# Patient Record
Sex: FEMALE | Race: BLACK OR AFRICAN AMERICAN | NOT HISPANIC OR LATINO | Employment: FULL TIME | URBAN - METROPOLITAN AREA
[De-identification: names, ages, dates, MRNs, and addresses within clinical notes are randomized per-mention and may not be internally consistent; named-entity substitution may affect disease eponyms.]

---

## 2017-02-06 RX ORDER — ZOLPIDEM TARTRATE 10 MG/1
TABLET ORAL
Qty: 90 TABLET | Refills: 0 | Status: SHIPPED | OUTPATIENT
Start: 2017-02-06 | End: 2017-07-10 | Stop reason: SDUPTHER

## 2017-03-03 ENCOUNTER — TELEPHONE (OUTPATIENT)
Dept: INTERNAL MEDICINE | Facility: CLINIC | Age: 57
End: 2017-03-03

## 2017-03-03 NOTE — TELEPHONE ENCOUNTER
----- Message from Aleta Parra sent at 3/3/2017  8:22 AM EST -----  THE PATIENT WOULD LIKE TO GET A TB SCAN TEST WHEN SHE COMES TO HER APPOINTMENT NEXT WEEK. IF THERE IS ANY QUESTIONS THE PT'S CALL BACK NUMBER [phone]

## 2017-03-13 ENCOUNTER — OFFICE VISIT (OUTPATIENT)
Dept: INTERNAL MEDICINE | Facility: CLINIC | Age: 57
End: 2017-03-13

## 2017-03-13 VITALS
HEART RATE: 66 BPM | WEIGHT: 198 LBS | BODY MASS INDEX: 35.08 KG/M2 | DIASTOLIC BLOOD PRESSURE: 80 MMHG | OXYGEN SATURATION: 98 % | HEIGHT: 63 IN | SYSTOLIC BLOOD PRESSURE: 130 MMHG

## 2017-03-13 DIAGNOSIS — R29.898: Primary | ICD-10-CM

## 2017-03-13 PROCEDURE — 99213 OFFICE O/P EST LOW 20 MIN: CPT | Performed by: NURSE PRACTITIONER

## 2017-03-13 NOTE — PROGRESS NOTES
Subjective  Numbness (both ankles, ongoijg for a couple of months, legs feel cold to touch )      Diane aMrk is a 56 y.o. female.   No Known Allergies  History of Present Illness      Ankles appear darker than legs, ankles and feet cold but ankles feel slightly numb , has been about 6 mos , no changes , wearing high heels today but states this is not always the case  .    Review of Systems   Skin: Positive for color change.   Neurological: Positive for numbness.   All other systems reviewed and are negative.      Objective   Physical Exam   Constitutional: She is oriented to person, place, and time. She appears well-developed.   HENT:   Head: Normocephalic.   Eyes: Conjunctivae are normal.   Cardiovascular: Normal rate and regular rhythm.    Pulmonary/Chest: Effort normal and breath sounds normal.   Neurological: She is alert and oriented to person, place, and time. She has normal strength.   Skin: Skin is warm and dry.   I do not see a color change   Psychiatric: She has a normal mood and affect. Her behavior is normal. Judgment and thought content normal.       Assessment/Plan     Problem List Items Addressed This Visit     None      Visit Diagnoses     Ankle symptom    -  Primary    Relevant Orders    Duplex Venous Upper Extremity - Bilateral CAR        Will call pt with results and poc

## 2017-03-17 ENCOUNTER — TELEPHONE (OUTPATIENT)
Dept: INTERNAL MEDICINE | Facility: CLINIC | Age: 57
End: 2017-03-17

## 2017-03-17 NOTE — TELEPHONE ENCOUNTER
----- Message from Alize Holland sent at 3/17/2017  8:58 AM EDT -----  Contact: PATIENT   RE: UPCOMING DOPPLER    PATIENT STATES THAT THIS PROCEDURE IS GOING TO COST $1200 OUT OF POCKET. SHE STATES THIS IS MORE THAN SHE CAN AFFORD AND WOULD LIKE TO KNOW IF THERE IS SOMETHING ELSE THAT CAN BE DONE. SHE THINKS SHE HAD THIS DONE LAST YEAR AT [facility].    CALL BACK #[phone]

## 2017-03-17 NOTE — TELEPHONE ENCOUNTER
Spoke with pt and informed her that Taylor was out of the office and asked if this was something she could wait to discuss when Taylor was back or if this needed to be addressed before the weekend. Pt stated her appt is scheduled for Monday so she would like to hear back as soon as possible. I informed her I would speak with Dr. Escalante and contact her back with recommendations. Pt verbalized understanding and had no further questions.

## 2017-03-17 NOTE — TELEPHONE ENCOUNTER
I spoke with Dr. Escalante who stated that the reason her payment amount is so high is due to her insurance deductible. Dr. Escalante recommended she call her insurance and see if they recommended her having the procedure done at another facility and it be cheaper for her. I called and informed pt of this. Pt verbalized understanding and had no further questions.

## 2017-03-20 ENCOUNTER — APPOINTMENT (OUTPATIENT)
Dept: CARDIOLOGY | Facility: HOSPITAL | Age: 57
End: 2017-03-20

## 2017-03-30 RX ORDER — LOSARTAN POTASSIUM AND HYDROCHLOROTHIAZIDE 25; 100 MG/1; MG/1
TABLET ORAL
Qty: 30 TABLET | Refills: 5 | Status: SHIPPED | OUTPATIENT
Start: 2017-03-30 | End: 2018-01-10 | Stop reason: CLARIF

## 2017-04-18 ENCOUNTER — OFFICE VISIT (OUTPATIENT)
Dept: INTERNAL MEDICINE | Facility: CLINIC | Age: 57
End: 2017-04-18

## 2017-04-18 VITALS
HEART RATE: 64 BPM | WEIGHT: 198 LBS | SYSTOLIC BLOOD PRESSURE: 128 MMHG | DIASTOLIC BLOOD PRESSURE: 80 MMHG | BODY MASS INDEX: 35.08 KG/M2 | OXYGEN SATURATION: 99 % | HEIGHT: 63 IN

## 2017-04-18 DIAGNOSIS — I10 ESSENTIAL HYPERTENSION: Primary | ICD-10-CM

## 2017-04-18 PROCEDURE — 99213 OFFICE O/P EST LOW 20 MIN: CPT | Performed by: NURSE PRACTITIONER

## 2017-04-18 NOTE — PROGRESS NOTES
"Subjective  Follow-up (hypertension, needs TB skin test for work )      Diane Mark is a 56 y.o. female.   No Known Allergies  Hypertension   This is a chronic problem. The problem is controlled. Pertinent negatives include no chest pain, palpitations, peripheral edema, PND or shortness of breath. There are no associated agents to hypertension. Risk factors for coronary artery disease include post-menopausal state. Past treatments include angiotensin blockers. The current treatment provides significant improvement. There are no compliance problems.  There is no history of PVD, renovascular disease or retinopathy. There is no history of sleep apnea.         Needs tb skin test for work   The following portions of the patient's history were reviewed and updated as appropriate: allergies, current medications, past family history, past medical history, past social history, past surgical history and problem list.    Review of Systems   Constitutional: Negative for fatigue.   Respiratory: Negative for apnea, chest tightness and shortness of breath.    Cardiovascular: Negative for chest pain, palpitations, leg swelling and PND.   Skin: Negative for color change.   Psychiatric/Behavioral: The patient is not nervous/anxious.    All other systems reviewed and are negative.      Objective   Physical Exam   Constitutional: She is oriented to person, place, and time. She appears well-developed and well-nourished.   HENT:   Head: Normocephalic and atraumatic.   Eyes: Conjunctivae are normal.   Pulmonary/Chest: Effort normal.   Neurological: She is alert and oriented to person, place, and time.   Skin: Skin is warm and dry.   Psychiatric: She has a normal mood and affect. Her behavior is normal. Judgment and thought content normal.   Nursing note and vitals reviewed.    /80  Pulse 64  Ht 63\" (160 cm)  Wt 198 lb (89.8 kg)  SpO2 99%  BMI 35.07 kg/m2    Assessment/Plan     Problem List Items Addressed This Visit        " Cardiovascular and Mediastinum    Hypertension - Primary              Blood pressure is controlled with medication.

## 2017-04-21 ENCOUNTER — LAB (OUTPATIENT)
Dept: INTERNAL MEDICINE | Facility: CLINIC | Age: 57
End: 2017-04-21

## 2017-04-21 DIAGNOSIS — Z00.00 HEALTH CARE MAINTENANCE: Primary | ICD-10-CM

## 2017-04-21 LAB
INDURATION: 0 MM (ref 0–10)
TB SKIN TEST: NEGATIVE

## 2017-04-21 PROCEDURE — 86580 TB INTRADERMAL TEST: CPT | Performed by: NURSE PRACTITIONER

## 2017-04-27 ENCOUNTER — OFFICE VISIT (OUTPATIENT)
Dept: INTERNAL MEDICINE | Facility: CLINIC | Age: 57
End: 2017-04-27

## 2017-04-27 VITALS
BODY MASS INDEX: 35.33 KG/M2 | HEIGHT: 63 IN | OXYGEN SATURATION: 98 % | HEART RATE: 70 BPM | SYSTOLIC BLOOD PRESSURE: 134 MMHG | WEIGHT: 199.4 LBS | DIASTOLIC BLOOD PRESSURE: 82 MMHG

## 2017-04-27 DIAGNOSIS — N89.8 VAGINAL DISCHARGE: Primary | ICD-10-CM

## 2017-04-27 PROCEDURE — 87591 N.GONORRHOEAE DNA AMP PROB: CPT | Performed by: NURSE PRACTITIONER

## 2017-04-27 PROCEDURE — 99213 OFFICE O/P EST LOW 20 MIN: CPT | Performed by: NURSE PRACTITIONER

## 2017-04-27 PROCEDURE — 87491 CHLMYD TRACH DNA AMP PROBE: CPT | Performed by: NURSE PRACTITIONER

## 2017-04-27 PROCEDURE — 87798 DETECT AGENT NOS DNA AMP: CPT | Performed by: NURSE PRACTITIONER

## 2017-04-27 PROCEDURE — 87481 CANDIDA DNA AMP PROBE: CPT | Performed by: NURSE PRACTITIONER

## 2017-04-27 PROCEDURE — 87661 TRICHOMONAS VAGINALIS AMPLIF: CPT | Performed by: NURSE PRACTITIONER

## 2017-04-27 NOTE — PROGRESS NOTES
"Subjective  Vaginitis      Diane Mark is a 56 y.o. female.   No Known Allergies  History of Present Illness      Yesterday noted a discharge from vagina, had a bad smell to it , was frothy , a little itchy , has not used any otc meds   The following portions of the patient's history were reviewed and updated as appropriate: allergies, current medications, past family history, past medical history, past social history, past surgical history and problem list.    Review of Systems   Genitourinary: Positive for vaginal discharge.        Vaginal itching   All other systems reviewed and are negative.      Objective   Physical Exam   Constitutional: She is oriented to person, place, and time. She appears well-developed and well-nourished.   HENT:   Head: Normocephalic and atraumatic.   Pulmonary/Chest: Effort normal.   Genitourinary: Vaginal discharge found.   Genitourinary Comments: Thick white discharge   Neurological: She is alert and oriented to person, place, and time.   Skin: Skin is warm and dry.   Psychiatric: She has a normal mood and affect. Her behavior is normal. Judgment and thought content normal.   Nursing note and vitals reviewed.    /82  Pulse 70  Ht 63\" (160 cm)  Wt 199 lb 6.4 oz (90.4 kg)  SpO2 98%  BMI 35.32 kg/m2    Assessment/Plan     Problem List Items Addressed This Visit     None      Visit Diagnoses     Vaginal discharge    -  Primary    Relevant Orders    Luis FLOYD+          I will call her with results     "

## 2017-05-02 LAB
A VAGINAE DNA VAG QL NAA+PROBE: ABNORMAL SCORE
BVAB2 DNA VAG QL NAA+PROBE: ABNORMAL SCORE
C ALBICANS DNA VAG QL NAA+PROBE: NEGATIVE
C GLABRATA DNA VAG QL NAA+PROBE: POSITIVE
C TRACH RRNA SPEC DONR QL NAA+PROBE: NEGATIVE
MEGASPHAERA 1: ABNORMAL SCORE
N GONORRHOEA DNA SPEC QL NAA+PROBE: NEGATIVE
T VAGINALIS RRNA GENITAL QL PROBE: NEGATIVE

## 2017-05-02 RX ORDER — PHENTERMINE HYDROCHLORIDE 37.5 MG/1
CAPSULE ORAL
Qty: 30 CAPSULE | Refills: 0 | OUTPATIENT
Start: 2017-05-02

## 2017-05-03 RX ORDER — FLUCONAZOLE 150 MG/1
150 TABLET ORAL ONCE
Qty: 1 TABLET | Refills: 0 | Status: SHIPPED | OUTPATIENT
Start: 2017-05-03 | End: 2017-05-03

## 2017-05-05 ENCOUNTER — OFFICE VISIT (OUTPATIENT)
Dept: INTERNAL MEDICINE | Facility: CLINIC | Age: 57
End: 2017-05-05

## 2017-05-05 VITALS
DIASTOLIC BLOOD PRESSURE: 72 MMHG | WEIGHT: 199 LBS | HEART RATE: 67 BPM | OXYGEN SATURATION: 99 % | SYSTOLIC BLOOD PRESSURE: 124 MMHG | HEIGHT: 63 IN | BODY MASS INDEX: 35.26 KG/M2

## 2017-05-05 DIAGNOSIS — Z79.899 HIGH RISK MEDICATION USE: ICD-10-CM

## 2017-05-05 DIAGNOSIS — E66.01 MORBID OBESITY DUE TO EXCESS CALORIES (HCC): ICD-10-CM

## 2017-05-05 DIAGNOSIS — E66.09 NON MORBID OBESITY DUE TO EXCESS CALORIES: ICD-10-CM

## 2017-05-05 DIAGNOSIS — E66.3 OVERWEIGHT (BMI 25.0-29.9): Primary | ICD-10-CM

## 2017-05-05 PROCEDURE — 99213 OFFICE O/P EST LOW 20 MIN: CPT | Performed by: HOSPITALIST

## 2017-05-05 RX ORDER — PHENTERMINE HYDROCHLORIDE 37.5 MG/1
37.5 CAPSULE ORAL EVERY MORNING
Qty: 30 CAPSULE | Refills: 3 | Status: SHIPPED | OUTPATIENT
Start: 2017-05-05 | End: 2017-05-09 | Stop reason: SDUPTHER

## 2017-05-09 ENCOUNTER — TELEPHONE (OUTPATIENT)
Dept: INTERNAL MEDICINE | Facility: CLINIC | Age: 57
End: 2017-05-09

## 2017-05-09 DIAGNOSIS — E66.09 NON MORBID OBESITY DUE TO EXCESS CALORIES: ICD-10-CM

## 2017-05-09 RX ORDER — PHENTERMINE HYDROCHLORIDE 37.5 MG/1
37.5 CAPSULE ORAL EVERY MORNING
Qty: 30 CAPSULE | Refills: 3 | Status: SHIPPED | OUTPATIENT
Start: 2017-05-09 | End: 2017-07-28 | Stop reason: DRUGHIGH

## 2017-05-11 ENCOUNTER — OFFICE VISIT (OUTPATIENT)
Dept: INTERNAL MEDICINE | Facility: CLINIC | Age: 57
End: 2017-05-11

## 2017-05-11 VITALS
SYSTOLIC BLOOD PRESSURE: 134 MMHG | HEART RATE: 69 BPM | WEIGHT: 197 LBS | TEMPERATURE: 98.5 F | BODY MASS INDEX: 34.91 KG/M2 | OXYGEN SATURATION: 98 % | HEIGHT: 63 IN | DIASTOLIC BLOOD PRESSURE: 84 MMHG

## 2017-05-11 DIAGNOSIS — J20.9 ACUTE BRONCHITIS, UNSPECIFIED ORGANISM: Primary | ICD-10-CM

## 2017-05-11 PROCEDURE — 94640 AIRWAY INHALATION TREATMENT: CPT | Performed by: NURSE PRACTITIONER

## 2017-05-11 PROCEDURE — 99213 OFFICE O/P EST LOW 20 MIN: CPT | Performed by: NURSE PRACTITIONER

## 2017-05-11 PROCEDURE — 96372 THER/PROPH/DIAG INJ SC/IM: CPT | Performed by: NURSE PRACTITIONER

## 2017-05-11 RX ORDER — IPRATROPIUM BROMIDE AND ALBUTEROL SULFATE 2.5; .5 MG/3ML; MG/3ML
3 SOLUTION RESPIRATORY (INHALATION) ONCE
Status: COMPLETED | OUTPATIENT
Start: 2017-05-11 | End: 2017-05-11

## 2017-05-11 RX ORDER — BENZONATATE 100 MG/1
100 CAPSULE ORAL 3 TIMES DAILY PRN
Qty: 30 CAPSULE | Refills: 0 | Status: SHIPPED | OUTPATIENT
Start: 2017-05-11 | End: 2017-07-28

## 2017-05-11 RX ORDER — CEFDINIR 300 MG/1
300 CAPSULE ORAL 2 TIMES DAILY
Qty: 20 CAPSULE | Refills: 0 | Status: SHIPPED | OUTPATIENT
Start: 2017-05-11 | End: 2017-07-28

## 2017-05-11 RX ORDER — ALBUTEROL SULFATE 90 UG/1
2 AEROSOL, METERED RESPIRATORY (INHALATION) EVERY 4 HOURS PRN
Qty: 1 INHALER | Refills: 1 | Status: SHIPPED | OUTPATIENT
Start: 2017-05-11 | End: 2019-10-25 | Stop reason: SDUPTHER

## 2017-05-11 RX ORDER — METHYLPREDNISOLONE ACETATE 40 MG/ML
40 INJECTION, SUSPENSION INTRA-ARTICULAR; INTRALESIONAL; INTRAMUSCULAR; SOFT TISSUE ONCE
Status: COMPLETED | OUTPATIENT
Start: 2017-05-11 | End: 2017-05-11

## 2017-05-11 RX ADMIN — IPRATROPIUM BROMIDE AND ALBUTEROL SULFATE 3 ML: 2.5; .5 SOLUTION RESPIRATORY (INHALATION) at 14:43

## 2017-05-11 RX ADMIN — METHYLPREDNISOLONE ACETATE 40 MG: 40 INJECTION, SUSPENSION INTRA-ARTICULAR; INTRALESIONAL; INTRAMUSCULAR; SOFT TISSUE at 14:41

## 2017-05-12 DIAGNOSIS — R05.9 COUGH: Primary | ICD-10-CM

## 2017-07-10 RX ORDER — ZOLPIDEM TARTRATE 10 MG/1
TABLET ORAL
Qty: 90 TABLET | Refills: 0 | OUTPATIENT
Start: 2017-07-10

## 2017-07-11 RX ORDER — ZOLPIDEM TARTRATE 10 MG/1
TABLET ORAL
Qty: 30 TABLET | Refills: 0 | Status: SHIPPED | OUTPATIENT
Start: 2017-07-11 | End: 2017-08-10 | Stop reason: SDUPTHER

## 2017-07-11 NOTE — TELEPHONE ENCOUNTER
Called and informed pt of Rx being ready for . Informed pt that we can no longer call controlled medication into the pharmacy, that they must be picked up and signed for in the office. Pt verbalized understanding and had no further questions.

## 2017-07-19 DIAGNOSIS — E66.09 NON MORBID OBESITY DUE TO EXCESS CALORIES: ICD-10-CM

## 2017-07-19 RX ORDER — PHENTERMINE HYDROCHLORIDE 37.5 MG/1
37.5 CAPSULE ORAL EVERY MORNING
Qty: 30 CAPSULE | Refills: 3 | OUTPATIENT
Start: 2017-07-19

## 2017-07-28 ENCOUNTER — OFFICE VISIT (OUTPATIENT)
Dept: INTERNAL MEDICINE | Facility: CLINIC | Age: 57
End: 2017-07-28

## 2017-07-28 VITALS
BODY MASS INDEX: 35.07 KG/M2 | SYSTOLIC BLOOD PRESSURE: 122 MMHG | HEART RATE: 85 BPM | DIASTOLIC BLOOD PRESSURE: 70 MMHG | OXYGEN SATURATION: 100 % | WEIGHT: 198 LBS

## 2017-07-28 DIAGNOSIS — R20.0 NUMBNESS IN BOTH HANDS: ICD-10-CM

## 2017-07-28 DIAGNOSIS — G62.9 POLYNEUROPATHY: ICD-10-CM

## 2017-07-28 DIAGNOSIS — M79.605 BILATERAL LEG PAIN: Primary | ICD-10-CM

## 2017-07-28 DIAGNOSIS — R20.0 NUMBNESS IN BOTH LEGS: ICD-10-CM

## 2017-07-28 DIAGNOSIS — M79.604 BILATERAL LEG PAIN: Primary | ICD-10-CM

## 2017-07-28 DIAGNOSIS — E66.09 OBESITY DUE TO EXCESS CALORIES, UNSPECIFIED OBESITY SEVERITY: ICD-10-CM

## 2017-07-28 PROCEDURE — 99213 OFFICE O/P EST LOW 20 MIN: CPT | Performed by: NURSE PRACTITIONER

## 2017-07-28 RX ORDER — PHENTERMINE HYDROCHLORIDE 37.5 MG/1
37.5 CAPSULE ORAL EVERY MORNING
Qty: 30 CAPSULE | Refills: 0 | Status: SHIPPED | OUTPATIENT
Start: 2017-07-28 | End: 2018-01-10

## 2017-07-28 RX ORDER — GABAPENTIN 300 MG/1
300 CAPSULE ORAL 3 TIMES DAILY
Qty: 30 CAPSULE | Refills: 3 | Status: SHIPPED | OUTPATIENT
Start: 2017-07-28 | End: 2018-01-10

## 2017-07-28 NOTE — PROGRESS NOTES
Subjective  Numbness (and tingling in hands and legs )      Diane Mark is a 56 y.o. female.   No Known Allergies  History of Present Illness      Feet and legs, cold ache, numbness/tingle. Does well at night nut when sitting at desk has to constantly get up and walk around, intermittent x 6 mos , hands in last week just started having intermittent numbness  The following portions of the patient's history were reviewed and updated as appropriate: allergies, current medications, past family history, past medical history, past social history, past surgical history and problem list.    Review of Systems   Musculoskeletal: Positive for myalgias.   Neurological: Positive for numbness.   All other systems reviewed and are negative.      Objective   Physical Exam   Constitutional: She is oriented to person, place, and time. She appears well-developed and well-nourished.   HENT:   Head: Normocephalic and atraumatic.   Eyes: Conjunctivae are normal.   Cardiovascular: Normal rate.    Pulmonary/Chest: Effort normal.   Musculoskeletal: Normal range of motion. She exhibits no tenderness.   Neurological: She is alert and oriented to person, place, and time. She has normal reflexes.   Skin: Skin is warm and dry.   Psychiatric: She has a normal mood and affect. Her behavior is normal. Judgment and thought content normal.   Nursing note and vitals reviewed.      Assessment/Plan     Problem List Items Addressed This Visit     None      Visit Diagnoses     Bilateral leg pain    -  Primary    Relevant Orders    Nerve Conduction Test 3-4    Numbness in both hands        Relevant Orders    Nerve Conduction Test 3-4    Numbness in both legs        Relevant Orders    Duplex Venous Upper Extremity - Bilateral CAR    Nerve Conduction Test 3-4    Obesity due to excess calories, unspecified obesity severity        Relevant Medications    phentermine 37.5 MG capsule    Polyneuropathy        Relevant Medications    gabapentin (NEURONTIN)  300 MG capsule        Has appt in 8/17 fpr cpe, keep this appt

## 2017-08-01 ENCOUNTER — TELEPHONE (OUTPATIENT)
Dept: INTERNAL MEDICINE | Facility: CLINIC | Age: 57
End: 2017-08-01

## 2017-08-03 DIAGNOSIS — M79.605 PAIN IN BOTH LOWER EXTREMITIES: Primary | ICD-10-CM

## 2017-08-03 DIAGNOSIS — R20.0 BILATERAL LEG NUMBNESS: ICD-10-CM

## 2017-08-03 DIAGNOSIS — R20.0 BILATERAL HAND NUMBNESS: Primary | ICD-10-CM

## 2017-08-03 DIAGNOSIS — M79.604 PAIN IN BOTH LOWER EXTREMITIES: Primary | ICD-10-CM

## 2017-08-10 ENCOUNTER — HOSPITAL ENCOUNTER (OUTPATIENT)
Dept: CARDIOLOGY | Facility: HOSPITAL | Age: 57
Discharge: HOME | End: 2017-08-10
Admitting: NURSE PRACTITIONER

## 2017-08-10 ENCOUNTER — APPOINTMENT (OUTPATIENT)
Dept: NEUROLOGY | Facility: HOSPITAL | Age: 57
End: 2017-08-10

## 2017-08-10 DIAGNOSIS — R20.0 NUMBNESS IN BOTH LEGS: ICD-10-CM

## 2017-08-10 LAB
BH CV ECHO MEAS - BSA(HAYCOCK): 2 M^2
BH CV ECHO MEAS - BSA: 1.9 M^2
BH CV ECHO MEAS - BZI_BMI: 33.5 KILOGRAMS/M^2
BH CV ECHO MEAS - BZI_METRIC_HEIGHT: 160 CM
BH CV ECHO MEAS - BZI_METRIC_WEIGHT: 85.7 KG
BH CV LOWER VASCULAR LEFT COMMON FEMORAL AUGMENT: NORMAL
BH CV LOWER VASCULAR LEFT COMMON FEMORAL COMPRESS: NORMAL
BH CV LOWER VASCULAR LEFT COMMON FEMORAL PHASIC: NORMAL
BH CV LOWER VASCULAR LEFT COMMON FEMORAL SPONT: NORMAL
BH CV LOWER VASCULAR LEFT DISTAL FEMORAL COMPRESS: NORMAL
BH CV LOWER VASCULAR LEFT GASTRONEMIUS COMPRESS: NORMAL
BH CV LOWER VASCULAR LEFT GREATER SAPH AK COMPRESS: NORMAL
BH CV LOWER VASCULAR LEFT GREATER SAPH BK COMPRESS: NORMAL
BH CV LOWER VASCULAR LEFT LESSER SAPH COMPRESS: NORMAL
BH CV LOWER VASCULAR LEFT MID FEMORAL AUGMENT: NORMAL
BH CV LOWER VASCULAR LEFT MID FEMORAL COMPRESS: NORMAL
BH CV LOWER VASCULAR LEFT MID FEMORAL PHASIC: NORMAL
BH CV LOWER VASCULAR LEFT MID FEMORAL SPONT: NORMAL
BH CV LOWER VASCULAR LEFT PERONEAL COMPRESS: NORMAL
BH CV LOWER VASCULAR LEFT POPLITEAL AUGMENT: NORMAL
BH CV LOWER VASCULAR LEFT POPLITEAL COMPRESS: NORMAL
BH CV LOWER VASCULAR LEFT POPLITEAL PHASIC: NORMAL
BH CV LOWER VASCULAR LEFT POPLITEAL SPONT: NORMAL
BH CV LOWER VASCULAR LEFT POSTERIOR TIBIAL COMPRESS: NORMAL
BH CV LOWER VASCULAR LEFT PROFUNDA FEMORAL AUGMENT: NORMAL
BH CV LOWER VASCULAR LEFT PROFUNDA FEMORAL COMPRESS: NORMAL
BH CV LOWER VASCULAR LEFT PROFUNDA FEMORAL PHASIC: NORMAL
BH CV LOWER VASCULAR LEFT PROFUNDA FEMORAL SPONT: NORMAL
BH CV LOWER VASCULAR LEFT PROXIMAL FEMORAL COMPRESS: NORMAL
BH CV LOWER VASCULAR LEFT SAPHENOFEMORAL JUNCTION AUGMENT: NORMAL
BH CV LOWER VASCULAR LEFT SAPHENOFEMORAL JUNCTION COMPRESS: NORMAL
BH CV LOWER VASCULAR LEFT SAPHENOFEMORAL JUNCTION PHASIC: NORMAL
BH CV LOWER VASCULAR LEFT SAPHENOFEMORAL JUNCTION SPONT: NORMAL
BH CV LOWER VASCULAR RIGHT COMMON FEMORAL AUGMENT: NORMAL
BH CV LOWER VASCULAR RIGHT COMMON FEMORAL COMPRESS: NORMAL
BH CV LOWER VASCULAR RIGHT COMMON FEMORAL PHASIC: NORMAL
BH CV LOWER VASCULAR RIGHT COMMON FEMORAL SPONT: NORMAL
BH CV LOWER VASCULAR RIGHT DISTAL FEMORAL COMPRESS: NORMAL
BH CV LOWER VASCULAR RIGHT GASTRONEMIUS COMPRESS: NORMAL
BH CV LOWER VASCULAR RIGHT GREATER SAPH AK COMPRESS: NORMAL
BH CV LOWER VASCULAR RIGHT GREATER SAPH BK COMPRESS: NORMAL
BH CV LOWER VASCULAR RIGHT LESSER SAPH COMPRESS: NORMAL
BH CV LOWER VASCULAR RIGHT MID FEMORAL AUGMENT: NORMAL
BH CV LOWER VASCULAR RIGHT MID FEMORAL COMPRESS: NORMAL
BH CV LOWER VASCULAR RIGHT MID FEMORAL PHASIC: NORMAL
BH CV LOWER VASCULAR RIGHT MID FEMORAL SPONT: NORMAL
BH CV LOWER VASCULAR RIGHT PERONEAL COMPRESS: NORMAL
BH CV LOWER VASCULAR RIGHT POPLITEAL AUGMENT: NORMAL
BH CV LOWER VASCULAR RIGHT POPLITEAL COMPRESS: NORMAL
BH CV LOWER VASCULAR RIGHT POPLITEAL PHASIC: NORMAL
BH CV LOWER VASCULAR RIGHT POPLITEAL SPONT: NORMAL
BH CV LOWER VASCULAR RIGHT POSTERIOR TIBIAL COMPRESS: NORMAL
BH CV LOWER VASCULAR RIGHT PROFUNDA FEMORAL COMPRESS: NORMAL
BH CV LOWER VASCULAR RIGHT PROXIMAL FEMORAL COMPRESS: NORMAL
BH CV LOWER VASCULAR RIGHT SAPHENOFEMORAL JUNCTION AUGMENT: NORMAL
BH CV LOWER VASCULAR RIGHT SAPHENOFEMORAL JUNCTION COMPRESS: NORMAL
BH CV LOWER VASCULAR RIGHT SAPHENOFEMORAL JUNCTION PHASIC: NORMAL
BH CV LOWER VASCULAR RIGHT SAPHENOFEMORAL JUNCTION SPONT: NORMAL

## 2017-08-10 PROCEDURE — 93970 EXTREMITY STUDY: CPT | Performed by: INTERNAL MEDICINE

## 2017-08-10 PROCEDURE — 93970 EXTREMITY STUDY: CPT

## 2017-08-11 RX ORDER — ZOLPIDEM TARTRATE 10 MG/1
TABLET ORAL
Qty: 30 TABLET | Refills: 0 | Status: SHIPPED | OUTPATIENT
Start: 2017-08-11 | End: 2017-08-12 | Stop reason: SDUPTHER

## 2017-08-14 RX ORDER — ZOLPIDEM TARTRATE 10 MG/1
TABLET ORAL
Qty: 30 TABLET | Refills: 0 | Status: SHIPPED | OUTPATIENT
Start: 2017-08-14 | End: 2017-11-05 | Stop reason: SDUPTHER

## 2017-08-21 ENCOUNTER — OFFICE VISIT (OUTPATIENT)
Dept: INTERNAL MEDICINE | Facility: CLINIC | Age: 57
End: 2017-08-21

## 2017-08-21 VITALS
SYSTOLIC BLOOD PRESSURE: 128 MMHG | HEART RATE: 63 BPM | BODY MASS INDEX: 34.94 KG/M2 | DIASTOLIC BLOOD PRESSURE: 84 MMHG | WEIGHT: 197.2 LBS | OXYGEN SATURATION: 98 % | TEMPERATURE: 98.1 F | HEIGHT: 63 IN

## 2017-08-21 DIAGNOSIS — Z00.00 ROUTINE ADULT HEALTH MAINTENANCE: Primary | ICD-10-CM

## 2017-08-21 LAB
ALBUMIN SERPL-MCNC: 4.3 G/DL (ref 3.2–4.8)
ALBUMIN/GLOB SERPL: 1.5 G/DL (ref 1.5–2.5)
ALP SERPL-CCNC: 98 U/L (ref 25–100)
ALT SERPL W P-5'-P-CCNC: 32 U/L (ref 7–40)
ANION GAP SERPL CALCULATED.3IONS-SCNC: 8 MMOL/L (ref 3–11)
ARTICHOKE IGE QN: 106 MG/DL (ref 0–130)
AST SERPL-CCNC: 27 U/L (ref 0–33)
BILIRUB SERPL-MCNC: 0.4 MG/DL (ref 0.3–1.2)
BUN BLD-MCNC: 12 MG/DL (ref 9–23)
BUN/CREAT SERPL: 15 (ref 7–25)
CALCIUM SPEC-SCNC: 10 MG/DL (ref 8.7–10.4)
CHLORIDE SERPL-SCNC: 100 MMOL/L (ref 99–109)
CHOLEST SERPL-MCNC: 186 MG/DL (ref 0–200)
CO2 SERPL-SCNC: 32 MMOL/L (ref 20–31)
CREAT BLD-MCNC: 0.8 MG/DL (ref 0.6–1.3)
DEPRECATED RDW RBC AUTO: 40.9 FL (ref 37–54)
ERYTHROCYTE [DISTWIDTH] IN BLOOD BY AUTOMATED COUNT: 12 % (ref 11.3–14.5)
GFR SERPL CREATININE-BSD FRML MDRD: 89 ML/MIN/1.73
GLOBULIN UR ELPH-MCNC: 2.8 GM/DL
GLUCOSE BLD-MCNC: 86 MG/DL (ref 70–100)
HCT VFR BLD AUTO: 40.7 % (ref 34.5–44)
HDLC SERPL-MCNC: 49 MG/DL (ref 40–60)
HGB BLD-MCNC: 13.2 G/DL (ref 11.5–15.5)
MCH RBC QN AUTO: 30.1 PG (ref 27–31)
MCHC RBC AUTO-ENTMCNC: 32.4 G/DL (ref 32–36)
MCV RBC AUTO: 92.7 FL (ref 80–99)
PLATELET # BLD AUTO: 254 10*3/MM3 (ref 150–450)
PMV BLD AUTO: 11.1 FL (ref 6–12)
POTASSIUM BLD-SCNC: 3.8 MMOL/L (ref 3.5–5.5)
PROT SERPL-MCNC: 7.1 G/DL (ref 5.7–8.2)
RBC # BLD AUTO: 4.39 10*6/MM3 (ref 3.89–5.14)
SODIUM BLD-SCNC: 140 MMOL/L (ref 132–146)
TRIGL SERPL-MCNC: 143 MG/DL (ref 0–150)
TSH SERPL DL<=0.05 MIU/L-ACNC: 1.28 MIU/ML (ref 0.35–5.35)
WBC NRBC COR # BLD: 2.91 10*3/MM3 (ref 3.5–10.8)

## 2017-08-21 PROCEDURE — 80061 LIPID PANEL: CPT | Performed by: NURSE PRACTITIONER

## 2017-08-21 PROCEDURE — 80053 COMPREHEN METABOLIC PANEL: CPT | Performed by: NURSE PRACTITIONER

## 2017-08-21 PROCEDURE — 84443 ASSAY THYROID STIM HORMONE: CPT | Performed by: NURSE PRACTITIONER

## 2017-08-21 PROCEDURE — 85027 COMPLETE CBC AUTOMATED: CPT | Performed by: NURSE PRACTITIONER

## 2017-08-21 PROCEDURE — 99396 PREV VISIT EST AGE 40-64: CPT | Performed by: NURSE PRACTITIONER

## 2017-08-21 NOTE — PROGRESS NOTES
Subjective  Annual Exam (Annual physical; patient has no complaints)      Diane Mark is a 56 y.o. female.   No Known Allergies  History of Present Illness      Here for cpe and cbe today, no complaints  The following portions of the patient's history were reviewed and updated as appropriate: allergies, current medications, past family history, past medical history, past social history, past surgical history and problem list.    Review of Systems   Constitutional: Negative for appetite change, fever and unexpected weight change.   HENT: Negative for ear pain, facial swelling and sore throat.    Eyes: Negative for pain and visual disturbance.   Respiratory: Negative for chest tightness, shortness of breath and wheezing.    Cardiovascular: Negative for chest pain and palpitations.   Gastrointestinal: Negative for abdominal pain and blood in stool.   Endocrine: Negative.    Genitourinary: Negative for difficulty urinating and hematuria.   Musculoskeletal: Negative for joint swelling.   Neurological: Negative for tremors, seizures and syncope.   Hematological: Negative for adenopathy.   Psychiatric/Behavioral: Negative.    All other systems reviewed and are negative.      Objective   Physical Exam   Constitutional: She is oriented to person, place, and time. She appears well-developed and well-nourished. No distress.   HENT:   Head: Normocephalic and atraumatic. Hair is normal.   Right Ear: Hearing, tympanic membrane, external ear and ear canal normal. No drainage. No decreased hearing is noted.   Left Ear: Hearing, tympanic membrane, external ear and ear canal normal. No decreased hearing is noted.   Nose: No nasal deformity.   Mouth/Throat: Oropharynx is clear and moist.   Eyes: Conjunctivae, EOM and lids are normal. Pupils are equal, round, and reactive to light. Lids are everted and swept, no foreign bodies found. Right eye exhibits no discharge. Left eye exhibits no discharge.   Fundoscopic exam:       The  "right eye shows red reflex.        The left eye shows red reflex.   Neck: Normal range of motion. Neck supple. No JVD present. No tracheal deviation present. No thyromegaly present.   Cardiovascular: Normal rate, regular rhythm, normal heart sounds, intact distal pulses and normal pulses.  Exam reveals no gallop and no friction rub.    No murmur heard.  Pulmonary/Chest: Effort normal and breath sounds normal. No respiratory distress. She has no wheezes. She has no rales. Chest wall is not dull to percussion. She exhibits no mass, no tenderness and no bony tenderness. Right breast exhibits no inverted nipple, no mass and no nipple discharge. Left breast exhibits no inverted nipple, no mass and no nipple discharge.   Abdominal: Soft. Bowel sounds are normal. She exhibits no distension and no mass. There is no tenderness. There is no rebound and no guarding. No hernia.   Musculoskeletal: Normal range of motion. She exhibits no edema, tenderness or deformity.   Lymphadenopathy:     She has no cervical adenopathy.        Right: No inguinal adenopathy present.        Left: No inguinal adenopathy present.   Neurological: She is alert and oriented to person, place, and time. She has normal reflexes. She displays normal reflexes. No cranial nerve deficit. She exhibits normal muscle tone. Coordination normal.   Skin: Skin is warm and dry. No rash noted. She is not diaphoretic. No erythema.   Psychiatric: She has a normal mood and affect. Her behavior is normal. Judgment and thought content normal.   Nursing note and vitals reviewed.    /84  Pulse 63  Temp 98.1 °F (36.7 °C)  Ht 63\" (160 cm)  Wt 197 lb 3.2 oz (89.4 kg)  SpO2 98%  BMI 34.93 kg/m2  =  Assessment/Plan     Problem List Items Addressed This Visit     None      Visit Diagnoses     Routine adult health maintenance    -  Primary    Relevant Orders    CBC (No Diff)    Comprehensive Metabolic Panel    TSH    Lipid Panel    "     Seatbelts:-yes  Sunscreenyes  Smoke detectors;yes  Self skin exam;yes  Home secure;yes  Healthy diet;no  Exercise;yes  Smoker;no  Will review labs and send to pt with poc

## 2017-08-23 ENCOUNTER — TELEPHONE (OUTPATIENT)
Dept: INTERNAL MEDICINE | Facility: CLINIC | Age: 57
End: 2017-08-23

## 2017-08-23 NOTE — TELEPHONE ENCOUNTER
Called pt in regards to her medical examination forms that were filled out. Pt did not answer, left voicemail to call back.     Just needing to know if pt needs these faxed somewhere or if she is picking them up.

## 2017-08-28 ENCOUNTER — HOSPITAL ENCOUNTER (OUTPATIENT)
Dept: NEUROLOGY | Facility: HOSPITAL | Age: 57
Discharge: HOME | End: 2017-08-28
Admitting: NURSE PRACTITIONER

## 2017-08-28 ENCOUNTER — TELEPHONE (OUTPATIENT)
Dept: INTERNAL MEDICINE | Facility: CLINIC | Age: 57
End: 2017-08-28

## 2017-08-28 DIAGNOSIS — R20.0 BILATERAL HAND NUMBNESS: ICD-10-CM

## 2017-08-28 DIAGNOSIS — M79.604 BILATERAL LEG PAIN: ICD-10-CM

## 2017-08-28 DIAGNOSIS — M79.605 BILATERAL LEG PAIN: ICD-10-CM

## 2017-08-28 DIAGNOSIS — R20.0 NUMBNESS IN BOTH HANDS: ICD-10-CM

## 2017-08-28 DIAGNOSIS — R20.0 BILATERAL LEG NUMBNESS: ICD-10-CM

## 2017-08-28 DIAGNOSIS — R20.0 NUMBNESS IN BOTH LEGS: ICD-10-CM

## 2017-08-28 PROCEDURE — 95908 NRV CNDJ TST 3-4 STUDIES: CPT

## 2017-08-28 PROCEDURE — 95912 NRV CNDJ TEST 11-12 STUDIES: CPT

## 2017-08-28 NOTE — TELEPHONE ENCOUNTER
MS GOLDSTEIN CALLED TODAY TO FOLLOW UP ON THE STATUS OF A FORM SHE REQUESTED TO HAVE FILLED OUT FOR HER EMPLOYER AFTER HER RECENT PE.

## 2017-08-29 NOTE — TELEPHONE ENCOUNTER
It is scanned in her chart under the media tab. Patient history - Employee Med Exam [facility]  Action Ambler. dated 8/21/17. I was faxed on 8/24/17.     Patient notified.

## 2017-08-31 ENCOUNTER — TELEPHONE (OUTPATIENT)
Dept: INTERNAL MEDICINE | Facility: CLINIC | Age: 57
End: 2017-08-31

## 2017-09-05 DIAGNOSIS — M79.604 BILATERAL LEG PAIN: Primary | ICD-10-CM

## 2017-09-05 DIAGNOSIS — M79.605 BILATERAL LEG PAIN: Primary | ICD-10-CM

## 2017-09-13 ENCOUNTER — OFFICE VISIT (OUTPATIENT)
Dept: ORTHOPEDIC SURGERY | Facility: CLINIC | Age: 57
End: 2017-09-13

## 2017-09-13 VITALS
SYSTOLIC BLOOD PRESSURE: 160 MMHG | HEIGHT: 65 IN | BODY MASS INDEX: 32.15 KG/M2 | WEIGHT: 193 LBS | DIASTOLIC BLOOD PRESSURE: 80 MMHG | HEART RATE: 67 BPM

## 2017-09-13 DIAGNOSIS — M70.62 TROCHANTERIC BURSITIS OF BOTH HIPS: Primary | ICD-10-CM

## 2017-09-13 DIAGNOSIS — R52 PAIN: ICD-10-CM

## 2017-09-13 DIAGNOSIS — R09.89 POOR CIRCULATION OF EXTREMITY: ICD-10-CM

## 2017-09-13 DIAGNOSIS — M70.61 TROCHANTERIC BURSITIS OF BOTH HIPS: Primary | ICD-10-CM

## 2017-09-13 PROCEDURE — 99204 OFFICE O/P NEW MOD 45 MIN: CPT | Performed by: ORTHOPAEDIC SURGERY

## 2017-09-13 PROCEDURE — 20610 DRAIN/INJ JOINT/BURSA W/O US: CPT | Performed by: ORTHOPAEDIC SURGERY

## 2017-09-13 RX ORDER — LIDOCAINE HYDROCHLORIDE 10 MG/ML
4 INJECTION, SOLUTION INFILTRATION; PERINEURAL
Status: COMPLETED | OUTPATIENT
Start: 2017-09-13 | End: 2017-09-13

## 2017-09-13 RX ORDER — BUPIVACAINE HYDROCHLORIDE 2.5 MG/ML
4 INJECTION, SOLUTION INFILTRATION; PERINEURAL
Status: COMPLETED | OUTPATIENT
Start: 2017-09-13 | End: 2017-09-13

## 2017-09-13 RX ORDER — BETAMETHASONE SODIUM PHOSPHATE AND BETAMETHASONE ACETATE 3; 3 MG/ML; MG/ML
6 INJECTION, SUSPENSION INTRA-ARTICULAR; INTRALESIONAL; INTRAMUSCULAR; SOFT TISSUE
Status: COMPLETED | OUTPATIENT
Start: 2017-09-13 | End: 2017-09-13

## 2017-09-13 RX ADMIN — BETAMETHASONE SODIUM PHOSPHATE AND BETAMETHASONE ACETATE 6 MG: 3; 3 INJECTION, SUSPENSION INTRA-ARTICULAR; INTRALESIONAL; INTRAMUSCULAR; SOFT TISSUE at 09:46

## 2017-09-13 RX ADMIN — LIDOCAINE HYDROCHLORIDE 4 ML: 10 INJECTION, SOLUTION INFILTRATION; PERINEURAL at 09:46

## 2017-09-13 RX ADMIN — BUPIVACAINE HYDROCHLORIDE 4 ML: 2.5 INJECTION, SOLUTION INFILTRATION; PERINEURAL at 09:46

## 2017-09-13 NOTE — PROGRESS NOTES
Procedure   Large Joint Arthrocentesis  Date/Time: 9/13/2017 9:46 AM  Consent given by: patient  Site marked: site marked  Timeout: Immediately prior to procedure a time out was called to verify the correct patient, procedure, equipment, support staff and site/side marked as required   Supporting Documentation  Indications: pain   Procedure Details  Location: hip - L greater trochanteric bursa  Needle size: 22 G  Approach: lateral  Medications administered: 4 mL bupivacaine; 4 mL lidocaine 1 %; 6 mg betamethasone acetate-betamethasone sodium phosphate 6 (3-3) MG/ML  Patient tolerance: patient tolerated the procedure well with no immediate complications

## 2017-09-13 NOTE — PROGRESS NOTES
[facility] Note    Subjective     Chief Complaint   Patient presents with   • Hip Pain     Bilateral hip pain for 8 years, Dull, Sharp and achey pain        HPI      Diane Sood is a 56 y.o. female.  She has a history of bilateral hip pain left worse than right.  Worse with walking and sitting all day.  It is better standing at work and resting.  She complains of coolness and discoloration in her left leg she had a venous study and was told it was negative.  She tried tramadol and ibuprofen.  Her pain is 8-9 out of 10 at times.  His aching burning and stabbing and shooting.  She has tried anti-inflammatories and physical therapy she has not yet had an injection.        Past Medical History:   Diagnosis Date   • Arthritis    • Insomnia       Past Surgical History:   Procedure Laterality Date   • HYSTERECTOMY     • SHOULDER SURGERY      Right shoulder   • TUBAL ABDOMINAL LIGATION        Family History   Problem Relation Age of Onset   • Hyperlipidemia Mother    • Osteoarthritis Mother    • Hypertension Mother    • Arthritis Father    • Heart failure Father    • Diabetes Father    • Hypertension Father    • Kidney disease Father    • Stroke Father    • Obesity Other      Social History     Social History   • Marital status:      Spouse name: N/A   • Number of children: N/A   • Years of education: N/A     Occupational History   • Not on file.     Social History Main Topics   • Smoking status: Former Smoker   • Smokeless tobacco: Never Used   • Alcohol use No   • Drug use: No   • Sexual activity: Defer     Other Topics Concern   • Not on file     Social History Narrative      Current Outpatient Prescriptions on File Prior to Visit   Medication Sig Dispense Refill   • folic acid (FOLVITE) 1 MG tablet   4   • losartan-hydrochlorothiazide (HYZAAR) 100-25 MG per tablet TAKE 1 TABLET BY MOUTH DAILY 30 tablet 5   • methotrexate 2.5 MG tablet TAKE 6 TABLETS WEEKLY. (TAKE 3 TABLETS IN  "THE MORNING & 3 TABLETS 12 HRS LATER) REPEAT WEEKLY  4   • zolpidem (AMBIEN) 10 MG tablet TAKE 1 TABLET AT BEDTIME 30 tablet 0   • albuterol (PROAIR HFA) 108 (90 BASE) MCG/ACT inhaler Inhale 2 puffs Every 4 (Four) Hours As Needed for Wheezing. 1 inhaler 1   • gabapentin (NEURONTIN) 300 MG capsule Take 1 capsule by mouth 3 (Three) Times a Day. 30 capsule 3   • linaclotide (LINZESS) 145 MCG capsule capsule Take 1 capsule by mouth Every Morning Before Breakfast. 30 capsule 5   • phentermine 37.5 MG capsule Take 1 capsule by mouth Every Morning. 30 capsule 0   • polyethylene glycol (MIRALAX) packet Take 17 g by mouth Daily. 30 each 3     No current facility-administered medications on file prior to visit.       No Known Allergies     The following portions of the patient's history were reviewed and updated as appropriate: allergies, current medications, past family history, past medical history, past social history, past surgical history and problem list.    Review of Systems   Constitutional: Positive for activity change and fatigue.   HENT: Negative.    Eyes: Positive for pain.   Respiratory: Negative.    Cardiovascular: Positive for palpitations.   Gastrointestinal: Negative.    Endocrine: Negative.    Genitourinary: Negative.    Musculoskeletal: Positive for arthralgias, back pain and neck pain.   Skin: Negative.         sarcadosia   Allergic/Immunologic: Negative.    Neurological: Negative.    Hematological: Bruises/bleeds easily.   Psychiatric/Behavioral: Positive for sleep disturbance.        Objective      Physical Exam  /80  Pulse 67  Ht 65.35\" (166 cm)  Wt 193 lb (87.5 kg)  BMI 31.77 kg/m2    Body mass index is 31.77 kg/(m^2).    General  Mental Status - alert  General Appearance - cooperative, well groomed, not in acute distress  Orientation - Oriented X3  Build & Nutrition - well developed and well nourished  Posture - normal posture  Gait - Normal     Integumentary  Global Assessment  Examination " of related systems reveals - no lymphadenopathy  General Characteristics  Overall examination of the patient's skin reveals - no rashes, no evidence of scars, no suspicious lesions and no bruises.  Color - normal coloration of skin.    Ortho Exam  Peripheral Vascular  Lower Extremity   Edema - None bilaterally    Musculoskeletal  Lower Extremity   Left Hip    Normal range of motion    No crepitus, instability, subluxation or laxity    No known fractures or dislocations   Right Hip    Normal range of motion    No crepitus, instability, subluxation or laxity    No known fractures or dislocations     Inspection and palpation    Tenderness -      Right - positive over greater trochanter      Left - positive over greater trochanter     Swelling - none bilaterally    Tissue tension/texture is pliable and soft bilaterally     Normal warmth bilaterally   Strength and Tone    Left hip flexors - 5/5     Right hip flexors - 5/5   Deformities/Postures/Misalignments/Discrepancies    No leg length discrepancy   Functional Testing    Stinchfield test positive bilaterally    90-90 straight leg raise negative bilaterally          Imaging/Studies  Imaging Results (last 24 hours)     Procedure Component Value Units Date/Time    XR Pelvis 1 or 2 View [[id number]] Resulted:  09/13/17 0941     Updated:  09/13/17 0941    Narrative:       Pelvis X-Ray  Indication: Pain  AP and Frog    Findings:  No fracture  No bony lesion  Normal soft tissues  Normal joint spaces    No prior studies were available for comparison.              Assessment/Plan      Diane was seen today for hip pain.    Diagnoses and all orders for this visit:    Trochanteric bursitis of both hips    Pain  -     XR Pelvis 1 or 2 View    Poor circulation of extremity    Other orders  -     Left hip trochanteric bursa injection    I will do an injection of the left hip trochanteric bursa today.  Next week we will inject the right hip trochanteric bursa.  I will schedule  ankle-brachial index vascular studies to ascertain a possible vascular reason for the left leg coolness and discoloration.  She has a normal vascular exam today.  She had a normal venous Doppler.  I may need to refer her to vascular surgery.    Medical Decision Making  Management Options : over-the-counter medicine and prescription/IM medicine  Data/Risk: radiology tests and independent visualization of imaging, lab tests, or EMG/NCV    Jean-Paul Doan MD  09/13/17  9:54 AM    GENERAL APPEARANCE: awake, alert & oriented x 3, in no acute distress and well developed, well nourished  PSYCH: normal affect  LUNGS:  breathing nonlabored  EYES: sclera anicteric  CARDIOVASCULAR: palpable dorsalis pedis, palpable posterior tibial bilaterally. Capillary refill less than 2 seconds  EXTREMITIES: no clubbing, cyanosis  GAIT:  Normal

## 2017-09-15 DIAGNOSIS — M79.605 LEG PAIN, DIFFUSE, LEFT: Primary | ICD-10-CM

## 2017-09-18 ENCOUNTER — HOSPITAL ENCOUNTER (OUTPATIENT)
Dept: CARDIOLOGY | Facility: HOSPITAL | Age: 57
Discharge: HOME | End: 2017-09-18
Attending: ORTHOPAEDIC SURGERY | Admitting: ORTHOPAEDIC SURGERY

## 2017-09-18 DIAGNOSIS — M79.605 LEG PAIN, DIFFUSE, LEFT: ICD-10-CM

## 2017-09-18 LAB
BH CV LOWER ARTERIAL LEFT ABI RATIO: 1.22
BH CV LOWER ARTERIAL LEFT DORSALIS PEDIS SYS MAX: 162 MMHG
BH CV LOWER ARTERIAL LEFT POST EX ABI RATIO: 1.24
BH CV LOWER ARTERIAL LEFT POST TIBIAL SYS MAX: 175 MMHG
BH CV LOWER ARTERIAL RIGHT ABI RATIO: 1.21
BH CV LOWER ARTERIAL RIGHT DORSALIS PEDIS SYS MAX: 152 MMHG
BH CV LOWER ARTERIAL RIGHT POST EX ABI RATIO: 1.18
BH CV LOWER ARTERIAL RIGHT POST TIBIAL SYS MAX: 173 MMHG
UPPER ARTERIAL LEFT ARM BRACHIAL SYS MAX: 143 MMHG
UPPER ARTERIAL RIGHT ARM BRACHIAL SYS MAX: 135 MMHG

## 2017-09-18 PROCEDURE — 93923 UPR/LXTR ART STDY 3+ LVLS: CPT | Performed by: INTERNAL MEDICINE

## 2017-09-18 PROCEDURE — 93923 UPR/LXTR ART STDY 3+ LVLS: CPT

## 2017-09-20 ENCOUNTER — TELEPHONE (OUTPATIENT)
Dept: INTERNAL MEDICINE | Facility: CLINIC | Age: 57
End: 2017-09-20

## 2017-09-21 ENCOUNTER — TELEPHONE (OUTPATIENT)
Dept: ENDOCRINOLOGY | Facility: CLINIC | Age: 57
End: 2017-09-21

## 2017-09-21 NOTE — TELEPHONE ENCOUNTER
----- Message from Clover BOSTON MA sent at 9/19/2017 11:35 AM EDT -----  Called to inform pt no answer left vm to return my call.

## 2017-09-22 RX ORDER — OXYBUTYNIN CHLORIDE 5 MG/1
TABLET ORAL
Qty: 60 TABLET | Refills: 0 | OUTPATIENT
Start: 2017-09-22

## 2017-10-20 ENCOUNTER — OFFICE VISIT (OUTPATIENT)
Dept: INTERNAL MEDICINE | Facility: CLINIC | Age: 57
End: 2017-10-20

## 2017-10-20 VITALS
BODY MASS INDEX: 32.65 KG/M2 | WEIGHT: 196 LBS | SYSTOLIC BLOOD PRESSURE: 162 MMHG | HEART RATE: 62 BPM | OXYGEN SATURATION: 99 % | HEIGHT: 65 IN | DIASTOLIC BLOOD PRESSURE: 82 MMHG

## 2017-10-20 DIAGNOSIS — I10 ESSENTIAL HYPERTENSION: ICD-10-CM

## 2017-10-20 DIAGNOSIS — D72.819 LEUKOPENIA, UNSPECIFIED TYPE: ICD-10-CM

## 2017-10-20 DIAGNOSIS — R10.32 LEFT LOWER QUADRANT PAIN: Primary | ICD-10-CM

## 2017-10-20 LAB
BASOPHILS # BLD AUTO: 0.01 10*3/MM3 (ref 0–0.2)
BASOPHILS NFR BLD AUTO: 0.3 % (ref 0–1)
BILIRUB BLD-MCNC: NEGATIVE MG/DL
CLARITY, POC: CLEAR
COLOR UR: YELLOW
DEPRECATED RDW RBC AUTO: 47.7 FL (ref 37–54)
EOSINOPHIL # BLD AUTO: 0.13 10*3/MM3 (ref 0–0.3)
EOSINOPHIL NFR BLD AUTO: 3.6 % (ref 0–3)
ERYTHROCYTE [DISTWIDTH] IN BLOOD BY AUTOMATED COUNT: 13.9 % (ref 11.3–14.5)
GLUCOSE UR STRIP-MCNC: NEGATIVE MG/DL
HCT VFR BLD AUTO: 40.3 % (ref 34.5–44)
HGB BLD-MCNC: 13.1 G/DL (ref 11.5–15.5)
IMM GRANULOCYTES # BLD: 0.01 10*3/MM3 (ref 0–0.03)
IMM GRANULOCYTES NFR BLD: 0.3 % (ref 0–0.6)
KETONES UR QL: NEGATIVE
LEUKOCYTE EST, POC: NEGATIVE
LYMPHOCYTES # BLD AUTO: 1.37 10*3/MM3 (ref 0.6–4.8)
LYMPHOCYTES NFR BLD AUTO: 38.2 % (ref 24–44)
MCH RBC QN AUTO: 30.5 PG (ref 27–31)
MCHC RBC AUTO-ENTMCNC: 32.5 G/DL (ref 32–36)
MCV RBC AUTO: 93.7 FL (ref 80–99)
MONOCYTES # BLD AUTO: 0.69 10*3/MM3 (ref 0–1)
MONOCYTES NFR BLD AUTO: 19.2 % (ref 0–12)
NEUTROPHILS # BLD AUTO: 1.38 10*3/MM3 (ref 1.5–8.3)
NEUTROPHILS NFR BLD AUTO: 38.4 % (ref 41–71)
NITRITE UR-MCNC: NEGATIVE MG/ML
PH UR: 8 [PH] (ref 5–8)
PLATELET # BLD AUTO: 268 10*3/MM3 (ref 150–450)
PMV BLD AUTO: 10.6 FL (ref 6–12)
PROT UR STRIP-MCNC: NEGATIVE MG/DL
RBC # BLD AUTO: 4.3 10*6/MM3 (ref 3.89–5.14)
RBC # UR STRIP: NEGATIVE /UL
SP GR UR: 1.01 (ref 1–1.03)
UROBILINOGEN UR QL: NORMAL
WBC NRBC COR # BLD: 3.59 10*3/MM3 (ref 3.5–10.8)

## 2017-10-20 PROCEDURE — 87086 URINE CULTURE/COLONY COUNT: CPT | Performed by: NURSE PRACTITIONER

## 2017-10-20 PROCEDURE — 99214 OFFICE O/P EST MOD 30 MIN: CPT | Performed by: NURSE PRACTITIONER

## 2017-10-20 PROCEDURE — 85025 COMPLETE CBC W/AUTO DIFF WBC: CPT | Performed by: NURSE PRACTITIONER

## 2017-10-20 PROCEDURE — 81003 URINALYSIS AUTO W/O SCOPE: CPT | Performed by: NURSE PRACTITIONER

## 2017-10-20 RX ORDER — AMLODIPINE BESYLATE 10 MG/1
10 TABLET ORAL DAILY
Qty: 30 TABLET | Refills: 11 | Status: SHIPPED | OUTPATIENT
Start: 2017-10-20 | End: 2018-06-25 | Stop reason: SDUPTHER

## 2017-10-20 NOTE — PROGRESS NOTES
"Subjective  Urinary Retention (x 1 week)      Diane Sood is a 57 y.o. female.   No Known Allergies  History of Present Illness      Pressure in left side , does not feel she is emptying the bladder , is bears down she does feel she is emptying bladder, started 1 week ago, not worsening, taking AZO but not working     B/p has been elevated last few visits, started walking program 1 month ago , eating healthier ( or trying), no cp, no soa   The following portions of the patient's history were reviewed and updated as appropriate: allergies, past family history, past social history and problem list.    Review of Systems   Gastrointestinal: Positive for abdominal pain.   Genitourinary: Positive for difficulty urinating.   All other systems reviewed and are negative.      Objective   Physical Exam   Constitutional: She appears well-developed and well-nourished.   HENT:   Head: Normocephalic and atraumatic.   Eyes: Conjunctivae are normal.   Cardiovascular: Normal rate and regular rhythm.    Pulmonary/Chest: Effort normal and breath sounds normal.   Abdominal: Soft. Bowel sounds are normal. There is tenderness in the left lower quadrant.   Skin: Skin is warm and dry.   Psychiatric: She has a normal mood and affect. Her behavior is normal. Judgment and thought content normal.   Nursing note and vitals reviewed.    /82  Pulse 62  Ht 65.35\" (166 cm)  Wt 196 lb (88.9 kg)  SpO2 99%  BMI 32.27 kg/m2    Assessment/Plan     Encounter Diagnoses   Name Primary?   • Left lower quadrant pain Yes   • Essential hypertension    • Leukopenia, unspecified type      Will send urine for cx and call her on Monday, if neg will send for CT, we have added norvasc to get her b/p wnl          Results for orders placed or performed in visit on 10/20/17   POCT urinalysis dipstick, automated   Result Value Ref Range    Color Yellow Yellow, Straw, Dark Yellow, Jessie    Clarity, UA Clear Clear    Glucose, UA Negative Negative, " 1000 mg/dL (3+) mg/dL    Bilirubin Negative Negative    Ketones, UA Negative Negative    Specific Gravity  1.010 1.005 - 1.030    Blood, UA Negative Negative    pH, Urine 8.0 5.0 - 8.0    Protein, POC Negative Negative mg/dL    Urobilinogen, UA Normal Normal    Leukocytes Negative Negative    Nitrite, UA Negative Negative

## 2017-10-30 ENCOUNTER — TELEPHONE (OUTPATIENT)
Dept: INTERNAL MEDICINE | Facility: CLINIC | Age: 57
End: 2017-10-30

## 2017-10-31 DIAGNOSIS — N32.81 OAB (OVERACTIVE BLADDER): Primary | ICD-10-CM

## 2017-11-06 RX ORDER — ZOLPIDEM TARTRATE 10 MG/1
TABLET ORAL
Qty: 30 TABLET | Refills: 0 | Status: SHIPPED | OUTPATIENT
Start: 2017-11-06 | End: 2017-12-29 | Stop reason: SDUPTHER

## 2017-12-29 DIAGNOSIS — G47.00 INSOMNIA, UNSPECIFIED TYPE: Primary | ICD-10-CM

## 2018-01-02 RX ORDER — ZOLPIDEM TARTRATE 10 MG/1
TABLET ORAL
Qty: 30 TABLET | Refills: 0 | Status: CANCELLED | OUTPATIENT
Start: 2018-01-02

## 2018-01-02 RX ORDER — ZOLPIDEM TARTRATE 10 MG/1
TABLET ORAL
Qty: 30 TABLET | Refills: 0 | Status: SHIPPED | OUTPATIENT
Start: 2018-01-02 | End: 2018-01-03

## 2018-01-02 NOTE — TELEPHONE ENCOUNTER
LOV 10/20/17  MYLA UPDATED TODAY  CONSENT NEEDS TO BE SIGNED.   LAST FILLED 11/6/17.  PLEASE ADVISE.

## 2018-01-02 NOTE — TELEPHONE ENCOUNTER
In all Np nurse going back to July 2017, no documentation that being seen for insomnia. Needs office visit for documentaion

## 2018-01-04 RX ORDER — ZOLPIDEM TARTRATE 10 MG/1
10 TABLET ORAL NIGHTLY PRN
Qty: 30 TABLET | Refills: 0 | Status: SHIPPED | OUTPATIENT
Start: 2018-01-04 | End: 2018-02-05 | Stop reason: SDUPTHER

## 2018-01-10 ENCOUNTER — OFFICE VISIT (OUTPATIENT)
Dept: INTERNAL MEDICINE | Facility: CLINIC | Age: 58
End: 2018-01-10

## 2018-01-10 VITALS
RESPIRATION RATE: 12 BRPM | HEART RATE: 58 BPM | HEIGHT: 65 IN | WEIGHT: 187.4 LBS | OXYGEN SATURATION: 97 % | DIASTOLIC BLOOD PRESSURE: 80 MMHG | BODY MASS INDEX: 31.22 KG/M2 | SYSTOLIC BLOOD PRESSURE: 112 MMHG

## 2018-01-10 DIAGNOSIS — G47.00 INSOMNIA, UNSPECIFIED TYPE: Primary | ICD-10-CM

## 2018-01-10 PROCEDURE — 99212 OFFICE O/P EST SF 10 MIN: CPT | Performed by: NURSE PRACTITIONER

## 2018-01-10 RX ORDER — OXYBUTYNIN CHLORIDE 5 MG/1
TABLET, EXTENDED RELEASE ORAL
Refills: 4 | COMMUNITY
Start: 2017-12-25 | End: 2018-06-25

## 2018-01-10 NOTE — PROGRESS NOTES
"Subjective  Follow-up (Maintenance Check Up and Med Refill. )      Diane Denson is a 57 y.o. female.   No Known Allergies  History of Present Illness      Pt states she is doing well, states she was told needed to come in so she could be seen for ambien maintenance , her UDS, Justin and controlled substance contract is utd, she will call in 2/18 when refill is needed, no problems on this medication  The following portions of the patient's history were reviewed and updated as appropriate: allergies, past family history, past surgical history and problem list.    Review of Systems   Psychiatric/Behavioral: Positive for sleep disturbance.   All other systems reviewed and are negative.      Objective   Physical Exam   Constitutional: She is oriented to person, place, and time. She appears well-nourished.   Neurological: She is alert and oriented to person, place, and time.   Psychiatric: She has a normal mood and affect. Her behavior is normal. Judgment and thought content normal.   Nursing note and vitals reviewed.    /80  Pulse 58  Resp 12  Ht 166 cm (65.35\")  Wt 85 kg (187 lb 6.4 oz)  SpO2 97%  BMI 30.85 kg/m2    Assessment/Plan     Problem List Items Addressed This Visit        Other    Insomnia - Primary        Pt has UDS, controlled substance contract and justin utd , rtc as directed       "

## 2018-02-05 RX ORDER — ZOLPIDEM TARTRATE 10 MG/1
TABLET ORAL
Qty: 30 TABLET | Refills: 0 | Status: SHIPPED | OUTPATIENT
Start: 2018-02-05 | End: 2018-03-07 | Stop reason: SDUPTHER

## 2018-02-06 ENCOUNTER — TELEPHONE (OUTPATIENT)
Dept: INTERNAL MEDICINE | Facility: CLINIC | Age: 58
End: 2018-02-06

## 2018-02-06 NOTE — TELEPHONE ENCOUNTER
Spoke with patient informed patient Rx was ready to be picked up and would be up front Patient verbalized understanding

## 2018-03-08 ENCOUNTER — OFFICE VISIT (OUTPATIENT)
Dept: INTERNAL MEDICINE | Facility: CLINIC | Age: 58
End: 2018-03-08

## 2018-03-08 VITALS
RESPIRATION RATE: 18 BRPM | WEIGHT: 179.5 LBS | BODY MASS INDEX: 29.55 KG/M2 | DIASTOLIC BLOOD PRESSURE: 70 MMHG | SYSTOLIC BLOOD PRESSURE: 138 MMHG | HEART RATE: 68 BPM

## 2018-03-08 DIAGNOSIS — G89.29 CHRONIC PAIN OF BOTH SHOULDERS: ICD-10-CM

## 2018-03-08 DIAGNOSIS — M54.2 NECK PAIN: Primary | ICD-10-CM

## 2018-03-08 DIAGNOSIS — M25.512 CHRONIC PAIN OF BOTH SHOULDERS: ICD-10-CM

## 2018-03-08 DIAGNOSIS — M25.511 CHRONIC PAIN OF BOTH SHOULDERS: ICD-10-CM

## 2018-03-08 DIAGNOSIS — G44.59 OTHER COMPLICATED HEADACHE SYNDROME: ICD-10-CM

## 2018-03-08 PROCEDURE — 99214 OFFICE O/P EST MOD 30 MIN: CPT | Performed by: NURSE PRACTITIONER

## 2018-03-08 RX ORDER — CYCLOBENZAPRINE HCL 10 MG
10 TABLET ORAL 3 TIMES DAILY PRN
Qty: 30 TABLET | Refills: 0 | Status: SHIPPED | OUTPATIENT
Start: 2018-03-08 | End: 2018-06-25 | Stop reason: SDUPTHER

## 2018-03-08 RX ORDER — PREDNISONE 20 MG/1
TABLET ORAL
Qty: 19 TABLET | Refills: 0 | Status: SHIPPED | OUTPATIENT
Start: 2018-03-08 | End: 2018-06-25

## 2018-03-08 RX ORDER — ZOLPIDEM TARTRATE 10 MG/1
TABLET ORAL
Qty: 30 TABLET | Refills: 0 | Status: SHIPPED | OUTPATIENT
Start: 2018-03-08 | End: 2018-04-12 | Stop reason: SDUPTHER

## 2018-03-08 NOTE — PROGRESS NOTES
Subjective  Back Pain and Neck Pain      Diane Denson is a 57 y.o. female.   No Known Allergies  History of Present Illness      Neck and shoulders hurting , headaches , mid back pain, has had for year or longer , but has worsened, taking ibuprofen but they no longer working. Breasts are DD and she thinks hurting her   The following portions of the patient's history were reviewed and updated as appropriate: allergies, past family history, past surgical history and problem list.    Review of Systems   Musculoskeletal: Positive for arthralgias, back pain and neck pain.   Neurological: Positive for headaches.   All other systems reviewed and are negative.      Objective   Physical Exam   Constitutional: She is oriented to person, place, and time. She appears well-developed and well-nourished.   HENT:   Head: Normocephalic and atraumatic.   Cardiovascular: Normal rate.    Pulmonary/Chest: Effort normal.   Musculoskeletal:        Right shoulder: She exhibits tenderness, pain and spasm.        Left shoulder: She exhibits tenderness, pain and spasm.        Cervical back: She exhibits tenderness, edema and pain.        Thoracic back: She exhibits tenderness and pain.   Neurological: She is alert and oriented to person, place, and time.   Skin: Skin is warm and dry.   Psychiatric: She has a normal mood and affect. Her behavior is normal. Judgment and thought content normal.   Nursing note and vitals reviewed.    /70 (BP Location: Right arm, Patient Position: Sitting)  Pulse 68  Resp 18  Wt 81.4 kg (179 lb 8 oz)  BMI 29.55 kg/m2    Assessment/Plan     Problem List Items Addressed This Visit        Nervous and Auditory    Neck pain - Primary    Relevant Medications    predniSONE (DELTASONE) 20 MG tablet    cyclobenzaprine (FLEXERIL) 10 MG tablet      Other Visit Diagnoses     Chronic pain of both shoulders        Relevant Medications    predniSONE (DELTASONE) 20 MG tablet    cyclobenzaprine (FLEXERIL) 10  MG tablet    Other complicated headache syndrome        Relevant Medications    predniSONE (DELTASONE) 20 MG tablet    cyclobenzaprine (FLEXERIL) 10 MG tablet             order given for PT and meds as above, rtc as needed

## 2018-04-12 RX ORDER — ZOLPIDEM TARTRATE 10 MG/1
TABLET ORAL
Qty: 30 TABLET | Refills: 0 | Status: SHIPPED | OUTPATIENT
Start: 2018-04-12 | End: 2018-05-15 | Stop reason: SDUPTHER

## 2018-05-15 ENCOUNTER — TELEPHONE (OUTPATIENT)
Dept: INTERNAL MEDICINE | Facility: CLINIC | Age: 58
End: 2018-05-15

## 2018-05-15 RX ORDER — ZOLPIDEM TARTRATE 10 MG/1
10 TABLET ORAL NIGHTLY PRN
Qty: 30 TABLET | Refills: 0 | Status: SHIPPED | OUTPATIENT
Start: 2018-05-15 | End: 2018-06-25

## 2018-05-15 RX ORDER — ZOLPIDEM TARTRATE 10 MG/1
TABLET ORAL
Qty: 30 TABLET | Refills: 0 | Status: CANCELLED | OUTPATIENT
Start: 2018-05-15

## 2018-05-16 RX ORDER — ZOLPIDEM TARTRATE 10 MG/1
TABLET ORAL
Qty: 30 TABLET | Refills: 0 | OUTPATIENT
Start: 2018-05-16

## 2018-06-22 RX ORDER — ZOLPIDEM TARTRATE 10 MG/1
TABLET ORAL
Qty: 30 TABLET | Refills: 0 | OUTPATIENT
Start: 2018-06-22

## 2018-06-25 ENCOUNTER — OFFICE VISIT (OUTPATIENT)
Dept: INTERNAL MEDICINE | Facility: CLINIC | Age: 58
End: 2018-06-25

## 2018-06-25 VITALS
HEART RATE: 63 BPM | OXYGEN SATURATION: 98 % | WEIGHT: 175 LBS | BODY MASS INDEX: 28.81 KG/M2 | DIASTOLIC BLOOD PRESSURE: 76 MMHG | SYSTOLIC BLOOD PRESSURE: 128 MMHG

## 2018-06-25 DIAGNOSIS — M54.42 CHRONIC BILATERAL LOW BACK PAIN WITH BILATERAL SCIATICA: ICD-10-CM

## 2018-06-25 DIAGNOSIS — M54.41 CHRONIC BILATERAL LOW BACK PAIN WITH BILATERAL SCIATICA: ICD-10-CM

## 2018-06-25 DIAGNOSIS — M54.2 NECK PAIN: ICD-10-CM

## 2018-06-25 DIAGNOSIS — G89.29 CHRONIC BILATERAL LOW BACK PAIN WITH BILATERAL SCIATICA: ICD-10-CM

## 2018-06-25 DIAGNOSIS — G47.00 INSOMNIA, UNSPECIFIED TYPE: ICD-10-CM

## 2018-06-25 DIAGNOSIS — M25.511 CHRONIC PAIN OF BOTH SHOULDERS: ICD-10-CM

## 2018-06-25 DIAGNOSIS — G89.29 CHRONIC PAIN OF BOTH SHOULDERS: ICD-10-CM

## 2018-06-25 DIAGNOSIS — G62.9 NEUROPATHY: Primary | ICD-10-CM

## 2018-06-25 DIAGNOSIS — M25.512 CHRONIC PAIN OF BOTH SHOULDERS: ICD-10-CM

## 2018-06-25 DIAGNOSIS — I10 ESSENTIAL HYPERTENSION: ICD-10-CM

## 2018-06-25 PROCEDURE — 99214 OFFICE O/P EST MOD 30 MIN: CPT | Performed by: PHYSICIAN ASSISTANT

## 2018-06-25 RX ORDER — CYCLOBENZAPRINE HCL 10 MG
10 TABLET ORAL 3 TIMES DAILY PRN
Qty: 30 TABLET | Refills: 0 | Status: SHIPPED | OUTPATIENT
Start: 2018-06-25 | End: 2019-10-25 | Stop reason: SDUPTHER

## 2018-06-25 RX ORDER — LOSARTAN POTASSIUM AND HYDROCHLOROTHIAZIDE 25; 100 MG/1; MG/1
1 TABLET ORAL DAILY
COMMUNITY
End: 2018-06-25

## 2018-06-25 RX ORDER — AMITRIPTYLINE HYDROCHLORIDE 25 MG/1
TABLET, FILM COATED ORAL
Qty: 30 TABLET | Refills: 3 | Status: SHIPPED | OUTPATIENT
Start: 2018-06-25 | End: 2018-07-12 | Stop reason: SDUPTHER

## 2018-06-25 RX ORDER — AMLODIPINE BESYLATE 10 MG/1
10 TABLET ORAL DAILY
Qty: 30 TABLET | Refills: 11 | Status: SHIPPED | OUTPATIENT
Start: 2018-06-25 | End: 2019-07-11 | Stop reason: SDUPTHER

## 2018-06-25 NOTE — PROGRESS NOTES
Chief Complaint   Patient presents with   • Med Refill       Subjective   Diane Denson is a 57 y.o. female.       History of Present Illness     Pt has been on ambien for insomnia for years, takes it prn and often takes 1/2 a tablet. Has been on ambien for a few years. Would like to try something that is not controlled.    She notes that she has chronic hip pain and numbness in her bilateral feet- has had extensive testing with [facility], venous duplex and exercise IV[facility]N. She also notes some lower back pain and neck pain. Has not tried PT for her back pain. Tried gabapentin for numbness, made her too sleepy.        Current Outpatient Prescriptions:   •  albuterol (PROAIR HFA) 108 (90 BASE) MCG/ACT inhaler, Inhale 2 puffs Every 4 (Four) Hours As Needed for Wheezing., Disp: 1 inhaler, Rfl: 1  •  amLODIPine (NORVASC) 10 MG tablet, Take 1 tablet by mouth Daily., Disp: 30 tablet, Rfl: 11  •  cyclobenzaprine (FLEXERIL) 10 MG tablet, Take 1 tablet by mouth 3 (Three) Times a Day As Needed for Muscle Spasms., Disp: 30 tablet, Rfl: 0  •  folic acid (FOLVITE) 1 MG tablet, , Disp: , Rfl: 4  •  methotrexate 2.5 MG tablet, TAKE 6 TABLETS WEEKLY. (TAKE 3 TABLETS IN THE MORNING & 3 TABLETS 12 HRS LATER) REPEAT WEEKLY, Disp: , Rfl: 4  •  amitriptyline (ELAVIL) 25 MG tablet, Take 1-2 tablets at night as needed., Disp: 30 tablet, Rfl: 3  •  linaclotide (LINZESS) 145 MCG capsule capsule, Take 1 capsule by mouth Every Morning Before Breakfast., Disp: 30 capsule, Rfl: 5  •  polyethylene glycol (MIRALAX) packet, Take 17 g by mouth Daily., Disp: 30 each, Rfl: 3     PMFSH  The following portions of the patient's history were reviewed and updated as appropriate: allergies, current medications, past family history, past medical history, past social history, past surgical history and problem list.    Review of Systems   Constitutional: Negative for appetite change, fever and unexpected weight change.   HENT: Negative.    Eyes: Negative  for pain and visual disturbance.   Respiratory: Negative for chest tightness, shortness of breath and wheezing.    Cardiovascular: Negative for chest pain and palpitations.   Gastrointestinal: Negative for abdominal pain, blood in stool, diarrhea, nausea and vomiting.   Endocrine: Negative.    Genitourinary: Negative for difficulty urinating, flank pain, frequency and urgency.   Musculoskeletal: Positive for back pain. Negative for joint swelling.   Skin: Negative for color change and rash.   Neurological: Positive for numbness. Negative for dizziness, tremors, weakness and headaches.   Hematological: Negative for adenopathy.   Psychiatric/Behavioral: Negative for confusion and decreased concentration.   All other systems reviewed and are negative.      Objective   /76   Pulse 63   Wt 79.4 kg (175 lb)   SpO2 98%   BMI 28.81 kg/m²     Physical Exam   Constitutional: She appears well-developed and well-nourished.   HENT:   Head: Normocephalic.   Right Ear: Hearing, tympanic membrane, external ear and ear canal normal.   Left Ear: Hearing, tympanic membrane, external ear and ear canal normal.   Nose: Nose normal.   Mouth/Throat: Oropharynx is clear and moist.   Eyes: Conjunctivae are normal. Pupils are equal, round, and reactive to light.   Neck: Normal range of motion.   Cardiovascular: Normal rate, regular rhythm and normal heart sounds.    Pulses:       Radial pulses are 2+ on the right side, and 2+ on the left side.        Dorsalis pedis pulses are 2+ on the right side, and 2+ on the left side.   Pulmonary/Chest: Effort normal and breath sounds normal. She has no decreased breath sounds. She has no wheezes. She has no rhonchi. She has no rales.   Musculoskeletal: Normal range of motion.   Neurological: She is alert.   Skin: Skin is warm and dry.   Psychiatric: She has a normal mood and affect. Her behavior is normal.   Nursing note and vitals reviewed.        ASSESSMENT/PLAN    Problem List Items  Addressed This Visit        Cardiovascular and Mediastinum    Hypertension     Hypertension is unchanged.  Continue current treatment regimen.  Dietary sodium restriction.  Weight loss.  Regular aerobic exercise.  Continue current medications.  Blood pressure will be reassessed at the next regular appointment.         Relevant Medications    amLODIPine (NORVASC) 10 MG tablet       Nervous and Auditory    Neck pain    Relevant Medications    cyclobenzaprine (FLEXERIL) 10 MG tablet    Other Relevant Orders    Ambulatory Referral to Physical Therapy Evaluate and treat    Neuropathy - Primary     Trial of amitriptyline qhs to help with insomnia and nerve pain.         Relevant Medications    amitriptyline (ELAVIL) 25 MG tablet       Other    Insomnia     Discussed non controlled options for insomnia including hydroxyzine, amitriptyline and trazodone. Decided to try amitriptyline to help with sleep and tingling in feet.           Other Visit Diagnoses     Chronic pain of both shoulders        Relevant Medications    cyclobenzaprine (FLEXERIL) 10 MG tablet    Other Relevant Orders    Ambulatory Referral to Physical Therapy Evaluate and treat    Chronic bilateral low back pain with bilateral sciatica        Relevant Orders    Ambulatory Referral to Physical Therapy Evaluate and treat               Return in about 3 months (around 9/25/2018) for Annual physical.

## 2018-06-26 NOTE — ASSESSMENT & PLAN NOTE
Discussed non controlled options for insomnia including hydroxyzine, amitriptyline and trazodone. Decided to try amitriptyline to help with sleep and tingling in feet.

## 2018-06-26 NOTE — ASSESSMENT & PLAN NOTE
Hypertension is unchanged.  Continue current treatment regimen.  Dietary sodium restriction.  Weight loss.  Regular aerobic exercise.  Continue current medications.  Blood pressure will be reassessed at the next regular appointment.

## 2018-07-10 ENCOUNTER — TELEPHONE (OUTPATIENT)
Dept: INTERNAL MEDICINE | Facility: CLINIC | Age: 58
End: 2018-07-10

## 2018-07-10 DIAGNOSIS — G62.9 NEUROPATHY: ICD-10-CM

## 2018-07-10 NOTE — TELEPHONE ENCOUNTER
PT CALLED TO LEAVE A MESSAGE FOR [facility] LUA OR LUPIS [facility]/ THE PT STATES SHE WAS PRESCRIBED MEDICATION TO HELP THE PT SLEEP AT NIGH TIME BUT WAS UNSURE OF THE NAME. THE PT STATES THE MEDICINE IS TOO STRONG AND THE PT CAN'T TAKE IT DUE TO THE SEVERITY IN THE DOSAGE. PT ALSO WANTED TO SEE ABOUT GETTING BACK ON THE AMBIEN MEDICATION SHE WAS ON PRIOR. BEST CALL BACK # [phone]

## 2018-07-12 RX ORDER — AMITRIPTYLINE HYDROCHLORIDE 10 MG/1
TABLET, FILM COATED ORAL
Qty: 30 TABLET | Refills: 3 | Status: SHIPPED | OUTPATIENT
Start: 2018-07-12 | End: 2018-10-16 | Stop reason: SDUPTHER

## 2018-07-12 NOTE — TELEPHONE ENCOUNTER
Called and asked pt, per pt the tablet is too small and she cannot cut it in half without it crumbling.

## 2018-09-17 ENCOUNTER — TELEPHONE (OUTPATIENT)
Dept: INTERNAL MEDICINE | Facility: CLINIC | Age: 58
End: 2018-09-17

## 2018-09-17 DIAGNOSIS — Z12.39 BREAST CANCER SCREENING: Primary | ICD-10-CM

## 2018-09-17 NOTE — TELEPHONE ENCOUNTER
PT WOULD LIKE TO KNOW IF IT WERE POSSIBLE TO HAVE [facility] REFERRAL HER FOR A MAMMOGRAM SO THAT SHE CAN HAVE IT DONE THE SAME DAY AS HER PHYSICAL? THE PT WAS TOMMY KING'S PT BEFORE SHE LEFT. PT WOULD LIKE TO GET A CALL BACK IN REGARDS TO HER MAMMOGRAM REFERRAL [phone]

## 2018-10-02 ENCOUNTER — TRANSCRIBE ORDERS (OUTPATIENT)
Dept: ADMINISTRATIVE | Facility: HOSPITAL | Age: 58
End: 2018-10-02

## 2018-10-02 DIAGNOSIS — Z12.31 VISIT FOR SCREENING MAMMOGRAM: Primary | ICD-10-CM

## 2018-10-16 ENCOUNTER — OFFICE VISIT (OUTPATIENT)
Dept: INTERNAL MEDICINE | Facility: CLINIC | Age: 58
End: 2018-10-16

## 2018-10-16 VITALS
HEART RATE: 86 BPM | OXYGEN SATURATION: 94 % | HEIGHT: 63 IN | WEIGHT: 182 LBS | DIASTOLIC BLOOD PRESSURE: 78 MMHG | BODY MASS INDEX: 32.25 KG/M2 | SYSTOLIC BLOOD PRESSURE: 118 MMHG

## 2018-10-16 DIAGNOSIS — G47.00 INSOMNIA, UNSPECIFIED TYPE: ICD-10-CM

## 2018-10-16 DIAGNOSIS — G62.9 NEUROPATHY: ICD-10-CM

## 2018-10-16 DIAGNOSIS — Z78.0 POST-MENOPAUSAL: ICD-10-CM

## 2018-10-16 DIAGNOSIS — Z00.00 HEALTH CARE MAINTENANCE: Primary | ICD-10-CM

## 2018-10-16 DIAGNOSIS — Z12.11 COLON CANCER SCREENING: ICD-10-CM

## 2018-10-16 LAB
25(OH)D3 SERPL-MCNC: 27 NG/ML
ALBUMIN SERPL-MCNC: 4.31 G/DL (ref 3.2–4.8)
ALBUMIN/GLOB SERPL: 1.8 G/DL (ref 1.5–2.5)
ALP SERPL-CCNC: 135 U/L (ref 25–100)
ALT SERPL W P-5'-P-CCNC: 23 U/L (ref 7–40)
ANION GAP SERPL CALCULATED.3IONS-SCNC: 4 MMOL/L (ref 3–11)
ARTICHOKE IGE QN: 128 MG/DL (ref 0–130)
AST SERPL-CCNC: 27 U/L (ref 0–33)
BASOPHILS # BLD AUTO: 0.01 10*3/MM3 (ref 0–0.2)
BASOPHILS NFR BLD AUTO: 0.3 % (ref 0–1)
BILIRUB SERPL-MCNC: 0.3 MG/DL (ref 0.3–1.2)
BUN BLD-MCNC: 12 MG/DL (ref 9–23)
BUN/CREAT SERPL: 13 (ref 7–25)
CALCIUM SPEC-SCNC: 9.3 MG/DL (ref 8.7–10.4)
CHLORIDE SERPL-SCNC: 103 MMOL/L (ref 99–109)
CHOLEST SERPL-MCNC: 196 MG/DL (ref 0–200)
CO2 SERPL-SCNC: 30 MMOL/L (ref 20–31)
CREAT BLD-MCNC: 0.92 MG/DL (ref 0.6–1.3)
DEPRECATED RDW RBC AUTO: 42.1 FL (ref 37–54)
EOSINOPHIL # BLD AUTO: 0.09 10*3/MM3 (ref 0–0.3)
EOSINOPHIL NFR BLD AUTO: 2.5 % (ref 0–3)
ERYTHROCYTE [DISTWIDTH] IN BLOOD BY AUTOMATED COUNT: 12.2 % (ref 11.3–14.5)
GFR SERPL CREATININE-BSD FRML MDRD: 75 ML/MIN/1.73
GLOBULIN UR ELPH-MCNC: 2.4 GM/DL
GLUCOSE BLD-MCNC: 77 MG/DL (ref 70–100)
HCT VFR BLD AUTO: 44 % (ref 34.5–44)
HDLC SERPL-MCNC: 57 MG/DL (ref 40–60)
HGB BLD-MCNC: 13.9 G/DL (ref 11.5–15.5)
IMM GRANULOCYTES # BLD: 0 10*3/MM3 (ref 0–0.03)
IMM GRANULOCYTES NFR BLD: 0 % (ref 0–0.6)
LYMPHOCYTES # BLD AUTO: 1.39 10*3/MM3 (ref 0.6–4.8)
LYMPHOCYTES NFR BLD AUTO: 38 % (ref 24–44)
MCH RBC QN AUTO: 29.6 PG (ref 27–31)
MCHC RBC AUTO-ENTMCNC: 31.6 G/DL (ref 32–36)
MCV RBC AUTO: 93.6 FL (ref 80–99)
MONOCYTES # BLD AUTO: 0.48 10*3/MM3 (ref 0–1)
MONOCYTES NFR BLD AUTO: 13.1 % (ref 0–12)
NEUTROPHILS # BLD AUTO: 1.69 10*3/MM3 (ref 1.5–8.3)
NEUTROPHILS NFR BLD AUTO: 46.1 % (ref 41–71)
PLATELET # BLD AUTO: 260 10*3/MM3 (ref 150–450)
PMV BLD AUTO: 10.4 FL (ref 6–12)
POTASSIUM BLD-SCNC: 4.2 MMOL/L (ref 3.5–5.5)
PROT SERPL-MCNC: 6.7 G/DL (ref 5.7–8.2)
RBC # BLD AUTO: 4.7 10*6/MM3 (ref 3.89–5.14)
SODIUM BLD-SCNC: 137 MMOL/L (ref 132–146)
TRIGL SERPL-MCNC: 85 MG/DL (ref 0–150)
TSH SERPL DL<=0.05 MIU/L-ACNC: 2.32 MIU/ML (ref 0.35–5.35)
WBC NRBC COR # BLD: 3.66 10*3/MM3 (ref 3.5–10.8)

## 2018-10-16 PROCEDURE — 99396 PREV VISIT EST AGE 40-64: CPT | Performed by: PHYSICIAN ASSISTANT

## 2018-10-16 PROCEDURE — 85025 COMPLETE CBC W/AUTO DIFF WBC: CPT | Performed by: PHYSICIAN ASSISTANT

## 2018-10-16 PROCEDURE — 84443 ASSAY THYROID STIM HORMONE: CPT | Performed by: PHYSICIAN ASSISTANT

## 2018-10-16 PROCEDURE — 80061 LIPID PANEL: CPT | Performed by: PHYSICIAN ASSISTANT

## 2018-10-16 PROCEDURE — 80053 COMPREHEN METABOLIC PANEL: CPT | Performed by: PHYSICIAN ASSISTANT

## 2018-10-16 PROCEDURE — 82306 VITAMIN D 25 HYDROXY: CPT | Performed by: PHYSICIAN ASSISTANT

## 2018-10-16 RX ORDER — AMITRIPTYLINE HYDROCHLORIDE 25 MG/1
25 TABLET, FILM COATED ORAL NIGHTLY
Qty: 30 TABLET | Refills: 5 | Status: SHIPPED | OUTPATIENT
Start: 2018-10-16 | End: 2019-06-04 | Stop reason: SDUPTHER

## 2018-10-16 NOTE — PROGRESS NOTES
"Chief Complaint   Patient presents with   • Annual Exam       Subjective   Diane Denson is a 58 y.o. female.       History of Present Illness     The patient is being seen for a health maintenance evaluation.  The last health maintenance was 1 year(s) ago.    Social History  Diane  does not smoke cigarettes.   She drinks no alcohol.  She does not use illicit drugs.    General History  Diane  does have regular dental visits.  She does complain of vision problems. Wears reading glasses. Last eye exam was 4 years ago.  Immunizations are not up to date. The patient needs the following immunizations: declines influenza vaccine; gets tetanus at [facility]; shingrix vaccine    Lifestyle  Diane  consumes in general, a \"healthy\" diet  .  She exercises intermittently.    Reproductive Health  Diane  is postmenopausal.  She reports no periods since total hysterectomy.  She is sexually active. Her contraceptive plan is no method.    Screening  Last pap was 7 years ago by Dr Escalante, no longer needed.  Last mammogram was  2017.  [facility]  Last DEXA was 2016.     Pt tried the amitriptyline, the 10 mg did not make her as sleepy as the 25 mg. It does help her neuropathy but does not help with her sleep.                 Current Outpatient Prescriptions:   •  albuterol (PROAIR HFA) 108 (90 BASE) MCG/ACT inhaler, Inhale 2 puffs Every 4 (Four) Hours As Needed for Wheezing., Disp: 1 inhaler, Rfl: 1  •  amitriptyline (ELAVIL) 25 MG tablet, Take 1 tablet by mouth Every Night. Take 1-2 tablets at night as needed., Disp: 30 tablet, Rfl: 5  •  amLODIPine (NORVASC) 10 MG tablet, Take 1 tablet by mouth Daily., Disp: 30 tablet, Rfl: 11  •  cyclobenzaprine (FLEXERIL) 10 MG tablet, Take 1 tablet by mouth 3 (Three) Times a Day As Needed for Muscle Spasms., Disp: 30 tablet, Rfl: 0  •  folic acid (FOLVITE) 1 MG tablet, , Disp: , Rfl: 4  •  linaclotide (LINZESS) 145 MCG capsule capsule, Take 1 " "capsule by mouth Every Morning Before Breakfast., Disp: 30 capsule, Rfl: 5  •  methotrexate 2.5 MG tablet, TAKE 6 TABLETS WEEKLY. (TAKE 3 TABLETS IN THE MORNING & 3 TABLETS 12 HRS LATER) REPEAT WEEKLY, Disp: , Rfl: 4  •  polyethylene glycol (MIRALAX) packet, Take 17 g by mouth Daily., Disp: 30 each, Rfl: 3     PMFSH  The following portions of the patient's history were reviewed and updated as appropriate: allergies, current medications, past family history, past medical history, past social history, past surgical history and problem list.    Review of Systems   Constitutional: Negative for appetite change, fever and unexpected weight change.   HENT: Negative for ear pain, facial swelling and sore throat.    Eyes: Negative for pain and visual disturbance.   Respiratory: Negative for chest tightness, shortness of breath and wheezing.    Cardiovascular: Negative for chest pain and palpitations.   Gastrointestinal: Negative for abdominal pain and blood in stool.   Endocrine: Negative.    Genitourinary: Negative for difficulty urinating and hematuria.   Musculoskeletal: Negative for joint swelling.   Neurological: Negative for tremors, seizures and syncope.   Hematological: Negative for adenopathy.   Psychiatric/Behavioral: Negative.        Objective   /78   Pulse 86   Ht 160.9 cm (63.35\")   Wt 82.6 kg (182 lb)   SpO2 94%   BMI 31.88 kg/m²     Physical Exam   Constitutional: She is oriented to person, place, and time. She appears well-developed and well-nourished. No distress.   HENT:   Head: Normocephalic and atraumatic. Hair is normal.   Right Ear: Hearing, tympanic membrane, external ear and ear canal normal. No drainage. No decreased hearing is noted.   Left Ear: Hearing, tympanic membrane, external ear and ear canal normal. No decreased hearing is noted.   Nose: No nasal deformity.   Mouth/Throat: Oropharynx is clear and moist.   Eyes: Pupils are equal, round, and reactive to light. Conjunctivae, EOM and " lids are normal. Lids are everted and swept, no foreign bodies found. Right eye exhibits no discharge. Left eye exhibits no discharge.   Fundoscopic exam:       The right eye shows red reflex.        The left eye shows red reflex.   Neck: Normal range of motion. Neck supple. No JVD present. No tracheal deviation present. No thyromegaly present.   Cardiovascular: Normal rate, regular rhythm, normal heart sounds, intact distal pulses and normal pulses.  Exam reveals no gallop and no friction rub.    No murmur heard.  Pulmonary/Chest: Effort normal and breath sounds normal. No respiratory distress. She has no wheezes. She has no rales. She exhibits no tenderness. Right breast exhibits no inverted nipple, no mass, no nipple discharge, no skin change and no tenderness. Left breast exhibits no inverted nipple, no mass, no nipple discharge, no skin change and no tenderness.   Abdominal: Soft. Bowel sounds are normal. She exhibits no distension and no mass. There is no tenderness. There is no rebound and no guarding. No hernia.   Musculoskeletal: Normal range of motion. She exhibits no edema, tenderness or deformity.   Lymphadenopathy:     She has no cervical adenopathy.        Right: No inguinal adenopathy present.        Left: No inguinal adenopathy present.   Neurological: She is alert and oriented to person, place, and time. She has normal reflexes. She displays normal reflexes. No cranial nerve deficit. She exhibits normal muscle tone. Coordination normal.   Skin: Skin is warm and dry. No rash noted. She is not diaphoretic. No erythema.   Psychiatric: She has a normal mood and affect. Her behavior is normal. Judgment and thought content normal.   Nursing note and vitals reviewed.      Results for orders placed or performed in visit on 10/16/18   Comprehensive Metabolic Panel   Result Value Ref Range    Glucose 77 70 - 100 mg/dL    BUN 12 9 - 23 mg/dL    Creatinine 0.92 0.60 - 1.30 mg/dL    Sodium 137 132 - 146 mmol/L     Potassium 4.2 3.5 - 5.5 mmol/L    Chloride 103 99 - 109 mmol/L    CO2 30.0 20.0 - 31.0 mmol/L    Calcium 9.3 8.7 - 10.4 mg/dL    Total Protein 6.7 5.7 - 8.2 g/dL    Albumin 4.31 3.20 - 4.80 g/dL    ALT (SGPT) 23 7 - 40 U/L    AST (SGOT) 27 0 - 33 U/L    Alkaline Phosphatase 135 (H) 25 - 100 U/L    Total Bilirubin 0.3 0.3 - 1.2 mg/dL    eGFR  African Amer 75 >60 mL/min/1.73    Globulin 2.4 gm/dL    A/G Ratio 1.8 1.5 - 2.5 g/dL    BUN/Creatinine Ratio 13.0 7.0 - 25.0    Anion Gap 4.0 3.0 - 11.0 mmol/L   Lipid Panel   Result Value Ref Range    Total Cholesterol 196 0 - 200 mg/dL    Triglycerides 85 0 - 150 mg/dL    HDL Cholesterol 57 40 - 60 mg/dL    LDL Cholesterol  128 0 - 130 mg/dL   TSH   Result Value Ref Range    TSH 2.316 0.350 - 5.350 mIU/mL   Vitamin D 25 Hydroxy   Result Value Ref Range    25 Hydroxy, Vitamin D 27.0 ng/ml   CBC Auto Differential   Result Value Ref Range    WBC 3.66 3.50 - 10.80 10*3/mm3    RBC 4.70 3.89 - 5.14 10*6/mm3    Hemoglobin 13.9 11.5 - 15.5 g/dL    Hematocrit 44.0 34.5 - 44.0 %    MCV 93.6 80.0 - 99.0 fL    MCH 29.6 27.0 - 31.0 pg    MCHC 31.6 (L) 32.0 - 36.0 g/dL    RDW 12.2 11.3 - 14.5 %    RDW-SD 42.1 37.0 - 54.0 fl    MPV 10.4 6.0 - 12.0 fL    Platelets 260 150 - 450 10*3/mm3    Neutrophil % 46.1 41.0 - 71.0 %    Lymphocyte % 38.0 24.0 - 44.0 %    Monocyte % 13.1 (H) 0.0 - 12.0 %    Eosinophil % 2.5 0.0 - 3.0 %    Basophil % 0.3 0.0 - 1.0 %    Immature Grans % 0.0 0.0 - 0.6 %    Neutrophils, Absolute 1.69 1.50 - 8.30 10*3/mm3    Lymphocytes, Absolute 1.39 0.60 - 4.80 10*3/mm3    Monocytes, Absolute 0.48 0.00 - 1.00 10*3/mm3    Eosinophils, Absolute 0.09 0.00 - 0.30 10*3/mm3    Basophils, Absolute 0.01 0.00 - 0.20 10*3/mm3    Immature Grans, Absolute 0.00 0.00 - 0.03 10*3/mm3        ASSESSMENT/PLAN    Problem List Items Addressed This Visit        Nervous and Auditory    Neuropathy    Relevant Medications    amitriptyline (ELAVIL) 25 MG tablet       Other    Insomnia      Increase amitriptyline to 25 mg qhs.         Health care maintenance - Primary     Immunizations: declines influenza vaccine; recommended shingrix vaccine; tetanus records requested  Eye exam: recommended  Pap Smear: no longer needed d/t hysterectomy  Mammogram: ordered  Dexa: ordered  Colonoscopy: records requested, pt feels she is overdue  Labs: fasting labs ordered             Relevant Orders    CBC & Differential (Completed)    Comprehensive Metabolic Panel (Completed)    Lipid Panel (Completed)    TSH (Completed)    Vitamin D 25 Hydroxy (Completed)    CBC Auto Differential (Completed)      Other Visit Diagnoses     Colon cancer screening        Relevant Orders    Ambulatory Referral For Screening Colonoscopy    Post-menopausal        Relevant Orders    DEXA Bone Density Axial               Return in about 1 year (around 10/16/2019) for Annual.

## 2018-10-17 PROBLEM — Z00.00 HEALTH CARE MAINTENANCE: Status: ACTIVE | Noted: 2018-10-17

## 2018-10-18 ENCOUNTER — TELEPHONE (OUTPATIENT)
Dept: INTERNAL MEDICINE | Facility: CLINIC | Age: 58
End: 2018-10-18

## 2018-10-18 NOTE — TELEPHONE ENCOUNTER
----- Message from JONE Sultana sent at 10/18/2018 11:43 AM EDT -----  Thank you! Please let her know that she has not had TDaP and will need to come in to get it or get it at pharmacy. Thanks    ----- Message -----  From: Clover Cruz MA  Sent: 10/17/2018   8:06 AM  To: JONE Sultana    Colonnoscopy report requested and will place in your folder once received it was done in 2012, per [facility], [facility] rep, pt only had a TB skin test done but no record of immunizations.    ----- Message -----  From: Cindy Rosa PA  Sent: 10/16/2018   3:28 PM  To: Clover BOSTON MA    Please request vaccination report from [facility] and colonoscopy report from [facility].

## 2018-10-18 NOTE — ASSESSMENT & PLAN NOTE
Immunizations: declines influenza vaccine; recommended shingrix vaccine; tetanus records requested  Eye exam: recommended  Pap Smear: no longer needed d/t hysterectomy  Mammogram: ordered  Dexa: ordered  Colonoscopy: records requested, pt feels she is overdue  Labs: fasting labs ordered

## 2018-11-17 DIAGNOSIS — G62.9 NEUROPATHY: ICD-10-CM

## 2018-11-17 RX ORDER — AMITRIPTYLINE HYDROCHLORIDE 10 MG/1
TABLET, FILM COATED ORAL
Qty: 30 TABLET | Refills: 3 | OUTPATIENT
Start: 2018-11-17

## 2019-01-22 ENCOUNTER — OFFICE VISIT (OUTPATIENT)
Dept: INTERNAL MEDICINE | Facility: CLINIC | Age: 59
End: 2019-01-22

## 2019-01-22 VITALS
WEIGHT: 185 LBS | SYSTOLIC BLOOD PRESSURE: 156 MMHG | DIASTOLIC BLOOD PRESSURE: 80 MMHG | OXYGEN SATURATION: 98 % | TEMPERATURE: 98.2 F | HEIGHT: 63 IN | HEART RATE: 94 BPM | BODY MASS INDEX: 32.78 KG/M2

## 2019-01-22 DIAGNOSIS — J20.9 ACUTE BRONCHITIS, UNSPECIFIED ORGANISM: Primary | ICD-10-CM

## 2019-01-22 PROCEDURE — 99213 OFFICE O/P EST LOW 20 MIN: CPT | Performed by: PHYSICIAN ASSISTANT

## 2019-01-22 RX ORDER — PREDNISONE 10 MG/1
TABLET ORAL
Qty: 30 TABLET | Refills: 0 | Status: SHIPPED | OUTPATIENT
Start: 2019-01-22 | End: 2019-10-25 | Stop reason: SDUPTHER

## 2019-01-22 NOTE — PROGRESS NOTES
Chief Complaint   Patient presents with   • Cough   • Nasal Congestion   • tightness in chest       Subjective   Diane Denson is a 58 y.o. female.       History of Present Illness     Pt has been sick for about 2 days with nasal congestion, drainage and cough. No fever. She is exposed to sick contacts at work. Gets sick almost every season. Taking some robitussin cough syrup to help loosen up her chest.      Current Outpatient Medications:   •  albuterol (PROAIR HFA) 108 (90 BASE) MCG/ACT inhaler, Inhale 2 puffs Every 4 (Four) Hours As Needed for Wheezing., Disp: 1 inhaler, Rfl: 1  •  amitriptyline (ELAVIL) 25 MG tablet, Take 1 tablet by mouth Every Night. Take 1-2 tablets at night as needed., Disp: 30 tablet, Rfl: 5  •  amLODIPine (NORVASC) 10 MG tablet, Take 1 tablet by mouth Daily., Disp: 30 tablet, Rfl: 11  •  cyclobenzaprine (FLEXERIL) 10 MG tablet, Take 1 tablet by mouth 3 (Three) Times a Day As Needed for Muscle Spasms., Disp: 30 tablet, Rfl: 0  •  polyethylene glycol (MIRALAX) packet, Take 17 g by mouth Daily., Disp: 30 each, Rfl: 3  •  predniSONE (DELTASONE) 10 MG tablet, Take 4 tablets for 3 days, then 3 tablets for 3 days, then 2 tablets for 3 days, then 1 tablet for 3 days, Disp: 30 tablet, Rfl: 0     PMFSH  The following portions of the patient's history were reviewed and updated as appropriate: allergies, current medications, past family history, past medical history, past social history, past surgical history and problem list.    Review of Systems   Constitutional: Positive for fatigue. Negative for activity change and unexpected weight change.   HENT: Positive for congestion, postnasal drip and sore throat. Negative for ear pain.    Eyes: Negative for pain and discharge.   Respiratory: Positive for cough. Negative for chest tightness, shortness of breath and wheezing.    Cardiovascular: Negative for chest pain and palpitations.   Gastrointestinal: Negative for abdominal pain, diarrhea  "and vomiting.   Endocrine: Negative.    Genitourinary: Negative.    Musculoskeletal: Negative for joint swelling.   Skin: Negative for color change, rash and wound.   Allergic/Immunologic: Negative.    Neurological: Negative for seizures and syncope.   Psychiatric/Behavioral: Negative.        Objective   /80   Pulse 94   Temp 98.2 °F (36.8 °C)   Ht 160.9 cm (63.35\")   Wt 83.9 kg (185 lb)   SpO2 98%   BMI 32.41 kg/m²     Physical Exam   Constitutional: She is oriented to person, place, and time. She appears well-developed and well-nourished.  Non-toxic appearance. No distress.   HENT:   Head: Normocephalic and atraumatic. Hair is normal.   Right Ear: External ear normal. No drainage, swelling or tenderness. Tympanic membrane is retracted.   Left Ear: External ear normal. No drainage, swelling or tenderness. Tympanic membrane is retracted.   Nose: Mucosal edema present. No epistaxis.   Mouth/Throat: Uvula is midline and mucous membranes are normal. No oral lesions. No uvula swelling. Posterior oropharyngeal erythema present. No oropharyngeal exudate.   Eyes: Conjunctivae and EOM are normal. Pupils are equal, round, and reactive to light. Right eye exhibits no discharge. Left eye exhibits no discharge. No scleral icterus.   Neck: Normal range of motion. Neck supple.   Cardiovascular: Normal rate, regular rhythm and normal heart sounds. Exam reveals no gallop.   No murmur heard.  Pulmonary/Chest: Breath sounds normal. No stridor. No respiratory distress. She has no wheezes. She has no rales. She exhibits no tenderness.   Abdominal: Soft. There is no tenderness.   Lymphadenopathy:     She has cervical adenopathy.   Neurological: She is alert and oriented to person, place, and time. She exhibits normal muscle tone.   Skin: Skin is warm and dry. No rash noted. She is not diaphoretic.   Psychiatric: She has a normal mood and affect. Her behavior is normal. Judgment and thought content normal.   Nursing note and " vitals reviewed.           ASSESSMENT/PLAN    Problem List Items Addressed This Visit        Respiratory    Acute bronchitis - Primary     Start mucinex as directed, increase rest and fluids. Start prednisone taper as directed. RTC if worsening or no improvement.         Relevant Medications    predniSONE (DELTASONE) 10 MG tablet               Return if symptoms worsen or fail to improve, for Next scheduled follow up.

## 2019-01-24 NOTE — ASSESSMENT & PLAN NOTE
Start mucinex as directed, increase rest and fluids. Start prednisone taper as directed. RTC if worsening or no improvement.

## 2019-06-04 DIAGNOSIS — G62.9 NEUROPATHY: ICD-10-CM

## 2019-06-07 RX ORDER — AMITRIPTYLINE HYDROCHLORIDE 25 MG/1
25 TABLET, FILM COATED ORAL NIGHTLY
Qty: 30 TABLET | Refills: 3 | Status: SHIPPED | OUTPATIENT
Start: 2019-06-07 | End: 2019-10-25 | Stop reason: SDUPTHER

## 2019-07-11 DIAGNOSIS — I10 ESSENTIAL HYPERTENSION: ICD-10-CM

## 2019-07-11 RX ORDER — AMLODIPINE BESYLATE 10 MG/1
TABLET ORAL
Qty: 30 TABLET | Refills: 3 | Status: SHIPPED | OUTPATIENT
Start: 2019-07-11 | End: 2020-03-19

## 2019-08-13 ENCOUNTER — OFFICE VISIT (OUTPATIENT)
Dept: INTERNAL MEDICINE | Facility: CLINIC | Age: 59
End: 2019-08-13

## 2019-08-13 VITALS
WEIGHT: 195 LBS | HEART RATE: 72 BPM | TEMPERATURE: 97.7 F | DIASTOLIC BLOOD PRESSURE: 88 MMHG | OXYGEN SATURATION: 93 % | HEIGHT: 63 IN | BODY MASS INDEX: 34.55 KG/M2 | SYSTOLIC BLOOD PRESSURE: 140 MMHG

## 2019-08-13 DIAGNOSIS — R30.0 DYSURIA: ICD-10-CM

## 2019-08-13 DIAGNOSIS — N30.00 ACUTE CYSTITIS WITHOUT HEMATURIA: Primary | ICD-10-CM

## 2019-08-13 LAB
BILIRUB BLD-MCNC: NEGATIVE MG/DL
CLARITY, POC: ABNORMAL
COLOR UR: YELLOW
GLUCOSE UR STRIP-MCNC: NEGATIVE MG/DL
KETONES UR QL: NEGATIVE
LEUKOCYTE EST, POC: ABNORMAL
NITRITE UR-MCNC: NEGATIVE MG/ML
PH UR: 6.5 [PH] (ref 5–8)
PROT UR STRIP-MCNC: NEGATIVE MG/DL
RBC # UR STRIP: NEGATIVE /UL
SP GR UR: 1.01 (ref 1–1.03)
UROBILINOGEN UR QL: NORMAL

## 2019-08-13 PROCEDURE — 87086 URINE CULTURE/COLONY COUNT: CPT | Performed by: NURSE PRACTITIONER

## 2019-08-13 PROCEDURE — 81003 URINALYSIS AUTO W/O SCOPE: CPT | Performed by: NURSE PRACTITIONER

## 2019-08-13 PROCEDURE — 99214 OFFICE O/P EST MOD 30 MIN: CPT | Performed by: NURSE PRACTITIONER

## 2019-08-13 RX ORDER — PHENAZOPYRIDINE HYDROCHLORIDE 100 MG/1
100 TABLET, FILM COATED ORAL 3 TIMES DAILY PRN
Qty: 6 TABLET | Refills: 0 | Status: SHIPPED | OUTPATIENT
Start: 2019-08-13 | End: 2019-10-25

## 2019-08-13 RX ORDER — NITROFURANTOIN 25; 75 MG/1; MG/1
100 CAPSULE ORAL EVERY 12 HOURS SCHEDULED
Qty: 10 CAPSULE | Refills: 0 | Status: SHIPPED | OUTPATIENT
Start: 2019-08-13 | End: 2019-08-18

## 2019-08-13 NOTE — PROGRESS NOTES
Chief Complaint   Patient presents with   • Urinary Tract Infection       History of Present Illness    Diane Denson is a 58 y.o. female who presents today for UTI symptoms x 3 days. Endorses dysuria, cloudy urine, urinary frequency and urgency that lasts all day. Denies hematuria, malodorous urine, change in urine color, fevers, chills, abdominal pain, N/V/D, headaches, confusion. Has been taking Azo OTC with minimal relief, no other OTC medications used. Drinking plenty of fluids and denies change in appetite.    [facility]    The following portions of the patient's history were reviewed and updated as appropriate: allergies, current medications, past family history, past medical history, past social history, past surgical history and problem list.     Social History     Tobacco Use   • Smoking status: Former Smoker   • Smokeless tobacco: Never Used   Substance Use Topics   • Alcohol use: No       Past Medical History:   Diagnosis Date   • Arthritis    • Insomnia        Past Surgical History:   Procedure Laterality Date   • HYSTERECTOMY     • SHOULDER SURGERY      Right shoulder   • TUBAL ABDOMINAL LIGATION         No Known Allergies      Current Outpatient Medications:   •  albuterol (PROAIR HFA) 108 (90 BASE) MCG/ACT inhaler, Inhale 2 puffs Every 4 (Four) Hours As Needed for Wheezing., Disp: 1 inhaler, Rfl: 1  •  amitriptyline (ELAVIL) 25 MG tablet, Take 1 tablet by mouth Every Night. Take 1-2 tablets at night as needed., Disp: 30 tablet, Rfl: 3  •  amLODIPine (NORVASC) 10 MG tablet, TAKE 1 TABLET BY MOUTH EVERY DAY, Disp: 30 tablet, Rfl: 3  •  cyclobenzaprine (FLEXERIL) 10 MG tablet, Take 1 tablet by mouth 3 (Three) Times a Day As Needed for Muscle Spasms., Disp: 30 tablet, Rfl: 0  •  polyethylene glycol (MIRALAX) packet, Take 17 g by mouth Daily., Disp: 30 each, Rfl: 3  •  predniSONE (DELTASONE) 10 MG tablet, Take 4 tablets for 3 days, then 3 tablets for 3 days, then 2 tablets for 3 days, then 1 tablet  "for 3 days, Disp: 30 tablet, Rfl: 0  •  nitrofurantoin, macrocrystal-monohydrate, (MACROBID) 100 MG capsule, Take 1 capsule by mouth Every 12 (Twelve) Hours for 5 days., Disp: 10 capsule, Rfl: 0  •  phenazopyridine (PYRIDIUM) 100 MG tablet, Take 1 tablet by mouth 3 (Three) Times a Day As Needed for bladder spasms., Disp: 6 tablet, Rfl: 0    Review of Systems  Review of Systems   Constitutional: Negative for appetite change, chills, diaphoresis, fatigue and fever.   Respiratory: Negative for cough and shortness of breath.    Cardiovascular: Negative for chest pain and palpitations.   Gastrointestinal: Negative for abdominal distention, abdominal pain, diarrhea, nausea and vomiting.   Genitourinary: Positive for dysuria, frequency and urgency. Negative for decreased urine volume, difficulty urinating, flank pain, hematuria, pelvic pain and vaginal pain.   Musculoskeletal: Negative for myalgias.   Skin: Negative for color change and rash.   Neurological: Negative for dizziness and headaches.   Psychiatric/Behavioral: Negative for confusion and sleep disturbance.       Vitals:  Vitals:    08/13/19 0902   BP: 140/88   Pulse: 72   Temp: 97.7 °F (36.5 °C)   TempSrc: Oral   SpO2: 93%   Weight: 88.5 kg (195 lb)   Height: 160.9 cm (63.35\")   PainSc: 0-No pain       Physical Exam  Physical Exam   Constitutional: She is oriented to person, place, and time. Vital signs are normal. She appears well-developed and well-nourished.   Cardiovascular: Normal rate, regular rhythm and normal heart sounds.   Pulmonary/Chest: Effort normal and breath sounds normal. No respiratory distress. She has no decreased breath sounds. She has no wheezes.   Abdominal: Soft. Normal appearance and bowel sounds are normal. There is no tenderness. There is no CVA tenderness.   Neurological: She is alert and oriented to person, place, and time.   Skin: Skin is warm and dry.   Psychiatric: She has a normal mood and affect. Her behavior is normal.   Nursing " note and vitals reviewed.      Labs  Results for orders placed or performed in visit on 08/13/19   POCT urinalysis dipstick, automated   Result Value Ref Range    Color Yellow Yellow, Straw, Dark Yellow, Jessie    Clarity, UA Cloudy (A) Clear    Specific Gravity  1.015 1.005 - 1.030    pH, Urine 6.5 5.0 - 8.0    Leukocytes 500 Sonya/ul (A) Negative    Nitrite, UA Negative Negative    Protein, POC Negative Negative mg/dL    Glucose, UA Negative Negative, 1000 mg/dL (3+) mg/dL    Ketones, UA Negative Negative    Urobilinogen, UA Normal Normal    Bilirubin Negative Negative    Blood, UA Negative Negative       Assessment/Plan  Diane was seen today for urinary tract infection.    Diagnoses and all orders for this visit:    Acute cystitis without hematuria  -     nitrofurantoin, macrocrystal-monohydrate, (MACROBID) 100 MG capsule; Take 1 capsule by mouth Every 12 (Twelve) Hours for 5 days.  -     phenazopyridine (PYRIDIUM) 100 MG tablet; Take 1 tablet by mouth 3 (Three) Times a Day As Needed for bladder spasms.  -     Urine Culture - Urine, Urine, Clean Catch    Dysuria  -     POCT urinalysis dipstick, automated    Will notify of culture results when received and treat accordingly.  Patient started on oral antibiotic therapy at this time due to presenting symptoms and UA results in office.  Advised to increase water intake, avoid excessive caffeine intake, and may take Tylenol or ibuprofen as needed.  Advised to follow-up for new, worsening, or persistent symptoms repeat UA.    Plan of care reviewed with patient at conclusion of today's visit. Patient education was provided regarding diagnosis, management, and prescribed or recommended OTC medications. Patient verbalized understanding and agreement with plan of care.     Follow-Up  Return if symptoms worsen or fail to improve.    Electronically Signed By:  LAUREN Rajan

## 2019-08-14 LAB — BACTERIA SPEC AEROBE CULT: NORMAL

## 2019-10-25 ENCOUNTER — OFFICE VISIT (OUTPATIENT)
Dept: INTERNAL MEDICINE | Facility: CLINIC | Age: 59
End: 2019-10-25

## 2019-10-25 VITALS
DIASTOLIC BLOOD PRESSURE: 90 MMHG | BODY MASS INDEX: 33.4 KG/M2 | WEIGHT: 190.6 LBS | OXYGEN SATURATION: 99 % | SYSTOLIC BLOOD PRESSURE: 160 MMHG | TEMPERATURE: 97.7 F | HEART RATE: 79 BPM

## 2019-10-25 DIAGNOSIS — G62.9 NEUROPATHY: ICD-10-CM

## 2019-10-25 DIAGNOSIS — M25.512 CHRONIC PAIN OF BOTH SHOULDERS: ICD-10-CM

## 2019-10-25 DIAGNOSIS — J20.9 ACUTE BRONCHITIS, UNSPECIFIED ORGANISM: Primary | ICD-10-CM

## 2019-10-25 DIAGNOSIS — G89.29 CHRONIC PAIN OF BOTH SHOULDERS: ICD-10-CM

## 2019-10-25 DIAGNOSIS — M25.511 CHRONIC PAIN OF BOTH SHOULDERS: ICD-10-CM

## 2019-10-25 DIAGNOSIS — R13.19 ESOPHAGEAL DYSPHAGIA: ICD-10-CM

## 2019-10-25 PROCEDURE — 96372 THER/PROPH/DIAG INJ SC/IM: CPT | Performed by: PHYSICIAN ASSISTANT

## 2019-10-25 PROCEDURE — 99214 OFFICE O/P EST MOD 30 MIN: CPT | Performed by: PHYSICIAN ASSISTANT

## 2019-10-25 RX ORDER — OMEPRAZOLE 40 MG/1
40 CAPSULE, DELAYED RELEASE ORAL DAILY
Qty: 30 CAPSULE | Refills: 2 | Status: SHIPPED | OUTPATIENT
Start: 2019-10-25 | End: 2019-11-18

## 2019-10-25 RX ORDER — DOXYCYCLINE 100 MG/1
100 CAPSULE ORAL 2 TIMES DAILY
Qty: 20 CAPSULE | Refills: 0 | Status: SHIPPED | OUTPATIENT
Start: 2019-10-25 | End: 2020-06-09

## 2019-10-25 RX ORDER — ALBUTEROL SULFATE 90 UG/1
2 AEROSOL, METERED RESPIRATORY (INHALATION) EVERY 4 HOURS PRN
Qty: 1 INHALER | Refills: 3 | Status: SHIPPED | OUTPATIENT
Start: 2019-10-25 | End: 2020-12-19 | Stop reason: SDUPTHER

## 2019-10-25 RX ORDER — PREDNISONE 10 MG/1
TABLET ORAL
Qty: 30 TABLET | Refills: 0 | Status: SHIPPED | OUTPATIENT
Start: 2019-10-25 | End: 2020-03-02

## 2019-10-25 RX ORDER — CYCLOBENZAPRINE HCL 10 MG
10 TABLET ORAL 3 TIMES DAILY PRN
Qty: 30 TABLET | Refills: 0 | Status: SHIPPED | OUTPATIENT
Start: 2019-10-25 | End: 2022-09-19

## 2019-10-25 RX ORDER — AMITRIPTYLINE HYDROCHLORIDE 25 MG/1
25 TABLET, FILM COATED ORAL NIGHTLY
Qty: 30 TABLET | Refills: 3 | Status: SHIPPED | OUTPATIENT
Start: 2019-10-25 | End: 2020-03-09 | Stop reason: SDUPTHER

## 2019-10-25 RX ORDER — METHYLPREDNISOLONE ACETATE 40 MG/ML
40 INJECTION, SUSPENSION INTRA-ARTICULAR; INTRALESIONAL; INTRAMUSCULAR; SOFT TISSUE ONCE
Status: COMPLETED | OUTPATIENT
Start: 2019-10-25 | End: 2019-10-25

## 2019-10-25 RX ADMIN — METHYLPREDNISOLONE ACETATE 40 MG: 40 INJECTION, SUSPENSION INTRA-ARTICULAR; INTRALESIONAL; INTRAMUSCULAR; SOFT TISSUE at 15:31

## 2019-10-25 NOTE — PROGRESS NOTES
Chief Complaint   Patient presents with   • Cough     Acute   • Shortness of Breath   • Wheezing       Subjective   Diane Denson is a 59 y.o. female.       History of Present Illness     Pt had URI a couple weeks ago and did improve but this week came back with chest tightness and and congestion, wheezing and shortness of breath. Coughing with talking. No fever, colored sputum.    Pt has had ongoing dysphagia with food and water, was previously recommended to have EGD but has not had it done. She does have reflux but does not take anything for it.        Current Outpatient Medications:   •  albuterol sulfate HFA (PROAIR HFA) 108 (90 Base) MCG/ACT inhaler, Inhale 2 puffs Every 4 (Four) Hours As Needed for Wheezing., Disp: 1 inhaler, Rfl: 3  •  amitriptyline (ELAVIL) 25 MG tablet, Take 1 tablet by mouth Every Night. Take 1-2 tablets at night as needed., Disp: 30 tablet, Rfl: 3  •  amLODIPine (NORVASC) 10 MG tablet, TAKE 1 TABLET BY MOUTH EVERY DAY, Disp: 30 tablet, Rfl: 3  •  cyclobenzaprine (FLEXERIL) 10 MG tablet, Take 1 tablet by mouth 3 (Three) Times a Day As Needed for Muscle Spasms., Disp: 30 tablet, Rfl: 0  •  polyethylene glycol (MIRALAX) packet, Take 17 g by mouth Daily., Disp: 30 each, Rfl: 3  •  doxycycline (MONODOX) 100 MG capsule, Take 1 capsule by mouth 2 (Two) Times a Day., Disp: 20 capsule, Rfl: 0  •  omeprazole (priLOSEC) 40 MG capsule, Take 1 capsule by mouth Daily., Disp: 30 capsule, Rfl: 2  •  predniSONE (DELTASONE) 10 MG tablet, Take 4 tablets for 3 days, then 3 tablets for 3 days, then 2 tablets for 3 days, then 1 tablet for 3 days, Disp: 30 tablet, Rfl: 0     PMFSH  The following portions of the patient's history were reviewed and updated as appropriate: allergies, current medications, past family history, past medical history, past social history, past surgical history and problem list.    Review of Systems   Constitutional: Positive for fatigue. Negative for activity change and  unexpected weight change.   HENT: Positive for congestion, postnasal drip and sore throat. Negative for ear pain.    Eyes: Negative for pain and discharge.   Respiratory: Positive for cough and wheezing. Negative for chest tightness and shortness of breath.    Cardiovascular: Negative for chest pain and palpitations.   Gastrointestinal: Negative for abdominal pain, diarrhea and vomiting.   Endocrine: Negative.    Genitourinary: Negative.    Musculoskeletal: Negative for joint swelling.   Skin: Negative for color change, rash and wound.   Allergic/Immunologic: Negative.    Neurological: Negative for seizures and syncope.   Psychiatric/Behavioral: Negative.        Objective   /90   Pulse 79   Temp 97.7 °F (36.5 °C)   Wt 86.5 kg (190 lb 9.6 oz)   SpO2 99%   BMI 33.40 kg/m²     Physical Exam   Constitutional: She is oriented to person, place, and time. She appears well-developed and well-nourished.  Non-toxic appearance. No distress.   HENT:   Head: Normocephalic and atraumatic. Hair is normal.   Right Ear: External ear normal. No drainage, swelling or tenderness. Tympanic membrane is retracted.   Left Ear: External ear normal. No drainage, swelling or tenderness. Tympanic membrane is retracted.   Nose: Mucosal edema present. No epistaxis.   Mouth/Throat: Uvula is midline and mucous membranes are normal. No oral lesions. No uvula swelling. Posterior oropharyngeal erythema present. No oropharyngeal exudate.   Eyes: Conjunctivae and EOM are normal. Pupils are equal, round, and reactive to light. Right eye exhibits no discharge. Left eye exhibits no discharge. No scleral icterus.   Neck: Normal range of motion. Neck supple.   Cardiovascular: Normal rate, regular rhythm and normal heart sounds. Exam reveals no gallop.   No murmur heard.  Pulmonary/Chest: Breath sounds normal. No stridor. No respiratory distress. She has no wheezes. She has no rales. She exhibits no tenderness.   Cough with deep breaths    Abdominal: Soft. There is no tenderness.   Lymphadenopathy:     She has cervical adenopathy.   Neurological: She is alert and oriented to person, place, and time. She exhibits normal muscle tone.   Skin: Skin is warm and dry. No rash noted. She is not diaphoretic.   Psychiatric: She has a normal mood and affect. Her behavior is normal. Judgment and thought content normal.   Nursing note and vitals reviewed.           ASSESSMENT/PLAN    Problem List Items Addressed This Visit        Respiratory    Acute bronchitis - Primary     Duoneb in office- improvement in symptoms. Depomedrol 40 mg IM in office. Start prednisone taper and doxycycline as directed. Gave pt sample of symbicort 160/4.5 to start using 2 puffs BID. Use albuterol inhaler prn. RTC if worsening or no improvement.         Relevant Medications    albuterol sulfate HFA (PROAIR HFA) 108 (90 Base) MCG/ACT inhaler    methylPREDNISolone acetate (DEPO-medrol) injection 40 mg (Completed)    predniSONE (DELTASONE) 10 MG tablet    doxycycline (MONODOX) 100 MG capsule       Nervous and Auditory    Neuropathy    Relevant Medications    amitriptyline (ELAVIL) 25 MG tablet      Other Visit Diagnoses     Chronic pain of both shoulders        Relevant Medications    cyclobenzaprine (FLEXERIL) 10 MG tablet    Esophageal dysphagia        Start omeprazole and refer for EGD.    Relevant Medications    omeprazole (priLOSEC) 40 MG capsule    Other Relevant Orders    Ambulatory referral for Screening EGD               Return for Annual.

## 2019-10-28 ENCOUNTER — TELEPHONE (OUTPATIENT)
Dept: INTERNAL MEDICINE | Facility: CLINIC | Age: 59
End: 2019-10-28

## 2019-10-28 NOTE — TELEPHONE ENCOUNTER
PATIENT IS CALLING TO ASK FOR A NOTE TO TAKE ANOTHER 3 DAYS OFF WORK. SHE WOULD LIKE A NOTE THAT HAS HER GOING BACK TO WORK ON Thursday THIS WEEK. PLEASE CALL PATIENT AND LET HER KNOW.  SHE CAN BE REACHED [phone].

## 2019-10-28 NOTE — ASSESSMENT & PLAN NOTE
Duoneb in office- improvement in symptoms. Depomedrol 40 mg IM in office. Start prednisone taper and doxycycline as directed. Gave pt sample of symbicort 160/4.5 to start using 2 puffs BID. Use albuterol inhaler prn. RTC if worsening or no improvement.

## 2019-11-13 ENCOUNTER — LAB REQUISITION (OUTPATIENT)
Dept: LAB | Facility: HOSPITAL | Age: 59
End: 2019-11-13

## 2019-11-13 ENCOUNTER — OUTSIDE FACILITY SERVICE (OUTPATIENT)
Dept: GASTROENTEROLOGY | Facility: CLINIC | Age: 59
End: 2019-11-13

## 2019-11-13 DIAGNOSIS — K21.9 GASTRO-ESOPHAGEAL REFLUX DISEASE WITHOUT ESOPHAGITIS: ICD-10-CM

## 2019-11-13 DIAGNOSIS — R13.14 DYSPHAGIA, PHARYNGOESOPHAGEAL PHASE: ICD-10-CM

## 2019-11-13 DIAGNOSIS — R12 HEARTBURN: ICD-10-CM

## 2019-11-13 PROCEDURE — 43239 EGD BIOPSY SINGLE/MULTIPLE: CPT | Performed by: INTERNAL MEDICINE

## 2019-11-13 PROCEDURE — 43249 ESOPH EGD DILATION <30 MM: CPT | Performed by: INTERNAL MEDICINE

## 2019-11-13 PROCEDURE — 88305 TISSUE EXAM BY PATHOLOGIST: CPT | Performed by: INTERNAL MEDICINE

## 2019-11-14 LAB
CYTO UR: NORMAL
LAB AP CASE REPORT: NORMAL
LAB AP CLINICAL INFORMATION: NORMAL
PATH REPORT.FINAL DX SPEC: NORMAL
PATH REPORT.GROSS SPEC: NORMAL

## 2019-11-18 ENCOUNTER — TELEPHONE (OUTPATIENT)
Dept: GASTROENTEROLOGY | Facility: CLINIC | Age: 59
End: 2019-11-18

## 2019-11-18 DIAGNOSIS — R13.19 ESOPHAGEAL DYSPHAGIA: ICD-10-CM

## 2019-11-18 DIAGNOSIS — K22.10 ULCERATIVE ESOPHAGITIS: Primary | ICD-10-CM

## 2019-11-18 RX ORDER — OMEPRAZOLE 40 MG/1
CAPSULE, DELAYED RELEASE ORAL
Qty: 60 CAPSULE | Refills: 3 | Status: SHIPPED | OUTPATIENT
Start: 2019-11-18 | End: 2022-09-19 | Stop reason: SDUPTHER

## 2019-11-18 NOTE — TELEPHONE ENCOUNTER
Discussed results of pathology with Mrs. Mark.  There was evidence of ulcerative esophagitis.  Will change the proton pump inhibitor twice daily.

## 2019-11-21 ENCOUNTER — TELEPHONE (OUTPATIENT)
Dept: GASTROENTEROLOGY | Facility: CLINIC | Age: 59
End: 2019-11-21

## 2020-03-02 ENCOUNTER — OFFICE VISIT (OUTPATIENT)
Dept: INTERNAL MEDICINE | Facility: CLINIC | Age: 60
End: 2020-03-02

## 2020-03-02 ENCOUNTER — APPOINTMENT (OUTPATIENT)
Dept: LAB | Facility: HOSPITAL | Age: 60
End: 2020-03-02

## 2020-03-02 VITALS
BODY MASS INDEX: 34.73 KG/M2 | SYSTOLIC BLOOD PRESSURE: 140 MMHG | HEART RATE: 83 BPM | WEIGHT: 196 LBS | OXYGEN SATURATION: 99 % | HEIGHT: 63 IN | DIASTOLIC BLOOD PRESSURE: 110 MMHG

## 2020-03-02 DIAGNOSIS — Z00.00 HEALTH CARE MAINTENANCE: ICD-10-CM

## 2020-03-02 DIAGNOSIS — Z12.11 COLON CANCER SCREENING: ICD-10-CM

## 2020-03-02 DIAGNOSIS — K63.5 POLYP OF COLON, UNSPECIFIED PART OF COLON, UNSPECIFIED TYPE: ICD-10-CM

## 2020-03-02 DIAGNOSIS — Z78.0 POST-MENOPAUSAL: ICD-10-CM

## 2020-03-02 DIAGNOSIS — Z23 NEED FOR TDAP VACCINATION: ICD-10-CM

## 2020-03-02 DIAGNOSIS — Z12.39 BREAST CANCER SCREENING: Primary | ICD-10-CM

## 2020-03-02 DIAGNOSIS — I10 ESSENTIAL HYPERTENSION: ICD-10-CM

## 2020-03-02 DIAGNOSIS — Z11.59 NEED FOR HEPATITIS C SCREENING TEST: ICD-10-CM

## 2020-03-02 LAB
ALBUMIN SERPL-MCNC: 4.4 G/DL (ref 3.5–5.2)
ALBUMIN/GLOB SERPL: 1.6 G/DL
ALP SERPL-CCNC: 130 U/L (ref 39–117)
ALT SERPL W P-5'-P-CCNC: 17 U/L (ref 1–33)
ANION GAP SERPL CALCULATED.3IONS-SCNC: 12.9 MMOL/L (ref 5–15)
AST SERPL-CCNC: 22 U/L (ref 1–32)
BASOPHILS # BLD AUTO: 0.01 10*3/MM3 (ref 0–0.2)
BASOPHILS NFR BLD AUTO: 0.3 % (ref 0–1.5)
BILIRUB SERPL-MCNC: 0.2 MG/DL (ref 0.2–1.2)
BUN BLD-MCNC: 10 MG/DL (ref 8–23)
BUN/CREAT SERPL: 11.4 (ref 7–25)
CALCIUM SPEC-SCNC: 9.2 MG/DL (ref 8.6–10.5)
CHLORIDE SERPL-SCNC: 96 MMOL/L (ref 98–107)
CHOLEST SERPL-MCNC: 238 MG/DL (ref 0–200)
CO2 SERPL-SCNC: 28.1 MMOL/L (ref 22–29)
CREAT BLD-MCNC: 0.88 MG/DL (ref 0.57–1)
DEPRECATED RDW RBC AUTO: 40.8 FL (ref 37–54)
EOSINOPHIL # BLD AUTO: 0.18 10*3/MM3 (ref 0–0.4)
EOSINOPHIL NFR BLD AUTO: 5.2 % (ref 0.3–6.2)
ERYTHROCYTE [DISTWIDTH] IN BLOOD BY AUTOMATED COUNT: 12.7 % (ref 12.3–15.4)
GFR SERPL CREATININE-BSD FRML MDRD: 79 ML/MIN/1.73
GLOBULIN UR ELPH-MCNC: 2.8 GM/DL
GLUCOSE BLD-MCNC: 78 MG/DL (ref 65–99)
HCT VFR BLD AUTO: 43.2 % (ref 34–46.6)
HCV AB SER DONR QL: NORMAL
HDLC SERPL-MCNC: 58 MG/DL (ref 40–60)
HGB BLD-MCNC: 14.1 G/DL (ref 12–15.9)
IMM GRANULOCYTES # BLD AUTO: 0.01 10*3/MM3 (ref 0–0.05)
IMM GRANULOCYTES NFR BLD AUTO: 0.3 % (ref 0–0.5)
LDLC SERPL CALC-MCNC: 148 MG/DL (ref 0–100)
LDLC/HDLC SERPL: 2.55 {RATIO}
LYMPHOCYTES # BLD AUTO: 1.43 10*3/MM3 (ref 0.7–3.1)
LYMPHOCYTES NFR BLD AUTO: 41.3 % (ref 19.6–45.3)
MCH RBC QN AUTO: 29 PG (ref 26.6–33)
MCHC RBC AUTO-ENTMCNC: 32.6 G/DL (ref 31.5–35.7)
MCV RBC AUTO: 88.9 FL (ref 79–97)
MONOCYTES # BLD AUTO: 0.42 10*3/MM3 (ref 0.1–0.9)
MONOCYTES NFR BLD AUTO: 12.1 % (ref 5–12)
NEUTROPHILS # BLD AUTO: 1.41 10*3/MM3 (ref 1.7–7)
NEUTROPHILS NFR BLD AUTO: 40.8 % (ref 42.7–76)
NRBC BLD AUTO-RTO: 0 /100 WBC (ref 0–0.2)
PLATELET # BLD AUTO: 294 10*3/MM3 (ref 140–450)
PMV BLD AUTO: 10.2 FL (ref 6–12)
POTASSIUM BLD-SCNC: 3.7 MMOL/L (ref 3.5–5.2)
PROT SERPL-MCNC: 7.2 G/DL (ref 6–8.5)
RBC # BLD AUTO: 4.86 10*6/MM3 (ref 3.77–5.28)
SODIUM BLD-SCNC: 137 MMOL/L (ref 136–145)
TRIGL SERPL-MCNC: 161 MG/DL (ref 0–150)
TSH SERPL DL<=0.05 MIU/L-ACNC: 3.55 UIU/ML (ref 0.27–4.2)
VLDLC SERPL-MCNC: 32.2 MG/DL (ref 5–40)
WBC NRBC COR # BLD: 3.46 10*3/MM3 (ref 3.4–10.8)

## 2020-03-02 PROCEDURE — 80053 COMPREHEN METABOLIC PANEL: CPT | Performed by: PHYSICIAN ASSISTANT

## 2020-03-02 PROCEDURE — 90715 TDAP VACCINE 7 YRS/> IM: CPT | Performed by: PHYSICIAN ASSISTANT

## 2020-03-02 PROCEDURE — 85025 COMPLETE CBC W/AUTO DIFF WBC: CPT | Performed by: PHYSICIAN ASSISTANT

## 2020-03-02 PROCEDURE — 99213 OFFICE O/P EST LOW 20 MIN: CPT | Performed by: PHYSICIAN ASSISTANT

## 2020-03-02 PROCEDURE — 86803 HEPATITIS C AB TEST: CPT | Performed by: PHYSICIAN ASSISTANT

## 2020-03-02 PROCEDURE — 84443 ASSAY THYROID STIM HORMONE: CPT | Performed by: PHYSICIAN ASSISTANT

## 2020-03-02 PROCEDURE — 80061 LIPID PANEL: CPT | Performed by: PHYSICIAN ASSISTANT

## 2020-03-02 PROCEDURE — 90471 IMMUNIZATION ADMIN: CPT | Performed by: PHYSICIAN ASSISTANT

## 2020-03-02 PROCEDURE — 99396 PREV VISIT EST AGE 40-64: CPT | Performed by: PHYSICIAN ASSISTANT

## 2020-03-02 RX ORDER — LOSARTAN POTASSIUM AND HYDROCHLOROTHIAZIDE 12.5; 5 MG/1; MG/1
1 TABLET ORAL DAILY
Qty: 30 TABLET | Refills: 3 | Status: SHIPPED | OUTPATIENT
Start: 2020-03-02 | End: 2020-06-09 | Stop reason: SDUPTHER

## 2020-03-02 NOTE — ASSESSMENT & PLAN NOTE
Immunizations: declines influenza vaccine; recommended shingrix vaccine; TDaP done today  Eye exam: recommended  Pap Smear: no longer needed d/t hysterectomy  Mammogram: ordered  Dexa: ordered  Colonoscopy: ordered  Labs: fasting labs ordered    Counseled patient regarding multimodal approach with healthy nutrition, healthy sleep, regular physical activity, social activities, counseling, safety measures and medications.

## 2020-03-02 NOTE — PROGRESS NOTES
"Chief Complaint   Patient presents with   • Annual Exam       Subjective   Diane Denson is a 59 y.o. female.       History of Present Illness     The patient is being seen for a health maintenance evaluation.  The last health maintenance was 1 year(s) ago.    Social History  Diane  does not smoke cigarettes.   She drinks no alcohol.  She does not use illicit drugs.    General History  Diane  does have regular dental visits.  She does complain of vision problems. Wears reading glasses. Last eye exam was unknown.  Immunizations are not up to date. The patient needs the following immunizations: TDaP needed; declines influenza; shingrix recommended    Lifestyle  Diane  consumes in general, an \"unhealthy\" diet.  She exercises three times a week.    Reproductive Health  Diane  is postmenopausal.  She reports periods are nonexistent due to hysterectomy.  She is sexually active. Her contraceptive plan is no method.    Screening  Last pap was years ago by Dr Escalante, no longer needed due to hysterectomy. History of abnormal pap smear or family history of gyn cancer: no family history  Last mammogram was  2018. Personal or family history of abnormal mammograms or breast cancer: personal history of abnormal mammogram in early 20s  Last colonoscopy was 2012 by [facility], had polyps, needs repeat. Family history of colon cancer: none  Last DEXA was never.                      Current Outpatient Medications:   •  albuterol sulfate HFA (PROAIR HFA) 108 (90 Base) MCG/ACT inhaler, Inhale 2 puffs Every 4 (Four) Hours As Needed for Wheezing., Disp: 1 inhaler, Rfl: 3  •  amitriptyline (ELAVIL) 25 MG tablet, Take 1 tablet by mouth Every Night. Take 1-2 tablets at night as needed., Disp: 30 tablet, Rfl: 3  •  amLODIPine (NORVASC) 10 MG tablet, TAKE 1 TABLET BY MOUTH EVERY DAY, Disp: 30 tablet, Rfl: 3  •  cyclobenzaprine (FLEXERIL) 10 MG tablet, Take 1 tablet by mouth 3 (Three) Times a Day As Needed for Muscle Spasms., " "Disp: 30 tablet, Rfl: 0  •  doxycycline (MONODOX) 100 MG capsule, Take 1 capsule by mouth 2 (Two) Times a Day., Disp: 20 capsule, Rfl: 0  •  omeprazole (priLOSEC) 40 MG capsule, 1 capsule by mouth twice daily, Disp: 60 capsule, Rfl: 3  •  losartan-hydrochlorothiazide (HYZAAR) 50-12.5 MG per tablet, Take 1 tablet by mouth Daily., Disp: 30 tablet, Rfl: 3  •  polyethylene glycol (MIRALAX) packet, Take 17 g by mouth Daily., Disp: 30 each, Rfl: 3     PMFSH  The following portions of the patient's history were reviewed and updated as appropriate: allergies, current medications, past family history, past medical history, past social history, past surgical history and problem list.    Review of Systems   Constitutional: Negative for appetite change, fever and unexpected weight change.   HENT: Negative.  Negative for ear pain, facial swelling and sore throat.    Eyes: Negative for pain and visual disturbance.   Respiratory: Negative for chest tightness, shortness of breath and wheezing.         No nipple discharge or breast mass   Cardiovascular: Negative for chest pain and palpitations.   Gastrointestinal: Negative for abdominal pain and blood in stool.   Endocrine: Negative.    Genitourinary: Negative for difficulty urinating, dyspareunia, dysuria, frequency, hematuria, menstrual problem, pelvic pain, vaginal discharge and vaginal pain.   Musculoskeletal: Negative for joint swelling.   Skin: Negative for color change, rash and wound.   Allergic/Immunologic: Negative.    Neurological: Negative for dizziness, tremors, seizures, syncope, light-headedness and headaches.   Hematological: Negative for adenopathy.   Psychiatric/Behavioral: Negative.    All other systems reviewed and are negative.      Objective   BP (!) 140/110   Pulse 83   Ht 160 cm (63\")   Wt 88.9 kg (196 lb)   SpO2 99%   BMI 34.72 kg/m²     Physical Exam   Constitutional: She is oriented to person, place, and time. She appears well-developed and " well-nourished. No distress.   HENT:   Head: Normocephalic and atraumatic. Hair is normal.   Right Ear: Hearing, tympanic membrane, external ear and ear canal normal. No drainage. No decreased hearing is noted.   Left Ear: Hearing, tympanic membrane, external ear and ear canal normal. No decreased hearing is noted.   Nose: No nasal deformity.   Mouth/Throat: Oropharynx is clear and moist.   Eyes: Pupils are equal, round, and reactive to light. Conjunctivae, EOM and lids are normal. Lids are everted and swept, no foreign bodies found. Right eye exhibits no discharge. Left eye exhibits no discharge.   Fundoscopic exam:       The right eye shows red reflex.        The left eye shows red reflex.   Neck: Normal range of motion. Neck supple. No JVD present. No tracheal deviation present. No thyromegaly present.   Cardiovascular: Normal rate, regular rhythm, normal heart sounds, intact distal pulses and normal pulses. Exam reveals no gallop and no friction rub.   No murmur heard.  Pulmonary/Chest: Effort normal and breath sounds normal. No respiratory distress. She has no wheezes. She has no rales. She exhibits no tenderness.   Pt declined breast exam   Abdominal: Soft. Bowel sounds are normal. She exhibits no distension and no mass. There is no tenderness. There is no rebound and no guarding. No hernia.   Musculoskeletal: Normal range of motion. She exhibits no edema, tenderness or deformity.   Lymphadenopathy:     She has no cervical adenopathy.        Right: No inguinal adenopathy present.        Left: No inguinal adenopathy present.   Neurological: She is alert and oriented to person, place, and time. She has normal reflexes. She displays normal reflexes. No cranial nerve deficit. She exhibits normal muscle tone. Coordination normal.   Skin: Skin is warm and dry. No rash noted. She is not diaphoretic. No erythema.   Psychiatric: She has a normal mood and affect. Her behavior is normal. Judgment and thought content  normal.   Nursing note and vitals reviewed.           ASSESSMENT/PLAN    Problem List Items Addressed This Visit        Cardiovascular and Mediastinum    Hypertension     Hypertension is worsening.  Dietary sodium restriction.  Weight loss.  Regular aerobic exercise.  Medication changes per orders.  Ambulatory blood pressure monitoring. Continue amlodipine 10 mg daily, start losartan/hctz 50/12.5 mg.  Blood pressure will be reassessed in 4 weeks.         Relevant Medications    losartan-hydrochlorothiazide (HYZAAR) 50-12.5 MG per tablet       Other    Health care maintenance     Immunizations: declines influenza vaccine; recommended shingrix vaccine; TDaP done today  Eye exam: recommended  Pap Smear: no longer needed d/t hysterectomy  Mammogram: ordered  Dexa: ordered  Colonoscopy: ordered  Labs: fasting labs ordered    Counseled patient regarding multimodal approach with healthy nutrition, healthy sleep, regular physical activity, social activities, counseling, safety measures and medications.              Relevant Orders    CBC & Differential    Comprehensive Metabolic Panel    Lipid Panel    TSH    Hepatitis C Antibody    CBC Auto Differential      Other Visit Diagnoses     Breast cancer screening    -  Primary    Relevant Orders    Mammo Screening Digital Tomosynthesis Bilateral With CAD    Post-menopausal        Relevant Orders    DEXA Bone Density Axial    Colon cancer screening        Relevant Orders    Ambulatory Referral For Screening Colonoscopy    Polyp of colon, unspecified part of colon, unspecified type        Relevant Orders    Ambulatory Referral For Screening Colonoscopy    Need for hepatitis C screening test        Relevant Orders    Hepatitis C Antibody    Need for Tdap vaccination        Relevant Orders    Tdap Vaccine Greater Than or Equal To 8yo IM (Completed)               Return in about 4 weeks (around 3/30/2020) for Follow up, Annual with fasting labs in 1 year.

## 2020-03-02 NOTE — ASSESSMENT & PLAN NOTE
Hypertension is worsening.  Dietary sodium restriction.  Weight loss.  Regular aerobic exercise.  Medication changes per orders.  Ambulatory blood pressure monitoring. Continue amlodipine 10 mg daily, start losartan/hctz 50/12.5 mg.  Blood pressure will be reassessed in 4 weeks.

## 2020-03-04 RX ORDER — SODIUM, POTASSIUM,MAG SULFATES 17.5-3.13G
1 SOLUTION, RECONSTITUTED, ORAL ORAL TAKE AS DIRECTED
Qty: 2 BOTTLE | Refills: 0 | Status: SHIPPED | OUTPATIENT
Start: 2020-03-04 | End: 2020-11-28

## 2020-03-09 ENCOUNTER — TELEPHONE (OUTPATIENT)
Dept: INTERNAL MEDICINE | Facility: CLINIC | Age: 60
End: 2020-03-09

## 2020-03-09 DIAGNOSIS — G62.9 NEUROPATHY: ICD-10-CM

## 2020-03-09 RX ORDER — AMITRIPTYLINE HYDROCHLORIDE 25 MG/1
25 TABLET, FILM COATED ORAL NIGHTLY
Qty: 180 TABLET | Refills: 0 | Status: SHIPPED | OUTPATIENT
Start: 2020-03-09 | End: 2020-03-09 | Stop reason: SDUPTHER

## 2020-03-09 RX ORDER — AMITRIPTYLINE HYDROCHLORIDE 25 MG/1
TABLET, FILM COATED ORAL
Qty: 180 TABLET | Refills: 0 | Status: SHIPPED | OUTPATIENT
Start: 2020-03-09 | End: 2020-10-27 | Stop reason: SDUPTHER

## 2020-03-09 NOTE — TELEPHONE ENCOUNTER
Krys from [facility] called and they need clarification on Amitriptyline the directions say take 1 tablet by mouth every night and take 1-2 tablets at night as needed.    You can call them at [phone].

## 2020-03-19 DIAGNOSIS — I10 ESSENTIAL HYPERTENSION: ICD-10-CM

## 2020-03-19 RX ORDER — AMLODIPINE BESYLATE 10 MG/1
TABLET ORAL
Qty: 90 TABLET | Refills: 1 | Status: SHIPPED | OUTPATIENT
Start: 2020-03-19 | End: 2020-06-09 | Stop reason: SDUPTHER

## 2020-03-20 ENCOUNTER — TELEPHONE (OUTPATIENT)
Dept: INTERNAL MEDICINE | Facility: CLINIC | Age: 60
End: 2020-03-20

## 2020-03-20 NOTE — TELEPHONE ENCOUNTER
PT STATES THAT SHE NEEDS  WRITTEN NOTE STATING THAT SHE CAN WORK FROM HOME BECAUSE SHE HAS  AN ON GOING CASE OF BRONCHITIS. PT WOULD LIKE THAT FAXED.  ATTN: DENZEL GOLDSTEIN FAX# [phone]    PLEASE ADVISE PT @ [phone]

## 2020-03-23 NOTE — TELEPHONE ENCOUNTER
Is this required by her employer? 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 Please find out the nature and timing of her illness if she still needs a note.

## 2020-03-24 NOTE — TELEPHONE ENCOUNTER
Pt states today that she had been feeling bad over the weekend but is feeling better now and back to work today, advised if she needs a note to give us a call

## 2020-04-02 ENCOUNTER — TELEPHONE (OUTPATIENT)
Dept: INTERNAL MEDICINE | Facility: CLINIC | Age: 60
End: 2020-04-02

## 2020-04-03 ENCOUNTER — APPOINTMENT (OUTPATIENT)
Dept: LAB | Facility: HOSPITAL | Age: 60
End: 2020-04-03

## 2020-04-03 ENCOUNTER — OFFICE VISIT (OUTPATIENT)
Dept: INTERNAL MEDICINE | Facility: CLINIC | Age: 60
End: 2020-04-03

## 2020-04-03 VITALS
OXYGEN SATURATION: 98 % | HEIGHT: 63 IN | TEMPERATURE: 98.1 F | WEIGHT: 195 LBS | SYSTOLIC BLOOD PRESSURE: 132 MMHG | BODY MASS INDEX: 34.55 KG/M2 | RESPIRATION RATE: 16 BRPM | HEART RATE: 83 BPM | DIASTOLIC BLOOD PRESSURE: 84 MMHG

## 2020-04-03 DIAGNOSIS — R30.0 DYSURIA: Primary | ICD-10-CM

## 2020-04-03 DIAGNOSIS — N32.81 OVERACTIVE BLADDER: ICD-10-CM

## 2020-04-03 PROBLEM — K62.1 RECTAL POLYP: Status: ACTIVE | Noted: 2020-04-03

## 2020-04-03 LAB
BILIRUB BLD-MCNC: NEGATIVE MG/DL
CLARITY, POC: CLEAR
COLOR UR: YELLOW
GLUCOSE UR STRIP-MCNC: NEGATIVE MG/DL
KETONES UR QL: NEGATIVE
LEUKOCYTE EST, POC: ABNORMAL
NITRITE UR-MCNC: NEGATIVE MG/ML
PH UR: 6.5 [PH] (ref 5–8)
PROT UR STRIP-MCNC: NEGATIVE MG/DL
RBC # UR STRIP: NEGATIVE /UL
SP GR UR: 1.02 (ref 1–1.03)
UROBILINOGEN UR QL: NORMAL

## 2020-04-03 PROCEDURE — 87086 URINE CULTURE/COLONY COUNT: CPT | Performed by: NURSE PRACTITIONER

## 2020-04-03 PROCEDURE — 99213 OFFICE O/P EST LOW 20 MIN: CPT | Performed by: NURSE PRACTITIONER

## 2020-04-03 PROCEDURE — 81003 URINALYSIS AUTO W/O SCOPE: CPT | Performed by: NURSE PRACTITIONER

## 2020-04-03 RX ORDER — NITROFURANTOIN 25; 75 MG/1; MG/1
100 CAPSULE ORAL 2 TIMES DAILY
Qty: 14 CAPSULE | Refills: 0 | Status: SHIPPED | OUTPATIENT
Start: 2020-04-03 | End: 2020-04-10

## 2020-04-03 RX ORDER — OXYBUTYNIN CHLORIDE 5 MG/1
5 TABLET, EXTENDED RELEASE ORAL DAILY
Qty: 30 TABLET | Refills: 5 | Status: SHIPPED | OUTPATIENT
Start: 2020-04-03 | End: 2022-09-19 | Stop reason: SDUPTHER

## 2020-04-03 NOTE — PATIENT INSTRUCTIONS
Dysuria  Dysuria is pain or discomfort while urinating. The pain or discomfort may be felt in the part of your body that drains urine from the bladder (urethra) or in the surrounding tissue of the genitals. The pain may also be felt in the groin area, lower abdomen, or lower back. You may have to urinate frequently or have the sudden feeling that you have to urinate (urgency). Dysuria can affect both men and women, but it is more common in women.  Dysuria can be caused by many different things, including:  · Urinary tract infection.  · Kidney stones or bladder stones.  · Certain sexually transmitted infections (STIs), such as chlamydia.  · Dehydration.  · Inflammation of the tissues of the vagina.  · Use of certain medicines.  · Use of certain soaps or scented products that cause irritation.  Follow these instructions at home:  General instructions  · Watch your condition for any changes.  · Urinate often. Avoid holding urine for long periods of time.  · After a bowel movement or urination, women should cleanse from front to back, using each tissue only once.  · Urinate after sexual intercourse.  · Keep all follow-up visits as told by your health care provider. This is important.  · If you had any tests done to find the cause of dysuria, it is up to you to get your test results. Ask your health care provider, or the department that is doing the test, when your results will be ready.  Eating and drinking    · Drink enough fluid to keep your urine pale yellow.  · Avoid caffeine, tea, and alcohol. They can irritate the bladder and make dysuria worse. In men, alcohol may irritate the prostate.  Medicines  · Take over-the-counter and prescription medicines only as told by your health care provider.  · If you were prescribed an antibiotic medicine, take it as told by your health care provider. Do not stop taking the antibiotic even if you start to feel better.  Contact a health care provider if:  · You have a  fever.  · You develop pain in your back or sides.  · You have nausea or vomiting.  · You have blood in your urine.  · You are not urinating as often as you usually do.  Get help right away if:  · Your pain is severe and not relieved with medicines.  · You cannot eat or drink without vomiting.  · You are confused.  · You have a rapid heartbeat while at rest.  · You have shaking or chills.  · You feel extremely weak.  Summary  · Dysuria is pain or discomfort while urinating. Many different conditions can lead to dysuria.  · If you have dysuria, you may have to urinate frequently or have the sudden feeling that you have to urinate (urgency).  · Watch your condition for any changes. Keep all follow-up visits as told by your health care provider.  · Make sure that you urinate often and drink enough fluid to keep your urine pale yellow.  This information is not intended to replace advice given to you by your health care provider. Make sure you discuss any questions you have with your health care provider.  Document Released: 09/15/2005 Document Revised: 10/04/2018 Document Reviewed: 10/04/2018  [employer] Interactive Patient Education © 2020 [employer]    Overactive Bladder, Adult    Overactive bladder refers to a condition in which a person has a sudden need to pass urine. The person may leak urine if he or she cannot get to the bathroom fast enough (urinary incontinence). A person with this condition may also wake up several times in the night to go to the bathroom.  Overactive bladder is associated with poor nerve signals between your bladder and your brain. Your bladder may get the signal to empty before it is full. You may also have very sensitive muscles that make your bladder squeeze too soon. These symptoms might interfere with daily work or social activities.  What are the causes?  This condition may be associated with or caused by:  · Urinary tract infection.  · Infection of nearby tissues, such as the  prostate.  · Prostate enlargement.  · Surgery on the uterus or urethra.  · Bladder stones, inflammation, or tumors.  · Drinking too much caffeine or alcohol.  · Certain medicines, especially medicines that get rid of extra fluid in the body (diuretics).  · Muscle or nerve weakness, especially from:  ? A spinal cord injury.  ? Stroke.  ? Multiple sclerosis.  ? Parkinson's disease.  · Diabetes.  · Constipation.  What increases the risk?  You may be at greater risk for overactive bladder if you:  · Are an older adult.  · Smoke.  · Are going through menopause.  · Have prostate problems.  · Have a neurological disease, such as stroke, dementia, Parkinson's disease, or multiple sclerosis (MS).  · Eat or drink things that irritate the bladder. These include alcohol, spicy food, and caffeine.  · Are overweight or obese.  What are the signs or symptoms?  Symptoms of this condition include:  · Sudden, strong urge to urinate.  · Leaking urine.  · Urinating 8 or more times a day.  · Waking up to urinate 2 or more times a night.  How is this diagnosed?  Your health care provider may suspect overactive bladder based on your symptoms. He or she will diagnose this condition by:  · A physical exam and medical history.  · Blood or urine tests. You might need bladder or urine tests to help determine what is causing your overactive bladder.  You might also need to see a health care provider who specializes in urinary tract problems (urologist).  How is this treated?  Treatment for overactive bladder depends on the cause of your condition and whether it is mild or severe. You can also make lifestyle changes at home. Options include:  · Bladder training. This may include:  ? Learning to control the urge to urinate by following a schedule that directs you to urinate at regular intervals (timed voiding).  ? Doing Kegel exercises to strengthen your pelvic floor muscles, which support your bladder. Toning these muscles can help you control  urination, even if your bladder muscles are overactive.  · Special devices. This may include:  ? Biofeedback, which uses sensors to help you become aware of your body's signals.  ? Electrical stimulation, which uses electrodes placed inside the body (implanted) or outside the body. These electrodes send gentle pulses of electricity to strengthen the nerves or muscles that control the bladder.  ? Women may use a plastic device that fits into the vagina and supports the bladder (pessary).  · Medicines.  ? Antibiotics to treat bladder infection.  ? Antispasmodics to stop the bladder from releasing urine at the wrong time.  ? Tricyclic antidepressants to relax bladder muscles.  ? Injections of botulinum toxin type A directly into the bladder tissue to relax bladder muscles.  · Lifestyle changes. This may include:  ? Weight loss. Talk to your health care provider about weight loss methods that would work best for you.  ? Diet changes. This may include reducing how much alcohol and caffeine you consume, or drinking fluids at different times of the day.  ? Not smoking. Do not use any products that contain nicotine or tobacco, such as cigarettes and e-cigarettes. If you need help quitting, ask your health care provider.  · Surgery.  ? A device may be implanted to help manage the nerve signals that control urination.  ? An electrode may be implanted to stimulate electrical signals in the bladder.  ? A procedure may be done to change the shape of the bladder. This is done only in very severe cases.  Follow these instructions at home:  Lifestyle  · Make any diet or lifestyle changes that are recommended by your health care provider. These may include:  ? Drinking less fluid or drinking fluids at different times of the day.  ? Cutting down on caffeine or alcohol.  ? Doing Kegel exercises.  ? Losing weight if needed.  ? Eating a healthy and balanced diet to prevent constipation. This may include:  § Eating foods that are high in  fiber, such as fresh fruits and vegetables, whole grains, and beans.  § Limiting foods that are high in fat and processed sugars, such as fried and sweet foods.  General instructions  · Take over-the-counter and prescription medicines only as told by your health care provider.  · If you were prescribed an antibiotic medicine, take it as told by your health care provider. Do not stop taking the antibiotic even if you start to feel better.  · Use any implants or pessary as told by your health care provider.  · If needed, wear pads to absorb urine leakage.  · Keep a journal or log to track how much and when you drink and when you feel the need to urinate. This will help your health care provider monitor your condition.  · Keep all follow-up visits as told by your health care provider. This is important.  Contact a health care provider if:  · You have a fever.  · Your symptoms do not get better with treatment.  · Your pain and discomfort get worse.  · You have more frequent urges to urinate.  Get help right away if:  · You are not able to control your bladder.  Summary  · Overactive bladder refers to a condition in which a person has a sudden need to pass urine.  · Several conditions may lead to an overactive bladder.  · Treatment for overactive bladder depends on the cause and severity of your condition.  · Follow your health care provider's instructions about lifestyle changes, doing Kegel exercises, keeping a journal, and taking medicines.  This information is not intended to replace advice given to you by your health care provider. Make sure you discuss any questions you have with your health care provider.  Document Released: 10/14/2010 Document Revised: 01/03/2019 Document Reviewed: 01/03/2019  [employer] Interactive Patient Education © 2020 [employer]

## 2020-04-03 NOTE — PROGRESS NOTES
Diane Denson is a 59 y.o. female who presents for dysuria.    Chief Complaint   Patient presents with   • Urinary Tract Infection       Urinary Tract Infection    This is a new problem. The current episode started in the past 7 days. The problem occurs intermittently. The problem has been waxing and waning. The quality of the pain is described as burning. The pain is at a severity of 5/10. The pain is moderate. There has been no fever. Associated symptoms include frequency and urgency. Pertinent negatives include no chills, discharge, flank pain, hematuria, nausea or vomiting. She has tried increased fluids and home medications for the symptoms. The treatment provided mild relief. There is no history of recurrent UTIs.         Past Medical History:   Diagnosis Date   • Arthritis    • Insomnia        Past Surgical History:   Procedure Laterality Date   • HYSTERECTOMY     • SHOULDER SURGERY      Right shoulder   • TUBAL ABDOMINAL LIGATION         Family History   Problem Relation Age of Onset   • Hyperlipidemia Mother    • Osteoarthritis Mother    • Hypertension Mother    • Arthritis Father    • Heart failure Father    • Diabetes Father    • Hypertension Father    • Kidney disease Father    • Stroke Father    • Obesity Other        Social History     Socioeconomic History   • Marital status:      Spouse name: Not on file   • Number of children: Not on file   • Years of education: Not on file   • Highest education level: Not on file   Tobacco Use   • Smoking status: Former Smoker   • Smokeless tobacco: Never Used   Substance and Sexual Activity   • Alcohol use: No   • Drug use: No   • Sexual activity: Defer       No Known Allergies    ROS    Review of Systems   Constitutional: Negative for chills and fever.   Respiratory: Negative for cough.    Cardiovascular: Negative for chest pain.   Gastrointestinal: Negative for abdominal pain, nausea and vomiting.   Genitourinary: Positive for dysuria,  "frequency and urgency. Negative for flank pain and hematuria.   Skin: Negative for rash.   Allergic/Immunologic: Negative for environmental allergies and immunocompromised state.   Neurological: Negative for dizziness.   Hematological: Negative for adenopathy.   Psychiatric/Behavioral: The patient is not nervous/anxious.        Vitals:    04/03/20 1024   BP: 132/84   Pulse: 83   Resp: 16   Temp: 98.1 °F (36.7 °C)   SpO2: 98%   Weight: 88.5 kg (195 lb)   Height: 160 cm (63\")   PainSc:   2         Current Outpatient Medications:   •  albuterol sulfate HFA (PROAIR HFA) 108 (90 Base) MCG/ACT inhaler, Inhale 2 puffs Every 4 (Four) Hours As Needed for Wheezing., Disp: 1 inhaler, Rfl: 3  •  amitriptyline (ELAVIL) 25 MG tablet, Take 1-2 tablets at night as needed., Disp: 180 tablet, Rfl: 0  •  amLODIPine (NORVASC) 10 MG tablet, TAKE 1 TABLET BY MOUTH EVERY DAY, Disp: 90 tablet, Rfl: 1  •  cyclobenzaprine (FLEXERIL) 10 MG tablet, Take 1 tablet by mouth 3 (Three) Times a Day As Needed for Muscle Spasms., Disp: 30 tablet, Rfl: 0  •  doxycycline (MONODOX) 100 MG capsule, Take 1 capsule by mouth 2 (Two) Times a Day., Disp: 20 capsule, Rfl: 0  •  losartan-hydrochlorothiazide (HYZAAR) 50-12.5 MG per tablet, Take 1 tablet by mouth Daily., Disp: 30 tablet, Rfl: 3  •  omeprazole (priLOSEC) 40 MG capsule, 1 capsule by mouth twice daily, Disp: 60 capsule, Rfl: 3  •  polyethylene glycol (MIRALAX) packet, Take 17 g by mouth Daily., Disp: 30 each, Rfl: 3  •  sodium-potassium-magnesium sulfates (SUPREP BOWEL PREP KIT) 17.5-3.13-1.6 GM/177ML solution oral solution, Take 1 bottle by mouth Take As Directed. Follow instructions that were mailed to your home. If you didn't receive these call [phone]., Disp: 2 bottle, Rfl: 0  •  nitrofurantoin, macrocrystal-monohydrate, (Macrobid) 100 MG capsule, Take 1 capsule by mouth 2 (Two) Times a Day for 7 days., Disp: 14 capsule, Rfl: 0  •  oxybutynin XL (Ditropan XL) 5 MG 24 hr tablet, Take 1 " tablet by mouth Daily., Disp: 30 tablet, Rfl: 5    PE    Physical Exam   Constitutional: She is oriented to person, place, and time. She appears well-developed and well-nourished. No distress.   HENT:   Head: Normocephalic and atraumatic.   Eyes: Pupils are equal, round, and reactive to light. EOM are normal.   Neck: Normal range of motion. Neck supple.   Cardiovascular: Normal rate, regular rhythm and normal heart sounds. Exam reveals no gallop and no friction rub.   No murmur heard.  Pulmonary/Chest: Effort normal and breath sounds normal.   Abdominal: There is no tenderness. There is no CVA tenderness.   Musculoskeletal: Normal range of motion. She exhibits no tenderness.   Lymphadenopathy:     She has no cervical adenopathy.   Neurological: She is alert and oriented to person, place, and time.   Skin: Skin is warm and dry. Capillary refill takes less than 2 seconds. No rash noted. She is not diaphoretic.   Psychiatric: She has a normal mood and affect. Her behavior is normal. Judgment and thought content normal.   Nursing note and vitals reviewed.       A/P    Problem List Items Addressed This Visit        Genitourinary    Overactive bladder    Relevant Medications    nitrofurantoin, macrocrystal-monohydrate, (Macrobid) 100 MG capsule    oxybutynin XL (Ditropan XL) 5 MG 24 hr tablet      Other Visit Diagnoses     Dysuria    -  Primary    Take antibiotics as prescribed, hydrate well. F/U as needed.    Relevant Medications    nitrofurantoin, macrocrystal-monohydrate, (Macrobid) 100 MG capsule    Other Relevant Orders    POCT urinalysis dipstick, automated (Completed)    Urine Culture - Urine, Urine, Clean Catch          Assessment      ICD-10-CM ICD-9-CM   1. Dysuria R30.0 788.1   2. Overactive bladder N32.81 596.51            Problems Addressed this Visit        Genitourinary    Overactive bladder    Relevant Medications    nitrofurantoin, macrocrystal-monohydrate, (Macrobid) 100 MG capsule    oxybutynin XL  (Ditropan XL) 5 MG 24 hr tablet      Other Visit Diagnoses     Dysuria    -  Primary    Take antibiotics as prescribed, hydrate well. F/U as needed.    Relevant Medications    nitrofurantoin, macrocrystal-monohydrate, (Macrobid) 100 MG capsule    Other Relevant Orders    POCT urinalysis dipstick, automated (Completed)    Urine Culture - Urine, Urine, Clean Catch           Plan    Orders Placed This Encounter   Procedures   • Urine Culture - Urine, Urine, Clean Catch   • POCT urinalysis dipstick, automated     New Medications Ordered This Visit   Medications   • nitrofurantoin, macrocrystal-monohydrate, (Macrobid) 100 MG capsule     Sig: Take 1 capsule by mouth 2 (Two) Times a Day for 7 days.     Dispense:  14 capsule     Refill:  0   • oxybutynin XL (Ditropan XL) 5 MG 24 hr tablet     Sig: Take 1 tablet by mouth Daily.     Dispense:  30 tablet     Refill:  5            Plan of care reviewed with patient at the conclusion of today's visit. Education was provided regarding diagnosis, management and any prescribed or recommended OTC medications.  Patient verbalizes understanding of and agreement with management plan.    Return if symptoms worsen or fail to improve.     LAUREN Villalobos

## 2020-04-04 LAB — BACTERIA SPEC AEROBE CULT: NORMAL

## 2020-04-17 ENCOUNTER — TELEPHONE (OUTPATIENT)
Dept: INTERNAL MEDICINE | Facility: CLINIC | Age: 60
End: 2020-04-17

## 2020-04-17 NOTE — TELEPHONE ENCOUNTER
Need to do a video or telephone visit- unless she is feeling short of breath and wheezing. If so, needs to be seen at the respiratory clinic to get tested for COVID.

## 2020-04-17 NOTE — TELEPHONE ENCOUNTER
PT CALLED AND WOULD LIKE A RX FOR PREDISONE FOR HER BRONCHITIS; PLEASE ADVISE; SHE ALSO WOULD LIKE SOME COUGH SYRUP    [facility]

## 2020-06-09 ENCOUNTER — OFFICE VISIT (OUTPATIENT)
Dept: INTERNAL MEDICINE | Facility: CLINIC | Age: 60
End: 2020-06-09

## 2020-06-09 VITALS
RESPIRATION RATE: 16 BRPM | BODY MASS INDEX: 34.2 KG/M2 | HEIGHT: 63 IN | DIASTOLIC BLOOD PRESSURE: 82 MMHG | WEIGHT: 193 LBS | HEART RATE: 76 BPM | SYSTOLIC BLOOD PRESSURE: 146 MMHG | OXYGEN SATURATION: 98 %

## 2020-06-09 DIAGNOSIS — N32.81 OVERACTIVE BLADDER: Primary | ICD-10-CM

## 2020-06-09 DIAGNOSIS — M79.89 LEG SWELLING: ICD-10-CM

## 2020-06-09 DIAGNOSIS — I10 ESSENTIAL HYPERTENSION: ICD-10-CM

## 2020-06-09 PROCEDURE — 99214 OFFICE O/P EST MOD 30 MIN: CPT | Performed by: PHYSICIAN ASSISTANT

## 2020-06-09 RX ORDER — LOSARTAN POTASSIUM AND HYDROCHLOROTHIAZIDE 12.5; 5 MG/1; MG/1
1 TABLET ORAL DAILY
Qty: 30 TABLET | Refills: 3 | Status: SHIPPED | OUTPATIENT
Start: 2020-06-09 | End: 2020-09-05

## 2020-06-09 RX ORDER — AMLODIPINE BESYLATE 5 MG/1
10 TABLET ORAL DAILY
Qty: 30 TABLET | Refills: 5 | Status: ON HOLD | OUTPATIENT
Start: 2020-06-09 | End: 2020-12-03 | Stop reason: SDUPTHER

## 2020-06-09 NOTE — PROGRESS NOTES
Chief Complaint   Patient presents with   • Foot Swelling     both legs and feet, L is worse than the R       Subjective   Diane Denson is a 59 y.o. female.       History of Present Illness     Pt has had intermittent swelling in her bilateral lower extremities. Worse by the end of the day and better in the morning. Has been ongoing for past few months. Denies shortness of breath or chest pain.    Pt notes increased urination at night. Has been to a Urologist and was treated with heparin and noted to have urethral narrowing. She was also started on ditropan at that time. Did improve temporarily and has gotten worse again. Up multiple times at night, sometimes every 30 minutes.      Current Outpatient Medications:   •  albuterol sulfate HFA (PROAIR HFA) 108 (90 Base) MCG/ACT inhaler, Inhale 2 puffs Every 4 (Four) Hours As Needed for Wheezing., Disp: 1 inhaler, Rfl: 3  •  amitriptyline (ELAVIL) 25 MG tablet, Take 1-2 tablets at night as needed., Disp: 180 tablet, Rfl: 0  •  amLODIPine (NORVASC) 5 MG tablet, Take 2 tablets by mouth Daily., Disp: 30 tablet, Rfl: 5  •  cyclobenzaprine (FLEXERIL) 10 MG tablet, Take 1 tablet by mouth 3 (Three) Times a Day As Needed for Muscle Spasms., Disp: 30 tablet, Rfl: 0  •  losartan-hydrochlorothiazide (HYZAAR) 50-12.5 MG per tablet, Take 1 tablet by mouth Daily., Disp: 30 tablet, Rfl: 3  •  omeprazole (priLOSEC) 40 MG capsule, 1 capsule by mouth twice daily, Disp: 60 capsule, Rfl: 3  •  oxybutynin XL (Ditropan XL) 5 MG 24 hr tablet, Take 1 tablet by mouth Daily., Disp: 30 tablet, Rfl: 5  •  polyethylene glycol (MIRALAX) packet, Take 17 g by mouth Daily., Disp: 30 each, Rfl: 3  •  sodium-potassium-magnesium sulfates (SUPREP BOWEL PREP KIT) 17.5-3.13-1.6 GM/177ML solution oral solution, Take 1 bottle by mouth Take As Directed. Follow instructions that were mailed to your home. If you didn't receive these call [phone]., Disp: 2 bottle, Rfl: 0     PMFSH  The following  "portions of the patient's history were reviewed and updated as appropriate: allergies, current medications, past family history, past medical history, past social history, past surgical history and problem list.    Review of Systems   Constitutional: Negative for activity change, appetite change and fatigue.   HENT: Negative for congestion and rhinorrhea.    Respiratory: Positive for shortness of breath. Negative for chest tightness.    Cardiovascular: Negative for chest pain and palpitations.   Gastrointestinal: Negative for abdominal pain.   Genitourinary: Positive for frequency. Negative for dysuria.   Musculoskeletal: Negative for arthralgias and myalgias.   Neurological: Negative for dizziness, weakness, light-headedness and headaches.   Psychiatric/Behavioral: Negative for dysphoric mood. The patient is not nervous/anxious.        Objective   /82   Pulse 76   Resp 16   Ht 160 cm (63\")   Wt 87.5 kg (193 lb)   SpO2 98%   Breastfeeding No   BMI 34.19 kg/m²     Physical Exam   Constitutional: She appears well-developed and well-nourished.   HENT:   Head: Normocephalic.   Right Ear: Hearing, tympanic membrane, external ear and ear canal normal.   Left Ear: Hearing, tympanic membrane, external ear and ear canal normal.   Nose: Nose normal.   Mouth/Throat: Oropharynx is clear and moist.   Eyes: Pupils are equal, round, and reactive to light. Conjunctivae are normal.   Neck: Normal range of motion.   Cardiovascular: Normal rate, regular rhythm and normal heart sounds.   Trace edema in bilateral LE, pt says her swelling is at its best right now   Pulmonary/Chest: Effort normal and breath sounds normal. She has no decreased breath sounds. She has no wheezes. She has no rhonchi. She has no rales.   Musculoskeletal: Normal range of motion.   Neurological: She is alert.   Skin: Skin is warm and dry.   Psychiatric: She has a normal mood and affect. Her behavior is normal.   Nursing note and vitals " reviewed.           ASSESSMENT/PLAN    Problem List Items Addressed This Visit        Cardiovascular and Mediastinum    Hypertension     Hypertension is unchanged.  Dietary sodium restriction.  Weight loss.  Regular aerobic exercise.  Medication changes per orders. Due to LE swelling, decrease amlodipine to 5 mg and start losartan/hctz 50/12.5 mg that pt did not start at last visit.  Blood pressure will be reassessed in 4 weeks.         Relevant Medications    losartan-hydrochlorothiazide (HYZAAR) 50-12.5 MG per tablet    amLODIPine (NORVASC) 5 MG tablet       Genitourinary    Overactive bladder - Primary     Refer back to Urology for eval, pt has seen Dr Schaefer in the past.         Relevant Orders    Ambulatory Referral to Urology       Other    Leg swelling     Decrease amlodipine to 5 mg daily and start losartan/hctz 50/12.5 mg daily.                     Return in about 4 weeks (around 7/7/2020) for Follow up.

## 2020-06-11 NOTE — ASSESSMENT & PLAN NOTE
Hypertension is unchanged.  Dietary sodium restriction.  Weight loss.  Regular aerobic exercise.  Medication changes per orders. Due to LE swelling, decrease amlodipine to 5 mg and start losartan/hctz 50/12.5 mg that pt did not start at last visit.  Blood pressure will be reassessed in 4 weeks.

## 2020-06-24 ENCOUNTER — APPOINTMENT (OUTPATIENT)
Dept: MAMMOGRAPHY | Facility: HOSPITAL | Age: 60
End: 2020-06-24

## 2020-06-24 ENCOUNTER — APPOINTMENT (OUTPATIENT)
Dept: BONE DENSITY | Facility: HOSPITAL | Age: 60
End: 2020-06-24

## 2020-08-11 ENCOUNTER — TELEPHONE (OUTPATIENT)
Dept: INTERNAL MEDICINE | Facility: CLINIC | Age: 60
End: 2020-08-11

## 2020-08-11 NOTE — TELEPHONE ENCOUNTER
PATIENT CALLED WANTING TO SEE IF WE RECEIVED THE PAPER SHE FAXED TO THE OFFICE ON 8-7-20 ABOUT HER LAST PHYSICAL    PLEASE CALL IF WE RECEIVED THE INFORMATION     PATIENT CALL BACK [phone]

## 2020-09-05 DIAGNOSIS — I10 ESSENTIAL HYPERTENSION: ICD-10-CM

## 2020-09-05 RX ORDER — LOSARTAN POTASSIUM AND HYDROCHLOROTHIAZIDE 12.5; 5 MG/1; MG/1
TABLET ORAL
Qty: 90 TABLET | Refills: 0 | Status: SHIPPED | OUTPATIENT
Start: 2020-09-05 | End: 2021-01-11 | Stop reason: SDUPTHER

## 2020-09-24 ENCOUNTER — TELEPHONE (OUTPATIENT)
Dept: URGENT CARE | Facility: CLINIC | Age: 60
End: 2020-09-24

## 2020-09-24 ENCOUNTER — TELEPHONE (OUTPATIENT)
Dept: INTERNAL MEDICINE | Facility: CLINIC | Age: 60
End: 2020-09-24

## 2020-09-24 NOTE — TELEPHONE ENCOUNTER
PATIENT WENT TO URGENT CARE 2 WEEKS AGO FOR BRONCHITIS. URGENT CARE WAS SUPPOSE TO CALL IN A ZPACK FOR PATIENT. WHEN PATIENT NEVER RECEIVED MEDICATION SHE THEN CALLED BACK URGENT CARE TO TELL THEM THAT THEY NEVER SENT IN FOR THE ZPACK. PATIENT WAS TOLD TO FOLLOW UP WITH HER PROVIDER. PATIENT DOESN'T KNOW WHAT TO DO AND IS IN NEED OF THE ZPACK TO HELP HER BREATHE.    PLEASE ADVISE.  PATIENT CALL BACK NUMBER [phone]

## 2020-09-24 NOTE — TELEPHONE ENCOUNTER
Spoke with patient and she said she would wait until you were back in the office Friday morning, please advise

## 2020-09-25 RX ORDER — AZITHROMYCIN 250 MG/1
TABLET, FILM COATED ORAL
Qty: 6 TABLET | Refills: 0 | Status: SHIPPED | OUTPATIENT
Start: 2020-09-25 | End: 2020-11-28

## 2020-09-25 NOTE — TELEPHONE ENCOUNTER
Reviewed the note which showed diagnosis of sinusitis but no abx ordered. Sent in z-pack. She should follow up if symptoms not resolving.

## 2020-10-16 ENCOUNTER — TELEPHONE (OUTPATIENT)
Dept: INTERNAL MEDICINE | Facility: CLINIC | Age: 60
End: 2020-10-16

## 2020-10-16 NOTE — TELEPHONE ENCOUNTER
Patient is calling due to a cough that she has had for a few months now. Patient has been advised to go to urgent care but would like to go beyond that. Patient is wondering about getting a referral.    Patient call back:[phone]

## 2020-10-17 DIAGNOSIS — I10 ESSENTIAL HYPERTENSION: ICD-10-CM

## 2020-10-19 RX ORDER — AMLODIPINE BESYLATE 10 MG/1
TABLET ORAL
Qty: 90 TABLET | Refills: 1 | Status: SHIPPED | OUTPATIENT
Start: 2020-10-19 | End: 2020-12-03 | Stop reason: HOSPADM

## 2020-10-20 NOTE — TELEPHONE ENCOUNTER
Pt advised to be tested for covid and then call back and set up an appointment to be evaluated for the cough

## 2020-10-20 NOTE — TELEPHONE ENCOUNTER
I would need to see her to do an evaluation and determine if she needs a referral. Has she had Covid testing since she had the cough? If not, she could get rapid drive-through testing and then come in to be seen.

## 2020-10-27 DIAGNOSIS — G62.9 NEUROPATHY: ICD-10-CM

## 2020-10-27 RX ORDER — AMITRIPTYLINE HYDROCHLORIDE 25 MG/1
TABLET, FILM COATED ORAL
Qty: 180 TABLET | Refills: 1 | Status: SHIPPED | OUTPATIENT
Start: 2020-10-27 | End: 2021-04-06

## 2020-11-23 ENCOUNTER — TELEPHONE (OUTPATIENT)
Dept: INTERNAL MEDICINE | Facility: CLINIC | Age: 60
End: 2020-11-23

## 2020-11-23 RX ORDER — AZITHROMYCIN 250 MG/1
TABLET, FILM COATED ORAL
Qty: 6 TABLET | Refills: 0 | OUTPATIENT
Start: 2020-11-23

## 2020-11-23 NOTE — TELEPHONE ENCOUNTER
Pt advised to either do a video visit or go back to urgent care, so she is going to [facility] tomorrow

## 2020-11-23 NOTE — TELEPHONE ENCOUNTER
PATIENT STATES SHE DOES NOT HAVE TRANSPORTATION TODAY BUT SHE COULD COME IN TO THE OFFICE TOMORROW       PATIENT ALSO STATES SHE COULD NOT GET THROUGH TO THE URGENT TREATMENT CENTER   THEY DID NOT ANSWER    PLEASE ADVISE   PATIENT CALL BACK [phone]

## 2020-11-23 NOTE — TELEPHONE ENCOUNTER
Caller: Diane Denson    Relationship: Self    Best call back number: [phone]    Medication needed:   Requested Prescriptions     Pending Prescriptions Disp Refills   • azithromycin (Zithromax Z-Samuel) 250 MG tablet 6 tablet 0     Sig: Take 2 tablets the first day, then 1 tablet daily for 4 days.   PREDNISONE     When do you need the refill by: 11/23/20    What details did the patient provide when requesting the medication: Patient stated that she has spoken to Dr. Rosa about her symptoms.  They have not cleared up yet.    Does the patient have less than a 3 day supply:  [x] Yes  [] No    What is the patient's preferred pharmacy: [id number] - [place], [place] - [address] [place] [address] [phone] [facility] [phone]

## 2020-11-28 ENCOUNTER — APPOINTMENT (OUTPATIENT)
Dept: CT IMAGING | Facility: HOSPITAL | Age: 60
End: 2020-11-28

## 2020-11-28 ENCOUNTER — HOSPITAL ENCOUNTER (INPATIENT)
Facility: HOSPITAL | Age: 60
LOS: 5 days | Discharge: HOME | End: 2020-12-03
Attending: EMERGENCY MEDICINE | Admitting: INTERNAL MEDICINE

## 2020-11-28 DIAGNOSIS — J12.82 PNEUMONIA DUE TO COVID-19 VIRUS: Primary | ICD-10-CM

## 2020-11-28 DIAGNOSIS — R09.02 HYPOXEMIA: ICD-10-CM

## 2020-11-28 DIAGNOSIS — I10 ESSENTIAL HYPERTENSION: ICD-10-CM

## 2020-11-28 DIAGNOSIS — U07.1 PNEUMONIA DUE TO COVID-19 VIRUS: Primary | ICD-10-CM

## 2020-11-28 PROBLEM — Z00.00 HEALTH CARE MAINTENANCE: Status: RESOLVED | Noted: 2018-10-17 | Resolved: 2020-11-28

## 2020-11-28 PROBLEM — D72.819 LEUKOPENIA: Status: ACTIVE | Noted: 2020-11-28

## 2020-11-28 PROBLEM — K21.9 GERD (GASTROESOPHAGEAL REFLUX DISEASE): Status: ACTIVE | Noted: 2020-11-28

## 2020-11-28 PROBLEM — R79.89 ELEVATED LFTS: Status: ACTIVE | Noted: 2020-11-28

## 2020-11-28 PROBLEM — K62.1 RECTAL POLYP: Status: RESOLVED | Noted: 2020-04-03 | Resolved: 2020-11-28

## 2020-11-28 LAB
ALBUMIN SERPL-MCNC: 4 G/DL (ref 3.5–5.2)
ALBUMIN/GLOB SERPL: 1.2 G/DL
ALP SERPL-CCNC: 157 U/L (ref 39–117)
ALT SERPL W P-5'-P-CCNC: 54 U/L (ref 1–33)
ANION GAP SERPL CALCULATED.3IONS-SCNC: 13 MMOL/L (ref 5–15)
AST SERPL-CCNC: 38 U/L (ref 1–32)
BASOPHILS # BLD AUTO: 0.01 10*3/MM3 (ref 0–0.2)
BASOPHILS NFR BLD AUTO: 0.3 % (ref 0–1.5)
BILIRUB SERPL-MCNC: 0.4 MG/DL (ref 0–1.2)
BUN SERPL-MCNC: 13 MG/DL (ref 8–23)
BUN/CREAT SERPL: 14.1 (ref 7–25)
CALCIUM SPEC-SCNC: 8.7 MG/DL (ref 8.6–10.5)
CHLORIDE SERPL-SCNC: 100 MMOL/L (ref 98–107)
CO2 SERPL-SCNC: 25 MMOL/L (ref 22–29)
CREAT SERPL-MCNC: 0.92 MG/DL (ref 0.57–1)
D-LACTATE SERPL-SCNC: 1 MMOL/L (ref 0.5–2)
DEPRECATED RDW RBC AUTO: 42.5 FL (ref 37–54)
EOSINOPHIL # BLD AUTO: 0.01 10*3/MM3 (ref 0–0.4)
EOSINOPHIL NFR BLD AUTO: 0.3 % (ref 0.3–6.2)
ERYTHROCYTE [DISTWIDTH] IN BLOOD BY AUTOMATED COUNT: 12.6 % (ref 12.3–15.4)
FERRITIN SERPL-MCNC: 199.8 NG/ML (ref 13–150)
GFR SERPL CREATININE-BSD FRML MDRD: 75 ML/MIN/1.73
GLOBULIN UR ELPH-MCNC: 3.4 GM/DL
GLUCOSE SERPL-MCNC: 90 MG/DL (ref 65–99)
HAV IGM SERPL QL IA: NORMAL
HBV CORE IGM SERPL QL IA: NORMAL
HBV SURFACE AG SERPL QL IA: NORMAL
HCT VFR BLD AUTO: 43.6 % (ref 34–46.6)
HCV AB SER DONR QL: NORMAL
HGB BLD-MCNC: 13.7 G/DL (ref 12–15.9)
HOLD SPECIMEN: NORMAL
HOLD SPECIMEN: NORMAL
IMM GRANULOCYTES # BLD AUTO: 0.03 10*3/MM3 (ref 0–0.05)
IMM GRANULOCYTES NFR BLD AUTO: 1 % (ref 0–0.5)
LDH SERPL-CCNC: 239 U/L (ref 135–214)
LIPASE SERPL-CCNC: 17 U/L (ref 13–60)
LYMPHOCYTES # BLD AUTO: 0.74 10*3/MM3 (ref 0.7–3.1)
LYMPHOCYTES NFR BLD AUTO: 25.7 % (ref 19.6–45.3)
MCH RBC QN AUTO: 28.9 PG (ref 26.6–33)
MCHC RBC AUTO-ENTMCNC: 31.4 G/DL (ref 31.5–35.7)
MCV RBC AUTO: 92 FL (ref 79–97)
MONOCYTES # BLD AUTO: 0.45 10*3/MM3 (ref 0.1–0.9)
MONOCYTES NFR BLD AUTO: 15.6 % (ref 5–12)
NEUTROPHILS NFR BLD AUTO: 1.64 10*3/MM3 (ref 1.7–7)
NEUTROPHILS NFR BLD AUTO: 57.1 % (ref 42.7–76)
NRBC BLD AUTO-RTO: 0 /100 WBC (ref 0–0.2)
NT-PROBNP SERPL-MCNC: 22.5 PG/ML (ref 0–900)
PLATELET # BLD AUTO: 200 10*3/MM3 (ref 140–450)
PMV BLD AUTO: 10.1 FL (ref 6–12)
POTASSIUM SERPL-SCNC: 3.8 MMOL/L (ref 3.5–5.2)
PROCALCITONIN SERPL-MCNC: 0.05 NG/ML (ref 0–0.25)
PROT SERPL-MCNC: 7.4 G/DL (ref 6–8.5)
RBC # BLD AUTO: 4.74 10*6/MM3 (ref 3.77–5.28)
SODIUM SERPL-SCNC: 138 MMOL/L (ref 136–145)
TROPONIN T SERPL-MCNC: <0.01 NG/ML (ref 0–0.03)
TROPONIN T SERPL-MCNC: <0.01 NG/ML (ref 0–0.03)
WBC # BLD AUTO: 2.88 10*3/MM3 (ref 3.4–10.8)
WHOLE BLOOD HOLD SPECIMEN: NORMAL
WHOLE BLOOD HOLD SPECIMEN: NORMAL

## 2020-11-28 PROCEDURE — 87899 AGENT NOS ASSAY W/OPTIC: CPT | Performed by: NURSE PRACTITIONER

## 2020-11-28 PROCEDURE — 80074 ACUTE HEPATITIS PANEL: CPT | Performed by: NURSE PRACTITIONER

## 2020-11-28 PROCEDURE — 94640 AIRWAY INHALATION TREATMENT: CPT

## 2020-11-28 PROCEDURE — 93005 ELECTROCARDIOGRAM TRACING: CPT | Performed by: EMERGENCY MEDICINE

## 2020-11-28 PROCEDURE — 83605 ASSAY OF LACTIC ACID: CPT | Performed by: EMERGENCY MEDICINE

## 2020-11-28 PROCEDURE — 84145 PROCALCITONIN (PCT): CPT | Performed by: EMERGENCY MEDICINE

## 2020-11-28 PROCEDURE — 85025 COMPLETE CBC W/AUTO DIFF WBC: CPT | Performed by: EMERGENCY MEDICINE

## 2020-11-28 PROCEDURE — 80053 COMPREHEN METABOLIC PANEL: CPT | Performed by: EMERGENCY MEDICINE

## 2020-11-28 PROCEDURE — 83690 ASSAY OF LIPASE: CPT | Performed by: EMERGENCY MEDICINE

## 2020-11-28 PROCEDURE — 71275 CT ANGIOGRAPHY CHEST: CPT

## 2020-11-28 PROCEDURE — 84484 ASSAY OF TROPONIN QUANT: CPT | Performed by: EMERGENCY MEDICINE

## 2020-11-28 PROCEDURE — 0202U NFCT DS 22 TRGT SARS-COV-2: CPT | Performed by: NURSE PRACTITIONER

## 2020-11-28 PROCEDURE — 86901 BLOOD TYPING SEROLOGIC RH(D): CPT

## 2020-11-28 PROCEDURE — 25010000002 ENOXAPARIN PER 10 MG: Performed by: NURSE PRACTITIONER

## 2020-11-28 PROCEDURE — 99284 EMERGENCY DEPT VISIT MOD MDM: CPT

## 2020-11-28 PROCEDURE — 0 IOPAMIDOL PER 1 ML: Performed by: EMERGENCY MEDICINE

## 2020-11-28 PROCEDURE — 86900 BLOOD TYPING SEROLOGIC ABO: CPT

## 2020-11-28 PROCEDURE — 83880 ASSAY OF NATRIURETIC PEPTIDE: CPT | Performed by: EMERGENCY MEDICINE

## 2020-11-28 PROCEDURE — 99223 1ST HOSP IP/OBS HIGH 75: CPT | Performed by: INTERNAL MEDICINE

## 2020-11-28 PROCEDURE — 82728 ASSAY OF FERRITIN: CPT | Performed by: EMERGENCY MEDICINE

## 2020-11-28 PROCEDURE — 83615 LACTATE (LD) (LDH) ENZYME: CPT | Performed by: EMERGENCY MEDICINE

## 2020-11-28 RX ORDER — ASPIRIN 81 MG/1
324 TABLET, CHEWABLE ORAL ONCE
Status: COMPLETED | OUTPATIENT
Start: 2020-11-28 | End: 2020-11-28

## 2020-11-28 RX ORDER — SODIUM CHLORIDE 0.9 % (FLUSH) 0.9 %
10 SYRINGE (ML) INJECTION AS NEEDED
Status: DISCONTINUED | OUTPATIENT
Start: 2020-11-28 | End: 2020-12-03 | Stop reason: HOSPADM

## 2020-11-28 RX ORDER — ACETAMINOPHEN 650 MG/1
650 SUPPOSITORY RECTAL EVERY 4 HOURS PRN
Status: DISCONTINUED | OUTPATIENT
Start: 2020-11-28 | End: 2020-12-01

## 2020-11-28 RX ORDER — AMLODIPINE BESYLATE 10 MG/1
10 TABLET ORAL DAILY
Status: DISCONTINUED | OUTPATIENT
Start: 2020-11-29 | End: 2020-11-30

## 2020-11-28 RX ORDER — DEXAMETHASONE SODIUM PHOSPHATE 4 MG/ML
6 INJECTION, SOLUTION INTRA-ARTICULAR; INTRALESIONAL; INTRAMUSCULAR; INTRAVENOUS; SOFT TISSUE
Status: DISCONTINUED | OUTPATIENT
Start: 2020-11-29 | End: 2020-12-03 | Stop reason: HOSPADM

## 2020-11-28 RX ORDER — OXYBUTYNIN CHLORIDE 5 MG/1
5 TABLET, EXTENDED RELEASE ORAL DAILY
Status: DISCONTINUED | OUTPATIENT
Start: 2020-11-29 | End: 2020-12-03 | Stop reason: HOSPADM

## 2020-11-28 RX ORDER — ALBUTEROL SULFATE 90 UG/1
2 AEROSOL, METERED RESPIRATORY (INHALATION) EVERY 6 HOURS PRN
Status: DISCONTINUED | OUTPATIENT
Start: 2020-11-28 | End: 2020-12-03 | Stop reason: HOSPADM

## 2020-11-28 RX ORDER — BENZONATATE 100 MG/1
100 CAPSULE ORAL 3 TIMES DAILY PRN
Status: DISCONTINUED | OUTPATIENT
Start: 2020-11-28 | End: 2020-12-03 | Stop reason: HOSPADM

## 2020-11-28 RX ORDER — PANTOPRAZOLE SODIUM 40 MG/1
40 TABLET, DELAYED RELEASE ORAL EVERY MORNING
Status: DISCONTINUED | OUTPATIENT
Start: 2020-11-29 | End: 2020-12-03 | Stop reason: HOSPADM

## 2020-11-28 RX ORDER — POLYETHYLENE GLYCOL 3350 17 G/17G
17 POWDER, FOR SOLUTION ORAL DAILY
Status: DISCONTINUED | OUTPATIENT
Start: 2020-11-29 | End: 2020-12-03 | Stop reason: HOSPADM

## 2020-11-28 RX ORDER — GUAIFENESIN/DEXTROMETHORPHAN 100-10MG/5
5 SYRUP ORAL EVERY 4 HOURS PRN
Status: DISCONTINUED | OUTPATIENT
Start: 2020-11-28 | End: 2020-12-01

## 2020-11-28 RX ORDER — ACETAMINOPHEN 325 MG/1
650 TABLET ORAL EVERY 4 HOURS PRN
Status: DISCONTINUED | OUTPATIENT
Start: 2020-11-28 | End: 2020-12-01

## 2020-11-28 RX ORDER — SODIUM CHLORIDE 0.9 % (FLUSH) 0.9 %
10 SYRINGE (ML) INJECTION EVERY 12 HOURS SCHEDULED
Status: DISCONTINUED | OUTPATIENT
Start: 2020-11-28 | End: 2020-12-03 | Stop reason: HOSPADM

## 2020-11-28 RX ORDER — ALBUTEROL SULFATE 90 UG/1
2 AEROSOL, METERED RESPIRATORY (INHALATION)
Status: DISCONTINUED | OUTPATIENT
Start: 2020-11-28 | End: 2020-12-01

## 2020-11-28 RX ORDER — ACETAMINOPHEN 160 MG/5ML
650 SOLUTION ORAL EVERY 4 HOURS PRN
Status: DISCONTINUED | OUTPATIENT
Start: 2020-11-28 | End: 2020-12-01

## 2020-11-28 RX ADMIN — ALBUTEROL SULFATE 2 PUFF: 90 AEROSOL, METERED RESPIRATORY (INHALATION) at 20:50

## 2020-11-28 RX ADMIN — ENOXAPARIN SODIUM 40 MG: 40 INJECTION SUBCUTANEOUS at 20:44

## 2020-11-28 RX ADMIN — SODIUM CHLORIDE, PRESERVATIVE FREE 10 ML: 5 INJECTION INTRAVENOUS at 20:43

## 2020-11-28 RX ADMIN — ASPIRIN 324 MG: 81 TABLET, CHEWABLE ORAL at 14:42

## 2020-11-28 RX ADMIN — IOPAMIDOL 68 ML: 755 INJECTION, SOLUTION INTRAVENOUS at 16:41

## 2020-11-29 LAB
ABO GROUP BLD: NORMAL
ABO GROUP BLD: NORMAL
ALBUMIN SERPL-MCNC: 3.7 G/DL (ref 3.5–5.2)
ALBUMIN/GLOB SERPL: 1.2 G/DL
ALP SERPL-CCNC: 149 U/L (ref 39–117)
ALT SERPL W P-5'-P-CCNC: 42 U/L (ref 1–33)
ANION GAP SERPL CALCULATED.3IONS-SCNC: 10 MMOL/L (ref 5–15)
AST SERPL-CCNC: 30 U/L (ref 1–32)
B PARAPERT DNA SPEC QL NAA+PROBE: NOT DETECTED
B PERT DNA SPEC QL NAA+PROBE: NOT DETECTED
BASOPHILS # BLD AUTO: 0.01 10*3/MM3 (ref 0–0.2)
BASOPHILS NFR BLD AUTO: 0.4 % (ref 0–1.5)
BILIRUB CONJ SERPL-MCNC: <0.2 MG/DL (ref 0–0.3)
BILIRUB SERPL-MCNC: 0.3 MG/DL (ref 0–1.2)
BLD GP AB SCN SERPL QL: NEGATIVE
BUN SERPL-MCNC: 11 MG/DL (ref 8–23)
BUN/CREAT SERPL: 13.3 (ref 7–25)
C PNEUM DNA NPH QL NAA+NON-PROBE: NOT DETECTED
CALCIUM SPEC-SCNC: 8.5 MG/DL (ref 8.6–10.5)
CHLORIDE SERPL-SCNC: 100 MMOL/L (ref 98–107)
CK SERPL-CCNC: 74 U/L (ref 20–180)
CO2 SERPL-SCNC: 27 MMOL/L (ref 22–29)
CREAT SERPL-MCNC: 0.83 MG/DL (ref 0.57–1)
CRP SERPL-MCNC: 5.02 MG/DL (ref 0–0.5)
D DIMER PPP FEU-MCNC: 0.42 MCGFEU/ML (ref 0–0.56)
DEPRECATED RDW RBC AUTO: 42.2 FL (ref 37–54)
EOSINOPHIL # BLD AUTO: 0.01 10*3/MM3 (ref 0–0.4)
EOSINOPHIL NFR BLD AUTO: 0.4 % (ref 0.3–6.2)
ERYTHROCYTE [DISTWIDTH] IN BLOOD BY AUTOMATED COUNT: 12.5 % (ref 12.3–15.4)
FERRITIN SERPL-MCNC: 204.1 NG/ML (ref 13–150)
FIBRINOGEN PPP-MCNC: 455 MG/DL (ref 215–430)
FLUAV H1 2009 PAND RNA NPH QL NAA+PROBE: NOT DETECTED
FLUAV H1 HA GENE NPH QL NAA+PROBE: NOT DETECTED
FLUAV H3 RNA NPH QL NAA+PROBE: NOT DETECTED
FLUAV SUBTYP SPEC NAA+PROBE: NOT DETECTED
FLUBV RNA ISLT QL NAA+PROBE: NOT DETECTED
GFR SERPL CREATININE-BSD FRML MDRD: 84 ML/MIN/1.73
GLOBULIN UR ELPH-MCNC: 3.2 GM/DL
GLUCOSE SERPL-MCNC: 89 MG/DL (ref 65–99)
HADV DNA SPEC NAA+PROBE: NOT DETECTED
HCOV 229E RNA SPEC QL NAA+PROBE: NOT DETECTED
HCOV HKU1 RNA SPEC QL NAA+PROBE: NOT DETECTED
HCOV NL63 RNA SPEC QL NAA+PROBE: NOT DETECTED
HCOV OC43 RNA SPEC QL NAA+PROBE: NOT DETECTED
HCT VFR BLD AUTO: 40.5 % (ref 34–46.6)
HGB BLD-MCNC: 12.9 G/DL (ref 12–15.9)
HMPV RNA NPH QL NAA+NON-PROBE: NOT DETECTED
HPIV1 RNA SPEC QL NAA+PROBE: NOT DETECTED
HPIV2 RNA SPEC QL NAA+PROBE: NOT DETECTED
HPIV3 RNA NPH QL NAA+PROBE: NOT DETECTED
HPIV4 P GENE NPH QL NAA+PROBE: NOT DETECTED
IMM GRANULOCYTES # BLD AUTO: 0.01 10*3/MM3 (ref 0–0.05)
IMM GRANULOCYTES NFR BLD AUTO: 0.4 % (ref 0–0.5)
L PNEUMO1 AG UR QL IA: NEGATIVE
LDH SERPL-CCNC: 193 U/L (ref 135–214)
LYMPHOCYTES # BLD AUTO: 1.07 10*3/MM3 (ref 0.7–3.1)
LYMPHOCYTES NFR BLD AUTO: 43.3 % (ref 19.6–45.3)
M PNEUMO IGG SER IA-ACNC: NOT DETECTED
MCH RBC QN AUTO: 29.3 PG (ref 26.6–33)
MCHC RBC AUTO-ENTMCNC: 31.9 G/DL (ref 31.5–35.7)
MCV RBC AUTO: 91.8 FL (ref 79–97)
MONOCYTES # BLD AUTO: 0.33 10*3/MM3 (ref 0.1–0.9)
MONOCYTES NFR BLD AUTO: 13.4 % (ref 5–12)
NEUTROPHILS NFR BLD AUTO: 1.04 10*3/MM3 (ref 1.7–7)
NEUTROPHILS NFR BLD AUTO: 42.1 % (ref 42.7–76)
NRBC BLD AUTO-RTO: 0 /100 WBC (ref 0–0.2)
PLATELET # BLD AUTO: 205 10*3/MM3 (ref 140–450)
PMV BLD AUTO: 10.1 FL (ref 6–12)
POTASSIUM SERPL-SCNC: 3.5 MMOL/L (ref 3.5–5.2)
PROT SERPL-MCNC: 6.9 G/DL (ref 6–8.5)
RBC # BLD AUTO: 4.41 10*6/MM3 (ref 3.77–5.28)
RH BLD: POSITIVE
RH BLD: POSITIVE
RHINOVIRUS RNA SPEC NAA+PROBE: NOT DETECTED
RSV RNA NPH QL NAA+NON-PROBE: NOT DETECTED
S PNEUM AG SPEC QL LA: NEGATIVE
SARS-COV-2 RNA NPH QL NAA+NON-PROBE: DETECTED
SODIUM SERPL-SCNC: 137 MMOL/L (ref 136–145)
T&S EXPIRATION DATE: NORMAL
TROPONIN T SERPL-MCNC: <0.01 NG/ML (ref 0–0.03)
WBC # BLD AUTO: 2.47 10*3/MM3 (ref 3.4–10.8)

## 2020-11-29 PROCEDURE — 86901 BLOOD TYPING SEROLOGIC RH(D): CPT | Performed by: INTERNAL MEDICINE

## 2020-11-29 PROCEDURE — 84484 ASSAY OF TROPONIN QUANT: CPT | Performed by: NURSE PRACTITIONER

## 2020-11-29 PROCEDURE — 82248 BILIRUBIN DIRECT: CPT | Performed by: NURSE PRACTITIONER

## 2020-11-29 PROCEDURE — 63710000001 DEXAMETHASONE PER 0.25 MG: Performed by: NURSE PRACTITIONER

## 2020-11-29 PROCEDURE — XW033E5 INTRODUCTION OF REMDESIVIR ANTI-INFECTIVE INTO PERIPHERAL VEIN, PERCUTANEOUS APPROACH, NEW TECHNOLOGY GROUP 5: ICD-10-PCS | Performed by: INTERNAL MEDICINE

## 2020-11-29 PROCEDURE — 82550 ASSAY OF CK (CPK): CPT | Performed by: NURSE PRACTITIONER

## 2020-11-29 PROCEDURE — 86140 C-REACTIVE PROTEIN: CPT | Performed by: NURSE PRACTITIONER

## 2020-11-29 PROCEDURE — 83615 LACTATE (LD) (LDH) ENZYME: CPT | Performed by: NURSE PRACTITIONER

## 2020-11-29 PROCEDURE — 80053 COMPREHEN METABOLIC PANEL: CPT | Performed by: NURSE PRACTITIONER

## 2020-11-29 PROCEDURE — 83520 IMMUNOASSAY QUANT NOS NONAB: CPT | Performed by: NURSE PRACTITIONER

## 2020-11-29 PROCEDURE — 93005 ELECTROCARDIOGRAM TRACING: CPT | Performed by: INTERNAL MEDICINE

## 2020-11-29 PROCEDURE — 85384 FIBRINOGEN ACTIVITY: CPT | Performed by: NURSE PRACTITIONER

## 2020-11-29 PROCEDURE — 85025 COMPLETE CBC W/AUTO DIFF WBC: CPT | Performed by: NURSE PRACTITIONER

## 2020-11-29 PROCEDURE — 94799 UNLISTED PULMONARY SVC/PX: CPT

## 2020-11-29 PROCEDURE — 25010000002 ENOXAPARIN PER 10 MG: Performed by: NURSE PRACTITIONER

## 2020-11-29 PROCEDURE — 86900 BLOOD TYPING SEROLOGIC ABO: CPT | Performed by: INTERNAL MEDICINE

## 2020-11-29 PROCEDURE — 86850 RBC ANTIBODY SCREEN: CPT | Performed by: INTERNAL MEDICINE

## 2020-11-29 PROCEDURE — 85379 FIBRIN DEGRADATION QUANT: CPT | Performed by: NURSE PRACTITIONER

## 2020-11-29 PROCEDURE — 82728 ASSAY OF FERRITIN: CPT | Performed by: NURSE PRACTITIONER

## 2020-11-29 PROCEDURE — 99233 SBSQ HOSP IP/OBS HIGH 50: CPT | Performed by: INTERNAL MEDICINE

## 2020-11-29 RX ADMIN — SODIUM CHLORIDE, PRESERVATIVE FREE 10 ML: 5 INJECTION INTRAVENOUS at 20:15

## 2020-11-29 RX ADMIN — ALBUTEROL SULFATE 2 PUFF: 90 AEROSOL, METERED RESPIRATORY (INHALATION) at 16:19

## 2020-11-29 RX ADMIN — ENOXAPARIN SODIUM 40 MG: 40 INJECTION SUBCUTANEOUS at 20:15

## 2020-11-29 RX ADMIN — LOSARTAN POTASSIUM: 50 TABLET, FILM COATED ORAL at 09:24

## 2020-11-29 RX ADMIN — REMDESIVIR 100 MG: 100 INJECTION, POWDER, LYOPHILIZED, FOR SOLUTION INTRAVENOUS at 18:12

## 2020-11-29 RX ADMIN — POLYETHYLENE GLYCOL 3350 17 G: 17 POWDER, FOR SOLUTION ORAL at 09:24

## 2020-11-29 RX ADMIN — REMDESIVIR 200 MG: 100 INJECTION, POWDER, LYOPHILIZED, FOR SOLUTION INTRAVENOUS at 01:37

## 2020-11-29 RX ADMIN — AMLODIPINE BESYLATE 10 MG: 10 TABLET ORAL at 09:25

## 2020-11-29 RX ADMIN — DEXAMETHASONE 6 MG: 2 TABLET ORAL at 09:25

## 2020-11-29 RX ADMIN — OXYBUTYNIN CHLORIDE 5 MG: 5 TABLET, FILM COATED, EXTENDED RELEASE ORAL at 09:25

## 2020-11-29 RX ADMIN — SODIUM CHLORIDE, PRESERVATIVE FREE 10 ML: 5 INJECTION INTRAVENOUS at 09:26

## 2020-11-29 RX ADMIN — PANTOPRAZOLE SODIUM 40 MG: 40 TABLET, DELAYED RELEASE ORAL at 06:17

## 2020-11-29 RX ADMIN — ALBUTEROL SULFATE 2 PUFF: 90 AEROSOL, METERED RESPIRATORY (INHALATION) at 07:11

## 2020-11-29 RX ADMIN — ALBUTEROL SULFATE 2 PUFF: 90 AEROSOL, METERED RESPIRATORY (INHALATION) at 12:19

## 2020-11-29 RX ADMIN — ALBUTEROL SULFATE 2 PUFF: 90 AEROSOL, METERED RESPIRATORY (INHALATION) at 20:44

## 2020-11-29 RX ADMIN — GUAIFENESIN AND DEXTROMETHORPHAN 5 ML: 100; 10 SYRUP ORAL at 21:13

## 2020-11-30 LAB
ALBUMIN SERPL-MCNC: 3.9 G/DL (ref 3.5–5.2)
ALBUMIN/GLOB SERPL: 1.1 G/DL
ALP SERPL-CCNC: 149 U/L (ref 39–117)
ALT SERPL W P-5'-P-CCNC: 46 U/L (ref 1–33)
ANION GAP SERPL CALCULATED.3IONS-SCNC: 11 MMOL/L (ref 5–15)
AST SERPL-CCNC: 31 U/L (ref 1–32)
BASOPHILS # BLD MANUAL: 0 10*3/MM3 (ref 0–0.2)
BASOPHILS NFR BLD AUTO: 0 % (ref 0–1.5)
BILIRUB CONJ SERPL-MCNC: <0.2 MG/DL (ref 0–0.3)
BILIRUB SERPL-MCNC: 0.3 MG/DL (ref 0–1.2)
BUN SERPL-MCNC: 17 MG/DL (ref 8–23)
BUN/CREAT SERPL: 21 (ref 7–25)
CALCIUM SPEC-SCNC: 9.2 MG/DL (ref 8.6–10.5)
CHLORIDE SERPL-SCNC: 100 MMOL/L (ref 98–107)
CK SERPL-CCNC: 58 U/L (ref 20–180)
CO2 SERPL-SCNC: 25 MMOL/L (ref 22–29)
CREAT SERPL-MCNC: 0.81 MG/DL (ref 0.57–1)
CRP SERPL-MCNC: 4.72 MG/DL (ref 0–0.5)
DEPRECATED RDW RBC AUTO: 41.3 FL (ref 37–54)
EOSINOPHIL # BLD MANUAL: 0 10*3/MM3 (ref 0–0.4)
EOSINOPHIL NFR BLD MANUAL: 0 % (ref 0.3–6.2)
ERYTHROCYTE [DISTWIDTH] IN BLOOD BY AUTOMATED COUNT: 12.3 % (ref 12.3–15.4)
FERRITIN SERPL-MCNC: 235.2 NG/ML (ref 13–150)
GFR SERPL CREATININE-BSD FRML MDRD: 87 ML/MIN/1.73
GLOBULIN UR ELPH-MCNC: 3.5 GM/DL
GLUCOSE SERPL-MCNC: 105 MG/DL (ref 65–99)
HCT VFR BLD AUTO: 41.6 % (ref 34–46.6)
HGB BLD-MCNC: 13.3 G/DL (ref 12–15.9)
LYMPHOCYTES # BLD MANUAL: 0.7 10*3/MM3 (ref 0.7–3.1)
LYMPHOCYTES NFR BLD MANUAL: 21 % (ref 5–12)
LYMPHOCYTES NFR BLD MANUAL: 34 % (ref 19.6–45.3)
MCH RBC QN AUTO: 29.2 PG (ref 26.6–33)
MCHC RBC AUTO-ENTMCNC: 32 G/DL (ref 31.5–35.7)
MCV RBC AUTO: 91.4 FL (ref 79–97)
MONOCYTES # BLD AUTO: 0.43 10*3/MM3 (ref 0.1–0.9)
NEUTROPHILS # BLD AUTO: 0.82 10*3/MM3 (ref 1.7–7)
NEUTROPHILS NFR BLD MANUAL: 33 % (ref 42.7–76)
NEUTS BAND NFR BLD MANUAL: 7 % (ref 0–5)
PLAT MORPH BLD: NORMAL
PLATELET # BLD AUTO: 224 10*3/MM3 (ref 140–450)
PMV BLD AUTO: 10.3 FL (ref 6–12)
POTASSIUM SERPL-SCNC: 4 MMOL/L (ref 3.5–5.2)
PROT SERPL-MCNC: 7.4 G/DL (ref 6–8.5)
QT INTERVAL: 356 MS
QTC INTERVAL: 420 MS
RBC # BLD AUTO: 4.55 10*6/MM3 (ref 3.77–5.28)
RBC MORPH BLD: NORMAL
SMUDGE CELLS BLD QL SMEAR: ABNORMAL
SODIUM SERPL-SCNC: 136 MMOL/L (ref 136–145)
VARIANT LYMPHS NFR BLD MANUAL: 5 % (ref 0–5)
WBC # BLD AUTO: 2.06 10*3/MM3 (ref 3.4–10.8)

## 2020-11-30 PROCEDURE — 63710000001 DEXAMETHASONE PER 0.25 MG: Performed by: NURSE PRACTITIONER

## 2020-11-30 PROCEDURE — 85025 COMPLETE CBC W/AUTO DIFF WBC: CPT | Performed by: NURSE PRACTITIONER

## 2020-11-30 PROCEDURE — 94799 UNLISTED PULMONARY SVC/PX: CPT

## 2020-11-30 PROCEDURE — 99233 SBSQ HOSP IP/OBS HIGH 50: CPT | Performed by: INTERNAL MEDICINE

## 2020-11-30 PROCEDURE — 82248 BILIRUBIN DIRECT: CPT | Performed by: NURSE PRACTITIONER

## 2020-11-30 PROCEDURE — 80053 COMPREHEN METABOLIC PANEL: CPT | Performed by: NURSE PRACTITIONER

## 2020-11-30 PROCEDURE — 85007 BL SMEAR W/DIFF WBC COUNT: CPT | Performed by: NURSE PRACTITIONER

## 2020-11-30 PROCEDURE — 82728 ASSAY OF FERRITIN: CPT | Performed by: NURSE PRACTITIONER

## 2020-11-30 PROCEDURE — 86140 C-REACTIVE PROTEIN: CPT | Performed by: NURSE PRACTITIONER

## 2020-11-30 PROCEDURE — 82550 ASSAY OF CK (CPK): CPT | Performed by: NURSE PRACTITIONER

## 2020-11-30 PROCEDURE — 25010000002 ENOXAPARIN PER 10 MG: Performed by: NURSE PRACTITIONER

## 2020-11-30 RX ORDER — AMLODIPINE BESYLATE 2.5 MG/1
2.5 TABLET ORAL DAILY
Status: DISCONTINUED | OUTPATIENT
Start: 2020-12-01 | End: 2020-12-03 | Stop reason: HOSPADM

## 2020-11-30 RX ADMIN — ENOXAPARIN SODIUM 40 MG: 40 INJECTION SUBCUTANEOUS at 20:08

## 2020-11-30 RX ADMIN — SODIUM CHLORIDE, PRESERVATIVE FREE 10 ML: 5 INJECTION INTRAVENOUS at 20:08

## 2020-11-30 RX ADMIN — ALBUTEROL SULFATE 2 PUFF: 90 AEROSOL, METERED RESPIRATORY (INHALATION) at 12:20

## 2020-11-30 RX ADMIN — ALBUTEROL SULFATE 2 PUFF: 90 AEROSOL, METERED RESPIRATORY (INHALATION) at 07:02

## 2020-11-30 RX ADMIN — OXYBUTYNIN CHLORIDE 5 MG: 5 TABLET, FILM COATED, EXTENDED RELEASE ORAL at 08:23

## 2020-11-30 RX ADMIN — BENZONATATE 100 MG: 100 CAPSULE ORAL at 20:08

## 2020-11-30 RX ADMIN — LOSARTAN POTASSIUM: 50 TABLET, FILM COATED ORAL at 08:23

## 2020-11-30 RX ADMIN — ALBUTEROL SULFATE 2 PUFF: 90 AEROSOL, METERED RESPIRATORY (INHALATION) at 19:49

## 2020-11-30 RX ADMIN — SODIUM CHLORIDE, PRESERVATIVE FREE 10 ML: 5 INJECTION INTRAVENOUS at 08:24

## 2020-11-30 RX ADMIN — AMLODIPINE BESYLATE 10 MG: 10 TABLET ORAL at 08:23

## 2020-11-30 RX ADMIN — POLYETHYLENE GLYCOL 3350 17 G: 17 POWDER, FOR SOLUTION ORAL at 08:24

## 2020-11-30 RX ADMIN — DEXAMETHASONE 6 MG: 2 TABLET ORAL at 08:24

## 2020-11-30 RX ADMIN — REMDESIVIR 100 MG: 100 INJECTION, POWDER, LYOPHILIZED, FOR SOLUTION INTRAVENOUS at 17:46

## 2020-11-30 RX ADMIN — ALBUTEROL SULFATE 2 PUFF: 90 AEROSOL, METERED RESPIRATORY (INHALATION) at 16:05

## 2020-12-01 LAB
ALBUMIN SERPL-MCNC: 3.5 G/DL (ref 3.5–5.2)
ALBUMIN/GLOB SERPL: 1 G/DL
ALP SERPL-CCNC: 136 U/L (ref 39–117)
ALT SERPL W P-5'-P-CCNC: 174 U/L (ref 1–33)
ANION GAP SERPL CALCULATED.3IONS-SCNC: 9 MMOL/L (ref 5–15)
ARTERIAL PATENCY WRIST A: NORMAL
AST SERPL-CCNC: 157 U/L (ref 1–32)
ATMOSPHERIC PRESS: NORMAL MM[HG]
BASE EXCESS BLDA CALC-SCNC: NORMAL MMOL/L
BASOPHILS # BLD MANUAL: 0 10*3/MM3 (ref 0–0.2)
BASOPHILS NFR BLD AUTO: 0 % (ref 0–1.5)
BDY SITE: NORMAL
BILIRUB CONJ SERPL-MCNC: <0.2 MG/DL (ref 0–0.3)
BILIRUB SERPL-MCNC: 0.2 MG/DL (ref 0–1.2)
BODY TEMPERATURE: NORMAL
BUN SERPL-MCNC: 20 MG/DL (ref 8–23)
BUN/CREAT SERPL: 28.2 (ref 7–25)
CALCIUM SPEC-SCNC: 8.9 MG/DL (ref 8.6–10.5)
CHLORIDE SERPL-SCNC: 103 MMOL/L (ref 98–107)
CK SERPL-CCNC: 53 U/L (ref 20–180)
CO2 BLDA-SCNC: NORMAL MMOL/L
CO2 SERPL-SCNC: 26 MMOL/L (ref 22–29)
COHGB MFR BLD: NORMAL %
CREAT SERPL-MCNC: 0.71 MG/DL (ref 0.57–1)
CRP SERPL-MCNC: 1.86 MG/DL (ref 0–0.5)
D DIMER PPP FEU-MCNC: 0.37 MCGFEU/ML (ref 0–0.56)
DEPRECATED RDW RBC AUTO: 41.4 FL (ref 37–54)
EOSINOPHIL # BLD MANUAL: 0 10*3/MM3 (ref 0–0.4)
EOSINOPHIL NFR BLD MANUAL: 0 % (ref 0.3–6.2)
EPAP: NORMAL
ERYTHROCYTE [DISTWIDTH] IN BLOOD BY AUTOMATED COUNT: 12.4 % (ref 12.3–15.4)
FERRITIN SERPL-MCNC: 314.4 NG/ML (ref 13–150)
FIBRINOGEN PPP-MCNC: 423 MG/DL (ref 215–430)
GFR SERPL CREATININE-BSD FRML MDRD: 101 ML/MIN/1.73
GLOBULIN UR ELPH-MCNC: 3.4 GM/DL
GLUCOSE SERPL-MCNC: 112 MG/DL (ref 65–99)
HCO3 BLDA-SCNC: NORMAL MMOL/L
HCT VFR BLD AUTO: 40.4 % (ref 34–46.6)
HCT VFR BLD CALC: NORMAL %
HGB BLD-MCNC: 12.4 G/DL (ref 12–15.9)
HGB BLDA-MCNC: NORMAL G/DL
IL6 SERPL-MCNC: 17.5 PG/ML (ref 0–13)
INHALED O2 CONCENTRATION: NORMAL %
IPAP: NORMAL
LDH SERPL-CCNC: 343 U/L (ref 135–214)
LYMPHOCYTES # BLD MANUAL: 1.46 10*3/MM3 (ref 0.7–3.1)
LYMPHOCYTES NFR BLD MANUAL: 10 % (ref 5–12)
LYMPHOCYTES NFR BLD MANUAL: 29 % (ref 19.6–45.3)
MCH RBC QN AUTO: 27.8 PG (ref 26.6–33)
MCHC RBC AUTO-ENTMCNC: 30.7 G/DL (ref 31.5–35.7)
MCV RBC AUTO: 90.6 FL (ref 79–97)
METHGB BLD QL: NORMAL
MODALITY: NORMAL
MONOCYTES # BLD AUTO: 0.5 10*3/MM3 (ref 0.1–0.9)
NEUTROPHILS # BLD AUTO: 2.87 10*3/MM3 (ref 1.7–7)
NEUTROPHILS NFR BLD MANUAL: 53 % (ref 42.7–76)
NEUTS BAND NFR BLD MANUAL: 4 % (ref 0–5)
NOTE: NORMAL
OXYHGB MFR BLDV: NORMAL %
PAW @ PEAK INSP FLOW SETTING VENT: NORMAL CM[H2O]
PCO2 BLDA: NORMAL MM[HG]
PCO2 TEMP ADJ BLD: NORMAL MM[HG]
PH BLDA: NORMAL [PH]
PH, TEMP CORRECTED: NORMAL
PLAT MORPH BLD: NORMAL
PLATELET # BLD AUTO: 273 10*3/MM3 (ref 140–450)
PMV BLD AUTO: 10.2 FL (ref 6–12)
PO2 BLDA: NORMAL MM[HG]
PO2 TEMP ADJ BLD: NORMAL MM[HG]
POTASSIUM SERPL-SCNC: 4.6 MMOL/L (ref 3.5–5.2)
PROT SERPL-MCNC: 6.9 G/DL (ref 6–8.5)
RBC # BLD AUTO: 4.46 10*6/MM3 (ref 3.77–5.28)
RBC MORPH BLD: NORMAL
SMUDGE CELLS BLD QL SMEAR: ABNORMAL
SODIUM SERPL-SCNC: 138 MMOL/L (ref 136–145)
TOTAL RATE: NORMAL
VARIANT LYMPHS NFR BLD MANUAL: 4 % (ref 0–5)
WBC # BLD AUTO: 5.03 10*3/MM3 (ref 3.4–10.8)

## 2020-12-01 PROCEDURE — 85379 FIBRIN DEGRADATION QUANT: CPT | Performed by: NURSE PRACTITIONER

## 2020-12-01 PROCEDURE — 86140 C-REACTIVE PROTEIN: CPT | Performed by: NURSE PRACTITIONER

## 2020-12-01 PROCEDURE — 25010000002 ENOXAPARIN PER 10 MG: Performed by: NURSE PRACTITIONER

## 2020-12-01 PROCEDURE — 85025 COMPLETE CBC W/AUTO DIFF WBC: CPT | Performed by: NURSE PRACTITIONER

## 2020-12-01 PROCEDURE — 80053 COMPREHEN METABOLIC PANEL: CPT | Performed by: NURSE PRACTITIONER

## 2020-12-01 PROCEDURE — 25010000002 DEXAMETHASONE PER 1 MG: Performed by: NURSE PRACTITIONER

## 2020-12-01 PROCEDURE — 82248 BILIRUBIN DIRECT: CPT | Performed by: NURSE PRACTITIONER

## 2020-12-01 PROCEDURE — 99233 SBSQ HOSP IP/OBS HIGH 50: CPT | Performed by: INTERNAL MEDICINE

## 2020-12-01 PROCEDURE — 83615 LACTATE (LD) (LDH) ENZYME: CPT | Performed by: NURSE PRACTITIONER

## 2020-12-01 PROCEDURE — 85007 BL SMEAR W/DIFF WBC COUNT: CPT | Performed by: NURSE PRACTITIONER

## 2020-12-01 PROCEDURE — 85384 FIBRINOGEN ACTIVITY: CPT | Performed by: NURSE PRACTITIONER

## 2020-12-01 PROCEDURE — 82550 ASSAY OF CK (CPK): CPT | Performed by: NURSE PRACTITIONER

## 2020-12-01 PROCEDURE — 82728 ASSAY OF FERRITIN: CPT | Performed by: NURSE PRACTITIONER

## 2020-12-01 RX ORDER — ALBUTEROL SULFATE 90 UG/1
2 AEROSOL, METERED RESPIRATORY (INHALATION) 4 TIMES DAILY PRN
Status: DISCONTINUED | OUTPATIENT
Start: 2020-12-01 | End: 2020-12-03 | Stop reason: HOSPADM

## 2020-12-01 RX ORDER — SODIUM CHLORIDE 9 MG/ML
75 INJECTION, SOLUTION INTRAVENOUS CONTINUOUS
Status: DISCONTINUED | OUTPATIENT
Start: 2020-12-01 | End: 2020-12-03

## 2020-12-01 RX ORDER — GUAIFENESIN 600 MG/1
600 TABLET, EXTENDED RELEASE ORAL EVERY 12 HOURS SCHEDULED
Status: DISCONTINUED | OUTPATIENT
Start: 2020-12-01 | End: 2020-12-03 | Stop reason: HOSPADM

## 2020-12-01 RX ADMIN — SODIUM CHLORIDE, PRESERVATIVE FREE 10 ML: 5 INJECTION INTRAVENOUS at 09:26

## 2020-12-01 RX ADMIN — AMLODIPINE BESYLATE 2.5 MG: 2.5 TABLET ORAL at 09:25

## 2020-12-01 RX ADMIN — POLYETHYLENE GLYCOL 3350 17 G: 17 POWDER, FOR SOLUTION ORAL at 09:27

## 2020-12-01 RX ADMIN — ENOXAPARIN SODIUM 40 MG: 40 INJECTION SUBCUTANEOUS at 22:47

## 2020-12-01 RX ADMIN — GUAIFENESIN 600 MG: 600 TABLET, EXTENDED RELEASE ORAL at 22:47

## 2020-12-01 RX ADMIN — OXYBUTYNIN CHLORIDE 5 MG: 5 TABLET, FILM COATED, EXTENDED RELEASE ORAL at 09:28

## 2020-12-01 RX ADMIN — REMDESIVIR 100 MG: 100 INJECTION, POWDER, LYOPHILIZED, FOR SOLUTION INTRAVENOUS at 17:05

## 2020-12-01 RX ADMIN — SODIUM CHLORIDE, PRESERVATIVE FREE 10 ML: 5 INJECTION INTRAVENOUS at 22:47

## 2020-12-01 RX ADMIN — DEXAMETHASONE SODIUM PHOSPHATE 6 MG: 4 INJECTION, SOLUTION INTRAMUSCULAR; INTRAVENOUS at 09:28

## 2020-12-01 RX ADMIN — PANTOPRAZOLE SODIUM 40 MG: 40 TABLET, DELAYED RELEASE ORAL at 09:27

## 2020-12-01 RX ADMIN — SODIUM CHLORIDE 75 ML/HR: 9 INJECTION, SOLUTION INTRAVENOUS at 19:55

## 2020-12-01 RX ADMIN — LOSARTAN POTASSIUM: 50 TABLET, FILM COATED ORAL at 09:28

## 2020-12-01 NOTE — PLAN OF CARE
Goal Outcome Evaluation:  Plan of Care Reviewed With: patient  Progress: improving  Outcome Summary: MOSHE Phillips has been on RA this afternoon and is satting in low 90s. She reported feeling better today. Will cont to monitor.

## 2020-12-01 NOTE — PROGRESS NOTES
[facility] Medicine Services  PROGRESS NOTE    Patient Name: Diane Denson  : 1960  MRN: [id number]    Date of Admission: 2020  Primary Care Physician: Cindy Rosa PA    Subjective   Subjective     CC:  COVID 19 ammonia    HPI:  Still experiencing coughing and dyspnea on exertion, but overall feels better overall compared to yesterday    Review of Systems  Gen- No fevers, chills  CV- No chest pain, palpitations  Resp- + cough and dyspnea  GI- No N/V/D, abd pain        Objective   Objective     Vital Signs:   Temp:  [98 °F (36.7 °C)-98.5 °F (36.9 °C)] 98 °F (36.7 °C)  Heart Rate:  [65-81] 81  Resp:  [16-20] 17  BP: (116-142)/(68-86) 123/86        Physical Exam:  With patient's consent, physical exam was conducted via visual telemedicine encounter in the interest of PPE conservation and to limit provider exposure to COVID-19.     Constitutional - no acute distress, nontoxic, in bed  HEENT-NCAT, mucous membranes moist  CV-RRR by telemetry  Resp-nonlabored, speaks in full sentences  Abd-nondistended, overweight  Ext-No lower extremity cyanosis, clubbing or edema bilaterally  Neuro-alert and oriented, speech clear, moves all extremities   Psych-normal affect   Skin- No rash on exposed UE or LE bilaterally      Results Reviewed:  Results from last 7 days   Lab Units 20  0936 20  0716 20  0634 20  1408   WBC 10*3/mm3 5.03 2.06* 2.47* 2.88*   HEMOGLOBIN g/dL 12.4 13.3 12.9 13.7   HEMATOCRIT % 40.4 41.6 40.5 43.6   PLATELETS 10*3/mm3 273 224 205 200   PROCALCITONIN ng/mL  --   --   --  0.05     Results from last 7 days   Lab Units 20  0936 20  0716 20  0634 20  1555 20  1408   SODIUM mmol/L 138 136 137  --  138   POTASSIUM mmol/L 4.6 4.0 3.5  --  3.8   CHLORIDE mmol/L 103 100 100  --  100   CO2 mmol/L 26.0 25.0 27.0  --  25.0   BUN mg/dL 20 17 11  --  13   CREATININE mg/dL 0.71 0.81 0.83  --  0.92   GLUCOSE mg/dL  112* 105* 89  --  90   CALCIUM mg/dL 8.9 9.2 8.5*  --  8.7   ALT (SGPT) U/L 174* 46* 42*  --  54*   AST (SGOT) U/L 157* 31 30  --  38*   TROPONIN T ng/mL  --   --  <0.010 <0.010 <0.010   PROBNP pg/mL  --   --   --   --  22.5     Estimated Creatinine Clearance: 88.5 mL/min (by C-G formula based on SCr of 0.71 mg/dL).    Microbiology Results Abnormal     Procedure Component Value - Date/Time    S. Pneumo Ag Urine or CSF - Urine, Urine, Clean Catch [[id number]]  (Normal) Collected: 11/28/20 2238    Lab Status: Final result Specimen: Urine, Clean Catch Updated: 11/29/20 1251     Strep Pneumo Ag Negative    Legionella Antigen, Urine - Urine, Urine, Clean Catch [[id number]]  (Normal) Collected: 11/28/20 2238    Lab Status: Final result Specimen: Urine, Clean Catch Updated: 11/29/20 1251     LEGIONELLA ANTIGEN, URINE Negative    Respiratory Panel PCR w/COVID-19(SARS-CoV-2) REUBEN/KRISTIAN/ELAYNE/PAD/COR/MAD/JAQUELIN In-House, NP Swab in UTM/VTM, 3-4 HR TAT - Swab, Nasopharynx [[id number]]  (Abnormal) Collected: 11/28/20 2238    Lab Status: Final result Specimen: Swab from Nasopharynx Updated: 11/29/20 0012     ADENOVIRUS, PCR Not Detected     Coronavirus 229E Not Detected     Coronavirus HKU1 Not Detected     Coronavirus NL63 Not Detected     Coronavirus OC43 Not Detected     COVID19 Detected     Human Metapneumovirus Not Detected     Human Rhinovirus/Enterovirus Not Detected     Influenza A PCR Not Detected     Influenza A H1 Not Detected     Influenza A H1 2009 PCR Not Detected     Influenza A H3 Not Detected     Influenza B PCR Not Detected     Parainfluenza Virus 1 Not Detected     Parainfluenza Virus 2 Not Detected     Parainfluenza Virus 3 Not Detected     Parainfluenza Virus 4 Not Detected     RSV, PCR Not Detected     Bordetella pertussis pcr Not Detected     Bordetella parapertussis PCR Not Detected     Chlamydophila pneumoniae PCR Not Detected     Mycoplasma pneumo by PCR Not Detected    Narrative:      Fact sheet for providers:  https://docs.[employer]/wp-content/uploads/AAX0006-3741-CS5.1-EUA-Provider-Fact-Sheet-3.pdf    Fact sheet for patients: https://docs.[employer]/wp-content/uploads/WUO5179-7673-CV8.1-EUA-Patient-Fact-Sheet-1.pdf          Imaging Results (Last 24 Hours)     ** No results found for the last 24 hours. **               I have reviewed the medications:  Scheduled Meds:amLODIPine, 2.5 mg, Oral, Daily  dexamethasone, 6 mg, Oral, Daily With Breakfast    Or  dexamethasone, 6 mg, Intravenous, Daily With Breakfast  enoxaparin, 40 mg, Subcutaneous, Nightly  guaiFENesin, 600 mg, Oral, Q12H  losartan-HCTZ (HYZAAR) 50-12.5 combo dose, , Oral, Daily  oxybutynin XL, 5 mg, Oral, Daily  pantoprazole, 40 mg, Oral, QAM  polyethylene glycol, 17 g, Oral, Daily  sodium chloride, 10 mL, Intravenous, Q12H      Continuous Infusions:Pharmacy Consult - Remdesivir,       PRN Meds:.•  acetaminophen **OR** acetaminophen **OR** acetaminophen  •  albuterol sulfate HFA  •  albuterol sulfate HFA  •  benzonatate  •  guaiFENesin-dextromethorphan  •  Pharmacy Consult - Remdesivir  •  sodium chloride  •  sodium chloride    Assessment/Plan   Assessment & Plan     Active Hospital Problems    Diagnosis  POA   • **Pneumonia due to COVID-19 virus [U07.1, J12.89]  Yes   • Elevated LFTs [R79.89]  Yes   • Leukopenia [D72.819]  Yes   • GERD (gastroesophageal reflux disease) [K21.9]  Yes   • Neuropathy [G62.9]  Yes   • Hyperlipidemia [E78.5]  Yes   • Hypertension [I10]  Yes   • Hypothyroidism [E03.9]  Yes      Resolved Hospital Problems   No resolved problems to display.        Brief Hospital Course to date:  Diane Denson is a 60 y.o. female with history of hypertension, hyperlipidemia, GERD, anxiety and depression who presented cough, myalgias, fatigue and shortness of breath particularly with exertion.  She also complained of chest pain with coughing and deep breathing.  Her spouse was recently admitted to this hospital for COVID-19 pneumonia.   They believe they contracted the virus from a coworker who tested +2 weeks ago.    COVID-19 pneumonia  -CT angiogram from admission showed scattered bilateral patchy groundglass infiltrates consistent with normal coronavirus infection  -Patient is currently on 3.5 L supplemental oxygen via nasal cannula to maintain oxygen saturations of 95%  -Remdesivir dose 4 of 5 -- however elevated LFTs today (, , normal bilirubin). Will hold tomorrow's infusion, repeat AM hepatic function panel, then reassess whether or not to continue this medication  -Dexamethasone 6 mg p.o., dose 3 of 10  -Albuterol nebs  -Currently on HCTZ but consider additional diuresis as needed  -CRP 1.86 today, 4.72 yesterday.  Continue to monitor disease progression labs daily.    Pleuritic chest pain  -No PE on CTA done at admission  -Troponin x3 <0.01  -EKG normal sinus rhythm    HTN  -Continue Hyzaar and Norvasc    GERD  -PPI daily    Elevated LFTs  -As above, ,  and total bilirubin 0.2 by today's labs reported at 9:36 AM  -Suspect elevation due to a combination of ongoing COVID-19 infection as well as remdesivir infusion  -I have held further infusion of remdesivir and will repeat CMP and INR in the morning  -check acute hepatitis panel  -prn tylenol discontinued (although patient has not received any doses while hospitalized)  -if further worsening of liver function tests, consider N-acetyl cystine.  -discussed findings and plan with patient.      DVT Prophylaxis: Lovenox      Disposition: I expect the patient to be discharged TBD    CODE STATUS:   Code Status and Medical Interventions:   Ordered at: 11/28/20 2001     Code Status:    CPR     Medical Interventions (Level of Support Prior to Arrest):    Full       Vinnie Mercer MD  12/01/20

## 2020-12-01 NOTE — PLAN OF CARE
Goal Outcome Evaluation:  Plan of Care Reviewed With: patient  Progress: improving  Outcome Summary: Pt currently on 1L, VSS, no complaints overnight.  will continue to monitor.

## 2020-12-01 NOTE — PROGRESS NOTES
Continued Stay Note   [place]     Patient Name: Diane Denson  MRN: [id number]  Today's Date: 12/1/2020    Admit Date: 11/28/2020    Discharge Plan     Row Name 12/01/20 1508       Plan    Plan  update    Patient/Family in Agreement with Plan  yes    Plan Comments  Spoke with patient via telephone regarding discharge plan who reports she is feeling ok today but gets SOA when she is up moving around.  She is currently on 1L O2.  No new discharge needs verbalized.  CM following.  Patient plan is to discharge home via car with family to transport.    Final Discharge Disposition Code  01 - home or self-care        Discharge Codes    No documentation.             Anahi Paredes RN

## 2020-12-02 LAB
ALBUMIN SERPL-MCNC: 3.3 G/DL (ref 3.5–5.2)
ALBUMIN/GLOB SERPL: 1.2 G/DL
ALP SERPL-CCNC: 131 U/L (ref 39–117)
ALT SERPL W P-5'-P-CCNC: 147 U/L (ref 1–33)
ANION GAP SERPL CALCULATED.3IONS-SCNC: 9 MMOL/L (ref 5–15)
AST SERPL-CCNC: 70 U/L (ref 1–32)
BASOPHILS # BLD AUTO: 0.01 10*3/MM3 (ref 0–0.2)
BASOPHILS NFR BLD AUTO: 0.2 % (ref 0–1.5)
BILIRUB CONJ SERPL-MCNC: <0.2 MG/DL (ref 0–0.3)
BILIRUB SERPL-MCNC: <0.2 MG/DL (ref 0–1.2)
BUN SERPL-MCNC: 17 MG/DL (ref 8–23)
BUN/CREAT SERPL: 21.3 (ref 7–25)
CALCIUM SPEC-SCNC: 8.4 MG/DL (ref 8.6–10.5)
CHLORIDE SERPL-SCNC: 104 MMOL/L (ref 98–107)
CK SERPL-CCNC: 38 U/L (ref 20–180)
CO2 SERPL-SCNC: 25 MMOL/L (ref 22–29)
CREAT SERPL-MCNC: 0.8 MG/DL (ref 0.57–1)
CRP SERPL-MCNC: 0.99 MG/DL (ref 0–0.5)
DEPRECATED RDW RBC AUTO: 41.2 FL (ref 37–54)
EOSINOPHIL # BLD AUTO: 0 10*3/MM3 (ref 0–0.4)
EOSINOPHIL NFR BLD AUTO: 0 % (ref 0.3–6.2)
ERYTHROCYTE [DISTWIDTH] IN BLOOD BY AUTOMATED COUNT: 12.4 % (ref 12.3–15.4)
FERRITIN SERPL-MCNC: 224.6 NG/ML (ref 13–150)
GFR SERPL CREATININE-BSD FRML MDRD: 88 ML/MIN/1.73
GLOBULIN UR ELPH-MCNC: 2.7 GM/DL
GLUCOSE SERPL-MCNC: 159 MG/DL (ref 65–99)
HAV IGM SERPL QL IA: NORMAL
HBV CORE IGM SERPL QL IA: NORMAL
HBV SURFACE AG SERPL QL IA: NORMAL
HCT VFR BLD AUTO: 38.8 % (ref 34–46.6)
HCV AB SER DONR QL: NORMAL
HGB BLD-MCNC: 12.2 G/DL (ref 12–15.9)
IMM GRANULOCYTES # BLD AUTO: 0.06 10*3/MM3 (ref 0–0.05)
IMM GRANULOCYTES NFR BLD AUTO: 1 % (ref 0–0.5)
INR PPP: 1.22 (ref 0.85–1.16)
LYMPHOCYTES # BLD AUTO: 1.35 10*3/MM3 (ref 0.7–3.1)
LYMPHOCYTES NFR BLD AUTO: 22 % (ref 19.6–45.3)
MCH RBC QN AUTO: 28.9 PG (ref 26.6–33)
MCHC RBC AUTO-ENTMCNC: 31.4 G/DL (ref 31.5–35.7)
MCV RBC AUTO: 91.9 FL (ref 79–97)
MONOCYTES # BLD AUTO: 0.57 10*3/MM3 (ref 0.1–0.9)
MONOCYTES NFR BLD AUTO: 9.3 % (ref 5–12)
NEUTROPHILS NFR BLD AUTO: 4.15 10*3/MM3 (ref 1.7–7)
NEUTROPHILS NFR BLD AUTO: 67.5 % (ref 42.7–76)
NRBC BLD AUTO-RTO: 0 /100 WBC (ref 0–0.2)
PLATELET # BLD AUTO: 285 10*3/MM3 (ref 140–450)
PMV BLD AUTO: 10.5 FL (ref 6–12)
POTASSIUM SERPL-SCNC: 4.5 MMOL/L (ref 3.5–5.2)
PROT SERPL-MCNC: 6 G/DL (ref 6–8.5)
PROTHROMBIN TIME: 15.1 SECONDS (ref 11.5–14)
RBC # BLD AUTO: 4.22 10*6/MM3 (ref 3.77–5.28)
SODIUM SERPL-SCNC: 138 MMOL/L (ref 136–145)
WBC # BLD AUTO: 6.14 10*3/MM3 (ref 3.4–10.8)

## 2020-12-02 PROCEDURE — 25010000002 ENOXAPARIN PER 10 MG: Performed by: NURSE PRACTITIONER

## 2020-12-02 PROCEDURE — 82248 BILIRUBIN DIRECT: CPT | Performed by: NURSE PRACTITIONER

## 2020-12-02 PROCEDURE — 82550 ASSAY OF CK (CPK): CPT | Performed by: NURSE PRACTITIONER

## 2020-12-02 PROCEDURE — 99233 SBSQ HOSP IP/OBS HIGH 50: CPT | Performed by: INTERNAL MEDICINE

## 2020-12-02 PROCEDURE — 80053 COMPREHEN METABOLIC PANEL: CPT | Performed by: NURSE PRACTITIONER

## 2020-12-02 PROCEDURE — 85610 PROTHROMBIN TIME: CPT | Performed by: INTERNAL MEDICINE

## 2020-12-02 PROCEDURE — 82728 ASSAY OF FERRITIN: CPT | Performed by: NURSE PRACTITIONER

## 2020-12-02 PROCEDURE — 85025 COMPLETE CBC W/AUTO DIFF WBC: CPT | Performed by: NURSE PRACTITIONER

## 2020-12-02 PROCEDURE — 80074 ACUTE HEPATITIS PANEL: CPT | Performed by: INTERNAL MEDICINE

## 2020-12-02 PROCEDURE — 86140 C-REACTIVE PROTEIN: CPT | Performed by: NURSE PRACTITIONER

## 2020-12-02 PROCEDURE — 25010000002 DEXAMETHASONE PER 1 MG: Performed by: NURSE PRACTITIONER

## 2020-12-02 RX ADMIN — AMLODIPINE BESYLATE 2.5 MG: 2.5 TABLET ORAL at 09:17

## 2020-12-02 RX ADMIN — OXYBUTYNIN CHLORIDE 5 MG: 5 TABLET, FILM COATED, EXTENDED RELEASE ORAL at 09:18

## 2020-12-02 RX ADMIN — GUAIFENESIN 600 MG: 600 TABLET, EXTENDED RELEASE ORAL at 21:36

## 2020-12-02 RX ADMIN — REMDESIVIR 100 MG: 100 INJECTION, POWDER, LYOPHILIZED, FOR SOLUTION INTRAVENOUS at 12:42

## 2020-12-02 RX ADMIN — SODIUM CHLORIDE, PRESERVATIVE FREE 10 ML: 5 INJECTION INTRAVENOUS at 09:18

## 2020-12-02 RX ADMIN — ENOXAPARIN SODIUM 40 MG: 40 INJECTION SUBCUTANEOUS at 21:36

## 2020-12-02 RX ADMIN — PANTOPRAZOLE SODIUM 40 MG: 40 TABLET, DELAYED RELEASE ORAL at 06:13

## 2020-12-02 RX ADMIN — POLYETHYLENE GLYCOL 3350 17 G: 17 POWDER, FOR SOLUTION ORAL at 09:16

## 2020-12-02 RX ADMIN — DEXAMETHASONE SODIUM PHOSPHATE 6 MG: 4 INJECTION, SOLUTION INTRAMUSCULAR; INTRAVENOUS at 09:18

## 2020-12-02 RX ADMIN — LOSARTAN POTASSIUM: 50 TABLET, FILM COATED ORAL at 09:18

## 2020-12-02 RX ADMIN — GUAIFENESIN 600 MG: 600 TABLET, EXTENDED RELEASE ORAL at 09:17

## 2020-12-02 NOTE — PROGRESS NOTES
[facility] Medicine Services  PROGRESS NOTE    Patient Name: Diane Denson  : 1960  MRN: [id number]    Date of Admission: 2020  Primary Care Physician: Cindy Rosa PA    Subjective   Subjective     CC:  COVID 19 ammonia    HPI:  Denies cough or shortness of breath, denies loose stool    Review of Systems  Gen- No fevers, chills  CV- No chest pain, palpitations  Resp- No cough, dyspnea  GI- No N/V/D, abd pain      Objective   Objective     Vital Signs:   Temp:  [97 °F (36.1 °C)-98.1 °F (36.7 °C)] 97 °F (36.1 °C)  Heart Rate:  [71-81] 81  Resp:  [16-18] 16  BP: (127-158)/(81-96) 138/84        Physical Exam:  With patient's consent, physical exam was conducted via visual telemedicine encounter in the interest of PPE conservation and to limit provider exposure to COVID-19.     Constitutional - no acute distress, nontoxic, in bed  HEENT-NCAT, mucous membranes moist  CV-RRR by telemetry  Resp-non labored, speaks in full sentences  Abd-non distended, overweight  Ext-No lower extremity cyanosis, clubbing or edema bilaterally  Neuro-alert and oriented, speech clear, moves all extremities   Psych-normal affect   Skin- No rash on exposed UE or LE bilaterally      Results Reviewed:  Results from last 7 days   Lab Units 20  0535 20  0936 20  0716  20  1408   WBC 10*3/mm3 6.14 5.03 2.06*   < > 2.88*   HEMOGLOBIN g/dL 12.2 12.4 13.3   < > 13.7   HEMATOCRIT % 38.8 40.4 41.6   < > 43.6   PLATELETS 10*3/mm3 285 273 224   < > 200   INR  1.22*  --   --   --   --    PROCALCITONIN ng/mL  --   --   --   --  0.05    < > = values in this interval not displayed.     Results from last 7 days   Lab Units 20  0535 20  0936 20  0716 20  0634 20  1555 20  1408   SODIUM mmol/L 138 138 136 137  --  138   POTASSIUM mmol/L 4.5 4.6 4.0 3.5  --  3.8   CHLORIDE mmol/L 104 103 100 100  --  100   CO2 mmol/L 25.0 26.0 25.0 27.0  --  25.0    BUN mg/dL 17 20 17 11  --  13   CREATININE mg/dL 0.80 0.71 0.81 0.83  --  0.92   GLUCOSE mg/dL 159* 112* 105* 89  --  90   CALCIUM mg/dL 8.4* 8.9 9.2 8.5*  --  8.7   ALT (SGPT) U/L 147* 174* 46* 42*  --  54*   AST (SGOT) U/L 70* 157* 31 30  --  38*   TROPONIN T ng/mL  --   --   --  <0.010 <0.010 <0.010   PROBNP pg/mL  --   --   --   --   --  22.5     Estimated Creatinine Clearance: 78.5 mL/min (by C-G formula based on SCr of 0.8 mg/dL).    Microbiology Results Abnormal     Procedure Component Value - Date/Time    S. Pneumo Ag Urine or CSF - Urine, Urine, Clean Catch [[id number]]  (Normal) Collected: 11/28/20 2238    Lab Status: Final result Specimen: Urine, Clean Catch Updated: 11/29/20 1251     Strep Pneumo Ag Negative    Legionella Antigen, Urine - Urine, Urine, Clean Catch [[id number]]  (Normal) Collected: 11/28/20 2238    Lab Status: Final result Specimen: Urine, Clean Catch Updated: 11/29/20 1251     LEGIONELLA ANTIGEN, URINE Negative    Respiratory Panel PCR w/COVID-19(SARS-CoV-2) REUEBN/KRISTIAN/ELAYNE/PAD/COR/MAD/JAQUELIN In-House, NP Swab in UTM/VTM, 3-4 HR TAT - Swab, Nasopharynx [[id number]]  (Abnormal) Collected: 11/28/20 2238    Lab Status: Final result Specimen: Swab from Nasopharynx Updated: 11/29/20 0012     ADENOVIRUS, PCR Not Detected     Coronavirus 229E Not Detected     Coronavirus HKU1 Not Detected     Coronavirus NL63 Not Detected     Coronavirus OC43 Not Detected     COVID19 Detected     Human Metapneumovirus Not Detected     Human Rhinovirus/Enterovirus Not Detected     Influenza A PCR Not Detected     Influenza A H1 Not Detected     Influenza A H1 2009 PCR Not Detected     Influenza A H3 Not Detected     Influenza B PCR Not Detected     Parainfluenza Virus 1 Not Detected     Parainfluenza Virus 2 Not Detected     Parainfluenza Virus 3 Not Detected     Parainfluenza Virus 4 Not Detected     RSV, PCR Not Detected     Bordetella pertussis pcr Not Detected     Bordetella parapertussis PCR Not Detected      Chlamydophila pneumoniae PCR Not Detected     Mycoplasma pneumo by PCR Not Detected    Narrative:      Fact sheet for providers: https://docs.[employer]/wp-content/uploads/YSV5126-1931-BI9.1-EUA-Provider-Fact-Sheet-3.pdf    Fact sheet for patients: https://docs.[employer]/wp-content/uploads/VNH1157-4906-TA6.1-EUA-Patient-Fact-Sheet-1.pdf          Imaging Results (Last 24 Hours)     ** No results found for the last 24 hours. **               I have reviewed the medications:  Scheduled Meds:amLODIPine, 2.5 mg, Oral, Daily  dexamethasone, 6 mg, Oral, Daily With Breakfast    Or  dexamethasone, 6 mg, Intravenous, Daily With Breakfast  enoxaparin, 40 mg, Subcutaneous, Nightly  guaiFENesin, 600 mg, Oral, Q12H  losartan-HCTZ (HYZAAR) 50-12.5 combo dose, , Oral, Daily  oxybutynin XL, 5 mg, Oral, Daily  pantoprazole, 40 mg, Oral, QAM  polyethylene glycol, 17 g, Oral, Daily  sodium chloride, 10 mL, Intravenous, Q12H      Continuous Infusions:sodium chloride, 75 mL/hr, Last Rate: Stopped (12/02/20 0924)      PRN Meds:.•  albuterol sulfate HFA  •  albuterol sulfate HFA  •  benzonatate  •  sodium chloride  •  sodium chloride    Assessment/Plan   Assessment & Plan     Active Hospital Problems    Diagnosis  POA   • **Pneumonia due to COVID-19 virus [U07.1, J12.89]  Yes   • Elevated LFTs [R79.89]  Yes   • Leukopenia [D72.819]  Yes   • GERD (gastroesophageal reflux disease) [K21.9]  Yes   • Neuropathy [G62.9]  Yes   • Hyperlipidemia [E78.5]  Yes   • Hypertension [I10]  Yes   • Hypothyroidism [E03.9]  Yes      Resolved Hospital Problems   No resolved problems to display.        Brief Hospital Course to date:  Diane Denson is a 60 y.o. female with history of hypertension, hyperlipidemia, GERD, anxiety and depression who presented cough, myalgias, fatigue and shortness of breath particularly with exertion.  She also complained of chest pain with coughing and deep breathing.  Her spouse was recently admitted to this  [facility] for COVID-19 pneumonia.  They believe they contracted the virus from a coworker who tested +2 weeks ago.    COVID-19 pneumonia  -CT angiogram from admission showed scattered bilateral patchy groundglass infiltrates consistent with normal coronavirus infection  -Patient currently maintaining oxygen saturations greater than 94% on room air  -Remdesivir dose 5 of 5 scheduled today, LFTs elevated but improved compared to yesterday (less than 5x ULN presently) -- discussed with ELAINE DILLON to proceed with final dose today  -Dexamethasone 6 mg p.o., dose 4 of 10  -Albuterol nebs  -Currently on HCTZ but consider additional diuresis as needed  -CRP improved to 0.99 today, 1.86 at yesterday and 5.02 at admission.  Continue to monitor disease progression labs daily.    Pleuritic chest pain  -No PE on CTA done at admission  -Troponin x3 <0.01  -EKG normal sinus rhythm    HTN  -Continue Hyzaar and Norvasc    GERD  -PPI daily    Elevated LFTs  -As above, AST 70 and  and total bilirubin <0.2 by today's labs   -Suspect elevation due to a combination of ongoing COVID-19 infection as well as remdesivir infusion  -acute hepatitis panel negative  -Repeat LFTs in a.m.      DVT Prophylaxis: Lovenox      Disposition: I expect the patient to be discharged home likely Thursday    CODE STATUS:   Code Status and Medical Interventions:   Ordered at: 11/28/20 2001     Code Status:    CPR     Medical Interventions (Level of Support Prior to Arrest):    Full       Vinnie Mercer MD  12/02/20

## 2020-12-02 NOTE — PROGRESS NOTES
Continued Stay Note  [facility]     Patient Name: Diane Denson  MRN: [id number]  Today's Date: 12/2/2020    Admit Date: 11/28/2020    Discharge Plan     Row Name 12/02/20 1037       Plan    Plan  update    Patient/Family in Agreement with Plan  yes    Plan Comments  Per MD rounds, patient likely to discharge tomorrow.  Spoke with patient via telephone regarding discharge plan.  Patient was on 2LNC this morning but reports now that she is off of Oxygen and the monitor is reading 93%. No discharge needs verbalized, hoping to go home tomorrow.  CM following.  Patient plan is to discharge home via car with family to transport.    Final Discharge Disposition Code  01 - home or self-care        Discharge Codes    No documentation.             Anahi Paredes RN

## 2020-12-02 NOTE — PLAN OF CARE
Goal Outcome Evaluation:  Plan of Care Reviewed With: patient  Progress: improving  Outcome Summary: VSS. Pt has been on RA this afternoon and is satting in low 90s. She reported feeling better today. Will cont to monitor.        12/2/20  VSS. Pt had last dose of remdesivir today. Liver enzymes were elevated and fluids were given. She is on RA and satting in 90s. Her IV started leaking shortly after remdesivir, and Dr. Mercer agreed that it is okay to leave out since patient will more than likely be discharged tomorrow. Will cont to monitor.

## 2020-12-03 ENCOUNTER — READMISSION MANAGEMENT (OUTPATIENT)
Dept: CALL CENTER | Facility: HOSPITAL | Age: 60
End: 2020-12-03

## 2020-12-03 VITALS
RESPIRATION RATE: 16 BRPM | SYSTOLIC BLOOD PRESSURE: 139 MMHG | OXYGEN SATURATION: 96 % | DIASTOLIC BLOOD PRESSURE: 81 MMHG | BODY MASS INDEX: 33.79 KG/M2 | TEMPERATURE: 97.5 F | HEART RATE: 106 BPM | HEIGHT: 64 IN | WEIGHT: 197.9 LBS

## 2020-12-03 PROBLEM — D72.819 LEUKOPENIA: Status: RESOLVED | Noted: 2020-11-28 | Resolved: 2020-12-03

## 2020-12-03 PROBLEM — R79.89 ELEVATED LFTS: Status: RESOLVED | Noted: 2020-11-28 | Resolved: 2020-12-03

## 2020-12-03 PROBLEM — K21.9 GERD (GASTROESOPHAGEAL REFLUX DISEASE): Status: RESOLVED | Noted: 2020-11-28 | Resolved: 2020-12-03

## 2020-12-03 LAB
ALBUMIN SERPL-MCNC: 3.3 G/DL (ref 3.5–5.2)
ALBUMIN/GLOB SERPL: 1.1 G/DL
ALP SERPL-CCNC: 121 U/L (ref 39–117)
ALT SERPL W P-5'-P-CCNC: 108 U/L (ref 1–33)
ANION GAP SERPL CALCULATED.3IONS-SCNC: 10 MMOL/L (ref 5–15)
AST SERPL-CCNC: 31 U/L (ref 1–32)
BASOPHILS # BLD AUTO: 0.02 10*3/MM3 (ref 0–0.2)
BASOPHILS NFR BLD AUTO: 0.3 % (ref 0–1.5)
BILIRUB CONJ SERPL-MCNC: <0.2 MG/DL (ref 0–0.3)
BILIRUB SERPL-MCNC: 0.2 MG/DL (ref 0–1.2)
BUN SERPL-MCNC: 19 MG/DL (ref 8–23)
BUN/CREAT SERPL: 24.4 (ref 7–25)
CALCIUM SPEC-SCNC: 9.4 MG/DL (ref 8.6–10.5)
CHLORIDE SERPL-SCNC: 100 MMOL/L (ref 98–107)
CK SERPL-CCNC: 33 U/L (ref 20–180)
CO2 SERPL-SCNC: 26 MMOL/L (ref 22–29)
CREAT SERPL-MCNC: 0.78 MG/DL (ref 0.57–1)
CRP SERPL-MCNC: 0.55 MG/DL (ref 0–0.5)
D DIMER PPP FEU-MCNC: 0.5 MCGFEU/ML (ref 0–0.56)
DEPRECATED RDW RBC AUTO: 40.8 FL (ref 37–54)
EOSINOPHIL # BLD AUTO: 0.01 10*3/MM3 (ref 0–0.4)
EOSINOPHIL NFR BLD AUTO: 0.1 % (ref 0.3–6.2)
ERYTHROCYTE [DISTWIDTH] IN BLOOD BY AUTOMATED COUNT: 12.4 % (ref 12.3–15.4)
FERRITIN SERPL-MCNC: 203.4 NG/ML (ref 13–150)
FIBRINOGEN PPP-MCNC: 362 MG/DL (ref 215–430)
GFR SERPL CREATININE-BSD FRML MDRD: 91 ML/MIN/1.73
GLOBULIN UR ELPH-MCNC: 3.1 GM/DL
GLUCOSE SERPL-MCNC: 115 MG/DL (ref 65–99)
HCT VFR BLD AUTO: 40.8 % (ref 34–46.6)
HGB BLD-MCNC: 12.8 G/DL (ref 12–15.9)
IMM GRANULOCYTES # BLD AUTO: 0.14 10*3/MM3 (ref 0–0.05)
IMM GRANULOCYTES NFR BLD AUTO: 2 % (ref 0–0.5)
LDH SERPL-CCNC: 198 U/L (ref 135–214)
LYMPHOCYTES # BLD AUTO: 2.19 10*3/MM3 (ref 0.7–3.1)
LYMPHOCYTES NFR BLD AUTO: 31 % (ref 19.6–45.3)
MCH RBC QN AUTO: 28 PG (ref 26.6–33)
MCHC RBC AUTO-ENTMCNC: 31.4 G/DL (ref 31.5–35.7)
MCV RBC AUTO: 89.3 FL (ref 79–97)
MONOCYTES # BLD AUTO: 1.01 10*3/MM3 (ref 0.1–0.9)
MONOCYTES NFR BLD AUTO: 14.3 % (ref 5–12)
NEUTROPHILS NFR BLD AUTO: 3.69 10*3/MM3 (ref 1.7–7)
NEUTROPHILS NFR BLD AUTO: 52.3 % (ref 42.7–76)
NRBC BLD AUTO-RTO: 0 /100 WBC (ref 0–0.2)
PLATELET # BLD AUTO: 318 10*3/MM3 (ref 140–450)
PMV BLD AUTO: 10.3 FL (ref 6–12)
POTASSIUM SERPL-SCNC: 4.1 MMOL/L (ref 3.5–5.2)
PROT SERPL-MCNC: 6.4 G/DL (ref 6–8.5)
RBC # BLD AUTO: 4.57 10*6/MM3 (ref 3.77–5.28)
SODIUM SERPL-SCNC: 136 MMOL/L (ref 136–145)
WBC # BLD AUTO: 7.06 10*3/MM3 (ref 3.4–10.8)

## 2020-12-03 PROCEDURE — 85384 FIBRINOGEN ACTIVITY: CPT | Performed by: NURSE PRACTITIONER

## 2020-12-03 PROCEDURE — 82728 ASSAY OF FERRITIN: CPT | Performed by: NURSE PRACTITIONER

## 2020-12-03 PROCEDURE — 86140 C-REACTIVE PROTEIN: CPT | Performed by: NURSE PRACTITIONER

## 2020-12-03 PROCEDURE — 82248 BILIRUBIN DIRECT: CPT | Performed by: NURSE PRACTITIONER

## 2020-12-03 PROCEDURE — 63710000001 DEXAMETHASONE PER 0.25 MG: Performed by: NURSE PRACTITIONER

## 2020-12-03 PROCEDURE — 83615 LACTATE (LD) (LDH) ENZYME: CPT | Performed by: NURSE PRACTITIONER

## 2020-12-03 PROCEDURE — 85379 FIBRIN DEGRADATION QUANT: CPT | Performed by: NURSE PRACTITIONER

## 2020-12-03 PROCEDURE — 80053 COMPREHEN METABOLIC PANEL: CPT | Performed by: NURSE PRACTITIONER

## 2020-12-03 PROCEDURE — 99239 HOSP IP/OBS DSCHRG MGMT >30: CPT | Performed by: INTERNAL MEDICINE

## 2020-12-03 PROCEDURE — 85025 COMPLETE CBC W/AUTO DIFF WBC: CPT | Performed by: NURSE PRACTITIONER

## 2020-12-03 PROCEDURE — 82550 ASSAY OF CK (CPK): CPT | Performed by: NURSE PRACTITIONER

## 2020-12-03 RX ORDER — BENZONATATE 100 MG/1
100 CAPSULE ORAL 3 TIMES DAILY PRN
Qty: 30 CAPSULE | Refills: 0 | OUTPATIENT
Start: 2020-12-03 | End: 2021-04-05

## 2020-12-03 RX ORDER — DEXAMETHASONE 6 MG/1
6 TABLET ORAL
Qty: 5 TABLET | Refills: 0 | Status: SHIPPED | OUTPATIENT
Start: 2020-12-04 | End: 2020-12-09

## 2020-12-03 RX ORDER — AMLODIPINE BESYLATE 5 MG/1
5 TABLET ORAL DAILY
Qty: 30 TABLET | Refills: 5
Start: 2020-12-03 | End: 2022-09-19 | Stop reason: ALTCHOICE

## 2020-12-03 RX ORDER — GUAIFENESIN 600 MG/1
600 TABLET, EXTENDED RELEASE ORAL EVERY 12 HOURS SCHEDULED
Qty: 30 TABLET | Refills: 0 | Status: SHIPPED | OUTPATIENT
Start: 2020-12-03 | End: 2022-09-19

## 2020-12-03 RX ADMIN — GUAIFENESIN 600 MG: 600 TABLET, EXTENDED RELEASE ORAL at 09:41

## 2020-12-03 RX ADMIN — LOSARTAN POTASSIUM: 50 TABLET, FILM COATED ORAL at 09:40

## 2020-12-03 RX ADMIN — AMLODIPINE BESYLATE 2.5 MG: 2.5 TABLET ORAL at 09:40

## 2020-12-03 RX ADMIN — OXYBUTYNIN CHLORIDE 5 MG: 5 TABLET, FILM COATED, EXTENDED RELEASE ORAL at 09:41

## 2020-12-03 RX ADMIN — PANTOPRAZOLE SODIUM 40 MG: 40 TABLET, DELAYED RELEASE ORAL at 06:12

## 2020-12-03 RX ADMIN — POLYETHYLENE GLYCOL 3350 17 G: 17 POWDER, FOR SOLUTION ORAL at 09:41

## 2020-12-03 RX ADMIN — DEXAMETHASONE 6 MG: 2 TABLET ORAL at 09:40

## 2020-12-03 NOTE — OUTREACH NOTE
Prep Survey      Responses   [facility] [place] facility patient discharged from?  [place]   Is LACE score < 7 ?  No   Eligibility  [facility]   Date of Admission  11/28/20   Date of Discharge  12/03/20   Discharge Disposition  Home or Self Care   Discharge diagnosis  Pneumonia due to COVID-19 virus   Does the patient have one of the following disease processes/diagnoses(primary or secondary)?  COVID-19   Does the patient have Home health ordered?  No   Is there a DME ordered?  No   Prep survey completed?  Yes          Chery Sams RN

## 2020-12-03 NOTE — PLAN OF CARE
Goal Outcome Evaluation:  Plan of Care Reviewed With: patient  Progress: improving  Outcome Summary: Pt remains on room air. No complaints of soa. VSS. Will continue to monitor.

## 2020-12-03 NOTE — DISCHARGE SUMMARY
[facility] Medicine Services  DISCHARGE SUMMARY    Patient Name: Diane Denson  : 1960  MRN: [id number]    Date of Admission: 2020  1:55 PM  Date of Discharge:  20  Primary Care Physician: Cindy Rosa PA    Consults     No orders found from 10/30/2020 to 2020.          Hospital Course     Presenting Problem:   Pneumonia due to COVID-19 virus [U07.1, J12.89]  Pneumonia due to COVID-19 virus [U07.1, J12.89]    Active Hospital Problems    Diagnosis  POA   • **Pneumonia due to COVID-19 virus [U07.1, J12.89]  Yes   • GERD (gastroesophageal reflux disease) [K21.9]  Yes   • Neuropathy [G62.9]  Yes   • Hyperlipidemia [E78.5]  Yes   • Hypertension [I10]  Yes   • Hypothyroidism [E03.9]  Yes   • Sarcoidosis [D86.9]  Yes      Resolved Hospital Problems    Diagnosis Date Resolved POA   • Elevated LFTs [R79.89] 2020 Yes   • Leukopenia [D72.819] 2020 Yes          Hospital Course:  Diane Dneson is a 60 y.o. female with history of hypertension, hyperlipidemia, GERD, anxiety and depression who presented cough, myalgias, fatigue and shortness of breath particularly with exertion.  She also complained of chest pain with coughing and deep breathing.  Her spouse was recently admitted to this hospital for COVID-19 pneumonia.  They believe they contracted the virus from a coworker who tested +2 weeks ago.     COVID-19 pneumonia  -CT angiogram from admission showed scattered bilateral patchy groundglass infiltrates consistent with normal coronavirus infection  - She completed 5 days of remdesivir and will complete the remainin 5 days of dexamethasone at home to complete a total of 10 days  - Her O2 sats remained good and was on room air prior to discharge   - her VTE risk was 1 due to age >60 with a normal d-dimer x 2 days so decision was made not to discharge on VTE prophylaxis   - Overall disease progression labs continued to improve on discharge    - Reviewed quarantine recommendations with patient      Pleuritic chest pain  -No PE on CTA done at admission; overall improved     HTN  -Continued Hyzaar; norvasc dose was reduced -- will continue 5 mg daily on discharge      GERD  -PPI daily; continue home meds on discharge      Elevated LFTs  - LFTs trended up to AST 70 and  and total bilirubin <0.2. trended down and close to normal on day of discharge. Likely a combination of COVID and remdesivir. Hepatitis panel was negative         Discharge Follow Up Recommendations for outpatient labs/diagnostics:  PCP Cindy Rosa 1-2 weeks     Day of Discharge     HPI:   States she is feeling ok today. Ready to go home. Reviewed recent labs and quarantine on discharge. States she is feeling good and agreeable for discharge.     Review of Systems  Gen- No fevers, chills  CV- No chest pain, palpitations  Resp- No cough, dyspnea  GI- No N/V/D, abd pain    Vital Signs:   Temp:  [96.6 °F (35.9 °C)-97.5 °F (36.4 °C)] 97.5 °F (36.4 °C)  Heart Rate:  [67-91] 78  Resp:  [16-18] 16  BP: (114-147)/(76-88) 139/81     Physical Exam:  With patient's consent, physical exam was conducted via visual telemedicine encounter due to patient's current isolation requirements in the interest of PPE conservation.    Constitutional: No acute distress, awake, alert, nontoxic, normal body habitus  HENT: NCAT, MMM, no conjunctival injection  Respiratory: Good effort, nonlabored respirations   Cardiovascular:  tele with NSR  Musculoskeletal: No edema, normal muscle tone and mass for age  Psychiatric: Appropriate affect, good insight and judgement, cooperative  Neurologic: Oriented x 3, movements symmetric BUE and BLE, speech clear and fluent  Skin: No visible rashes, no jaundice seen on exposed skin through window      Pertinent  and/or Most Recent Results     Results from last 7 days   Lab Units 12/03/20  1152 12/02/20  0535 12/01/20  0936 11/30/20  0716 11/29/20  0634 11/28/20  1408   WBC  10*3/mm3 7.06 6.14 5.03 2.06* 2.47* 2.88*   HEMOGLOBIN g/dL 12.8 12.2 12.4 13.3 12.9 13.7   HEMATOCRIT % 40.8 38.8 40.4 41.6 40.5 43.6   PLATELETS 10*3/mm3 318 285 273 224 205 200   SODIUM mmol/L 136 138 138 136 137 138   POTASSIUM mmol/L 4.1 4.5 4.6 4.0 3.5 3.8   CHLORIDE mmol/L 100 104 103 100 100 100   CO2 mmol/L 26.0 25.0 26.0 25.0 27.0 25.0   BUN mg/dL 19 17 20 17 11 13   CREATININE mg/dL 0.78 0.80 0.71 0.81 0.83 0.92   GLUCOSE mg/dL 115* 159* 112* 105* 89 90   CALCIUM mg/dL 9.4 8.4* 8.9 9.2 8.5* 8.7     Results from last 7 days   Lab Units 12/03/20  1152 12/02/20  0535 12/01/20  0936 11/30/20  0716 11/29/20  0634 11/28/20  1408   BILIRUBIN mg/dL 0.2 <0.2 0.2 0.3 0.3 0.4   ALK PHOS U/L 121* 131* 136* 149* 149* 157*   ALT (SGPT) U/L 108* 147* 174* 46* 42* 54*   AST (SGOT) U/L 31 70* 157* 31 30 38*   PROTIME Seconds  --  15.1*  --   --   --   --    INR   --  1.22*  --   --   --   --            Invalid input(s): TG  Results from last 7 days   Lab Units 11/29/20  0634 11/28/20  1555 11/28/20  1408   PROBNP pg/mL  --   --  22.5   TROPONIN T ng/mL <0.010 <0.010 <0.010   PROCALCITONIN ng/mL  --   --  0.05   LACTATE mmol/L  --   --  1.0       Brief Urine Lab Results  (Last result in the past 365 days)      Color   Clarity   Blood   Leuk Est   Nitrite   Protein   CREAT   Urine HCG        04/03/20 1043 Yellow Clear Negative Moderate (2+) Negative Negative               Microbiology Results Abnormal     Procedure Component Value - Date/Time    S. Pneumo Ag Urine or CSF - Urine, Urine, Clean Catch [[id number]]  (Normal) Collected: 11/28/20 2238    Lab Status: Final result Specimen: Urine, Clean Catch Updated: 11/29/20 1251     Strep Pneumo Ag Negative    Legionella Antigen, Urine - Urine, Urine, Clean Catch [[id number]]  (Normal) Collected: 11/28/20 2238    Lab Status: Final result Specimen: Urine, Clean Catch Updated: 11/29/20 1251     LEGIONELLA ANTIGEN, URINE Negative    Respiratory Panel PCR w/COVID-19(SARS-CoV-2)  REUBEN/KRISTIAN/ELAYNE/PAD/COR/MAD/JAQUELIN In-House, NP Swab in UTM/VTM, 3-4 HR TAT - Swab, Nasopharynx [[id number]]  (Abnormal) Collected: 11/28/20 2238    Lab Status: Final result Specimen: Swab from Nasopharynx Updated: 11/29/20 0012     ADENOVIRUS, PCR Not Detected     Coronavirus 229E Not Detected     Coronavirus HKU1 Not Detected     Coronavirus NL63 Not Detected     Coronavirus OC43 Not Detected     COVID19 Detected     Human Metapneumovirus Not Detected     Human Rhinovirus/Enterovirus Not Detected     Influenza A PCR Not Detected     Influenza A H1 Not Detected     Influenza A H1 2009 PCR Not Detected     Influenza A H3 Not Detected     Influenza B PCR Not Detected     Parainfluenza Virus 1 Not Detected     Parainfluenza Virus 2 Not Detected     Parainfluenza Virus 3 Not Detected     Parainfluenza Virus 4 Not Detected     RSV, PCR Not Detected     Bordetella pertussis pcr Not Detected     Bordetella parapertussis PCR Not Detected     Chlamydophila pneumoniae PCR Not Detected     Mycoplasma pneumo by PCR Not Detected    Narrative:      Fact sheet for providers: https://docs.[employer]/wp-content/uploads/QTX4729-9801-GR7.1-EUA-Provider-Fact-Sheet-3.pdf    Fact sheet for patients: https://docs.[employer]/wp-content/uploads/CEM0255-0018-RG6.1-EUA-Patient-Fact-Sheet-1.pdf          Imaging Results (All)     Procedure Component Value Units Date/Time    CT Angiogram Chest [[id number]] Collected: 11/28/20 1703     Updated: 11/28/20 1708    Narrative:      EXAMINATION: CT ANGIOGRAM CHEST-      INDICATION: PE protocol - covid, dyspnea, hypoxia, chest pain     TECHNIQUE: Axial pulmonary arterial phase IV contrast enhanced CT  angiogram of the chest per PE protocol. Two-dimensional reconstructions  were postprocessed.     The radiation dose reduction device was turned on for each scan per the  ALARA (As Low as Reasonably Achievable) protocol.     COMPARISON: 7/17/2015     FINDINGS: No pathologic axillary adenopathy or other  worrisome body wall  soft tissue finding. No acute findings in the partially imaged upper  abdomen. No pathologic mediastinal or hilar adenopathy. Normal caliber  thoracic aorta. No acute fracture or aggressive osseous lesion.  Evaluation of the pulmonary arteries demonstrates no filling defects  concerning for acute pulmonary emboli. Evaluation of the lung parenchyma  demonstrates some patchy peripheral areas of groundglass opacity, mild  but typical in appearance for Covid 19 related pneumonia. No pleural or  pericardial effusion.       Impression:      No acute pulmonary embolus.     Evaluation of the lung parenchyma demonstrates some patchy peripheral  areas of groundglass opacity, mild but typical in appearance for Covid  19 related pneumonia.        This report was finalized on 11/28/2020 5:05 PM by Magen Pierson.             Results for orders placed during the hospital encounter of 09/18/17   Doppler Arterial Multi Level Lower Extremity - Bilateral CAR    Narrative · Right Conclusion: The right IVÁN is normal. The test is negative for   exercise induced claudication.  · Left Conclusion: The left IVÁN is normal. The test is negative for   exercise induced claudication.          Results for orders placed during the hospital encounter of 09/18/17   Doppler Arterial Multi Level Lower Extremity - Bilateral CAR    Narrative · Right Conclusion: The right IVÁN is normal. The test is negative for   exercise induced claudication.  · Left Conclusion: The left IVÁN is normal. The test is negative for   exercise induced claudication.               Plan for Follow-up of Pending Labs/Results:     Discharge Details        Discharge Medications      New Medications      Instructions Start Date   benzonatate 100 MG capsule  Commonly known as: TESSALON   100 mg, Oral, 3 Times Daily PRN      dexamethasone 6 MG tablet  Commonly known as: DECADRON   6 mg, Oral, Daily With Breakfast   Start Date: December 4, 2020     guaiFENesin  600 MG 12 hr tablet  Commonly known as: MUCINEX   600 mg, Oral, Every 12 Hours Scheduled         Changes to Medications      Instructions Start Date   amLODIPine 5 MG tablet  Commonly known as: NORVASC  What changed:   · how much to take  · Another medication with the same name was removed. Continue taking this medication, and follow the directions you see here.   5 mg, Oral, Daily         Continue These Medications      Instructions Start Date   albuterol sulfate  (90 Base) MCG/ACT inhaler  Commonly known as: ProAir HFA   2 puffs, Inhalation, Every 4 Hours PRN      amitriptyline 25 MG tablet  Commonly known as: ELAVIL   Take 1-2 tablets at night as needed.      cyclobenzaprine 10 MG tablet  Commonly known as: FLEXERIL   10 mg, Oral, 3 Times Daily PRN      losartan-hydrochlorothiazide 50-12.5 MG per tablet  Commonly known as: HYZAAR   TAKE 1 TABLET BY MOUTH EVERY DAY      omeprazole 40 MG capsule  Commonly known as: priLOSEC   1 capsule by mouth twice daily      oxybutynin XL 5 MG 24 hr tablet  Commonly known as: Ditropan XL   5 mg, Oral, Daily      polyethylene glycol 17 g packet  Commonly known as: MIRALAX   17 g, Oral, Daily             No Known Allergies      Discharge Disposition:  Home or Self Care    Diet:  Hospital:  Diet Order   Procedures   • Diet Regular; Cardiac       Activity:  Activity Instructions     Activity as Tolerated            Restrictions or Other Recommendations:         CODE STATUS:    Code Status and Medical Interventions:   Ordered at: 11/28/20 2001     Code Status:    CPR     Medical Interventions (Level of Support Prior to Arrest):    Full       Future Appointments   Date Time Provider Department Center   3/5/2021 10:45 AM Cindy Rosa PA [facility]       Additional Instructions for the Follow-ups that You Need to Schedule     Discharge Follow-up with PCP   As directed       Currently Documented PCP:    Cindy Rosa PA    PCP Phone Number:    [phone]     Follow Up  Details: PCP Cindy Rosa 1-2 weeks - telehealth visit                     Katie Matthews DO  12/03/20      Time Spent on Discharge:  I spent  33  minutes on this discharge activity which included: face-to-face encounter with the patient, reviewing the data in the system, coordination of the care with the nursing staff as well as consultants, documentation, and entering orders.

## 2020-12-04 ENCOUNTER — TRANSITIONAL CARE MANAGEMENT TELEPHONE ENCOUNTER (OUTPATIENT)
Dept: CALL CENTER | Facility: HOSPITAL | Age: 60
End: 2020-12-04

## 2020-12-04 NOTE — OUTREACH NOTE
Call Center TCM Note      Responses   [facility] patient discharged from?  [facility]   Does the patient have one of the following disease processes/diagnoses(primary or secondary)?  COVID-19   COVID-19 underlying condition?  None   TCM attempt successful?  No [verbal release is over a year old]   Unsuccessful attempts  Attempt 1   Discharge diagnosis  Pneumonia due to COVID-19 virus          Sudha Mitchell RN    12/4/2020, 08:38 EST

## 2020-12-04 NOTE — OUTREACH NOTE
Call Center TCM Note      Responses   [facility] patient discharged from?  [place]   Does the patient have one of the following disease processes/diagnoses(primary or secondary)?  COVID-19   COVID-19 underlying condition?  None   TCM attempt successful?  Yes   Call start time  0941   Call end time  0945   Discharge diagnosis  Pneumonia due to COVID-19 virus   Meds reviewed with patient/caregiver?  Yes   Is the patient having any side effects they believe may be caused by any medication additions or changes?  No   Does the patient have all medications ordered at discharge?  Yes   Is the patient taking all medications as directed (includes completed medication regime)?  Yes   Does the patient have a primary care provider?   Yes   Comments regarding PCP  Hospital d/c f/u appt is on 12/15/20 at 2:15 pm Patricia Garcia   Does the patient have an appointment with their PCP or specialist within 7 days of discharge?  Greater than 7 days   What is preventing the patient from scheduling follow up appointments within 7 days of discharge?  -- [Pt tested positive with COVID on 11/28/20]   Nursing Interventions  Verified appointment date/time/provider   Has the patient kept scheduled appointments due by today?  N/A   Psychosocial issues?  No   Did the patient receive a copy of their discharge instructions?  Yes   Did the patient receive a copy of COVID-19 specific instructions?  Yes   Nursing interventions  Reviewed instructions with patient   What is the patient's perception of their health status since discharge?  Same   Does the patient have any of the following symptoms?  None   Nursing Interventions  Nurse provided patient education   Pulse Ox monitoring  Intermittent   Pulse Ox device source  Patient   O2 Sat comments  96% on RA    O2 Sat: education provided  Sat levels   Is the patient/caregiver able to teach back steps to recovery at home?  Rest and rebuild strength, gradually increase activity, Eat a well-balance  diet   If the patient is a current smoker, are they able to teach back resources for cessation?  Not a smoker   Is the patient/caregiver able to teach back the hierarchy of who to call/visit for symptoms/problems? PCP, Specialist, Home health nurse, Urgent Care, ED, 911  Yes   TCM call completed?  Yes          Sudha Mitchell RN    12/4/2020, 09:47 EST

## 2020-12-05 ENCOUNTER — READMISSION MANAGEMENT (OUTPATIENT)
Dept: CALL CENTER | Facility: HOSPITAL | Age: 60
End: 2020-12-05

## 2020-12-05 NOTE — OUTREACH NOTE
COVID-19 Week 1 Survey      Responses   [facility] patient discharged from?  [place]   Does the patient have one of the following disease processes/diagnoses(primary or secondary)?  COVID-19   COVID-19 underlying condition?  None   Call Number  Call 2   Discharge diagnosis  Pneumonia due to COVID-19 virus          Janna Bhatt RN

## 2020-12-10 ENCOUNTER — READMISSION MANAGEMENT (OUTPATIENT)
Dept: CALL CENTER | Facility: HOSPITAL | Age: 60
End: 2020-12-10

## 2020-12-10 NOTE — OUTREACH NOTE
COVID-19 Week 1 Survey      Responses   [facility] patient discharged from?  [place]   Does the patient have one of the following disease processes/diagnoses(primary or secondary)?  COVID-19   COVID-19 underlying condition?  None   Call Number  Call 4   Week 1 Call successful?  No   Discharge diagnosis  Pneumonia due to COVID-19 virus          Cherelle Lamb RN

## 2020-12-13 ENCOUNTER — READMISSION MANAGEMENT (OUTPATIENT)
Dept: CALL CENTER | Facility: HOSPITAL | Age: 60
End: 2020-12-13

## 2020-12-13 NOTE — OUTREACH NOTE
COVID-19 Week 2 Survey      Responses   [facility] patient discharged from?  [place]   Does the patient have one of the following disease processes/diagnoses(primary or secondary)?  COVID-19   COVID-19 underlying condition?  None   Call Number  Call 1   COVID-19 Week 2: Call 1 attempt successful?  Yes   Call start time  1315   Call end time  1316   Discharge diagnosis  Pneumonia due to COVID-19 virus   Meds reviewed with patient/caregiver?  Yes   Is the patient taking all medications as directed (includes completed medication regime)?  Yes   Comments regarding PCP  Hospital d/c f/u appt is on 12/15/20 at 2:15 pm Patricia Garcia   Has the patient kept scheduled appointments due by today?  N/A   What is the patient's perception of their health status since discharge?  Improving   Does the patient have any of the following symptoms?  Shortness of breath, Cough   Nursing Interventions  Nurse provided patient education   Pulse Ox monitoring  Intermittent   Pulse Ox device source  Patient   O2 Sat comments  96% on RA    Is the patient/caregiver able to teach back steps to recovery at home?  Rest and rebuild strength, gradually increase activity, Eat a well-balance diet   COVID-19 call completed?  Yes          Sudha Mitchell RN

## 2020-12-14 ENCOUNTER — TELEPHONE (OUTPATIENT)
Dept: INTERNAL MEDICINE | Facility: CLINIC | Age: 60
End: 2020-12-14

## 2020-12-14 NOTE — TELEPHONE ENCOUNTER
PATIENT HAS A HFU VISIT TOMORROW WITH ARTURO AND SHE STILL HAS COVD SYMPTOMS BUT IS WORRIED ABOUT HER LIVER ENZYMES THAT NEEDED TO BE CHECKED; PLEASE CALL TO ADVISE    DENZEL: [phone]; MAILBOX IS FULL, BUT SHE SAID SHE WILL CALL YOU BACK

## 2020-12-15 ENCOUNTER — TELEMEDICINE (OUTPATIENT)
Dept: INTERNAL MEDICINE | Facility: CLINIC | Age: 60
End: 2020-12-15

## 2020-12-15 DIAGNOSIS — U07.1 COVID-19: Primary | ICD-10-CM

## 2020-12-15 DIAGNOSIS — R74.8 ELEVATED LIVER ENZYMES: ICD-10-CM

## 2020-12-15 LAB
QT INTERVAL: 332 MS
QT INTERVAL: 366 MS
QTC INTERVAL: 417 MS
QTC INTERVAL: 437 MS

## 2020-12-15 PROCEDURE — 99495 TRANSJ CARE MGMT MOD F2F 14D: CPT | Performed by: PHYSICIAN ASSISTANT

## 2020-12-15 NOTE — PROGRESS NOTES
Transitional Care Follow Up Visit  Subjective     Diane Denson is a 60 y.o. female who presents for a transitional care management visit.    Within 48 business hours after discharge our office contacted her via telephone to coordinate her care and needs.      I reviewed and discussed the details of that call along with the discharge summary, hospital problems, inpatient lab results, inpatient diagnostic studies, and consultation reports with Diane.     Current outpatient and discharge medications have been reconciled for the patient.  Reviewed by: JONE Sultana      Date of TCM Phone Call 12/3/2020   [facility]   Date of Admission 11/28/2020   Date of Discharge 12/3/2020   Discharge Disposition Home or Self Care     Risk for Readmission (LACE) Score: 7 (12/3/2020  6:00 AM)    This was an audio and video enabled telemedicine encounter.    History of Present Illness     Pt was discharged on 12/3 after admission for Covid. She is improving but continues to have fatigue. Still has shortness of breath with walking from one end of the house to the other. Feels tight in her chest and wheezy. Uses inhaler a couple times a day. Has had continued intermittent fever, last elevated temp was 2 days ago. She has completed course of dexamethasone. Does not have pulse ox to monitor O2 sats.    She has taken off through the New Year.      Course During Hospital Stay:      Diane Denson is a 60 y.o. female with history of hypertension, hyperlipidemia, GERD, anxiety and depression who presented cough, myalgias, fatigue and shortness of breath particularly with exertion.  She also complained of chest pain with coughing and deep breathing.  Her spouse was recently admitted to this hospital for COVID-19 pneumonia.  They believe they contracted the virus from a coworker who tested +2 weeks ago.     COVID-19 pneumonia  -CT angiogram from admission showed scattered bilateral patchy groundglass  infiltrates consistent with normal coronavirus infection  - She completed 5 days of remdesivir and will complete the remainin 5 days of dexamethasone at home to complete a total of 10 days  - Her O2 sats remained good and was on room air prior to discharge   - her VTE risk was 1 due to age >60 with a normal d-dimer x 2 days so decision was made not to discharge on VTE prophylaxis   - Overall disease progression labs continued to improve on discharge   - Reviewed quarantine recommendations with patient      Pleuritic chest pain  -No PE on CTA done at admission; overall improved      HTN  -Continued Hyzaar; norvasc dose was reduced -- will continue 5 mg daily on discharge      GERD  -PPI daily; continue home meds on discharge      Elevated LFTs  - LFTs trended up to AST 70 and  and total bilirubin <0.2. trended down and close to normal on day of discharge. Likely a combination of COVID and remdesivir. Hepatitis panel was negative      The following portions of the patient's history were reviewed and updated as appropriate: allergies, current medications, past family history, past medical history, past social history, past surgical history and problem list.    Review of Systems   Constitutional: Positive for fatigue and fever. Negative for activity change and appetite change.   HENT: Negative for congestion and rhinorrhea.    Respiratory: Positive for cough and shortness of breath. Negative for chest tightness and wheezing.    Cardiovascular: Negative for chest pain and palpitations.   Gastrointestinal: Negative for abdominal pain.   Genitourinary: Negative for dysuria.   Musculoskeletal: Negative for arthralgias and myalgias.   Neurological: Negative for dizziness, weakness, light-headedness and headaches.   Psychiatric/Behavioral: Negative for dysphoric mood. The patient is not nervous/anxious.        Objective      Physical Exam  Constitutional:       Appearance: She is well-developed.   HENT:      Head:  Normocephalic and atraumatic.      Right Ear: External ear normal.      Left Ear: External ear normal.      Nose: Nose normal.   Eyes:      Conjunctiva/sclera: Conjunctivae normal.   Neck:      Musculoskeletal: Normal range of motion.   Cardiovascular:      Rate and Rhythm: Normal rate.   Pulmonary:      Effort: Pulmonary effort is normal.   Musculoskeletal: Normal range of motion.   Neurological:      Mental Status: She is alert and oriented to person, place, and time.   Psychiatric:         Behavior: Behavior normal.         Thought Content: Thought content normal.         Judgment: Judgment normal.         Assessment/Plan   Problems Addressed this Visit     None      Visit Diagnoses     COVID-19    -  Primary    Continued fatigue but steadily improving. Monitor for any worsening shortness of breath, continue progressing activity as tolerated. Call with concerns.    Relevant Orders    Pulse Oximetry Device    Elevated liver enzymes        Pt will stop by the office for recheck of liver enzymes when she has been afebrile for at least 3 days.    Relevant Orders    Comprehensive Metabolic Panel    Ferritin      Diagnoses       Codes Comments    COVID-19    -  Primary ICD-10-CM: U07.1  ICD-9-CM: 079.89 Continued fatigue but steadily improving. Monitor for any worsening shortness of breath, continue progressing activity as tolerated. Call with concerns.    Elevated liver enzymes     ICD-10-CM: R74.8  ICD-9-CM: 790.5 Pt will stop by the office for recheck of liver enzymes when she has been afebrile for at least 3 days.          Return for Next scheduled follow up.

## 2020-12-19 ENCOUNTER — HOSPITAL ENCOUNTER (EMERGENCY)
Facility: HOSPITAL | Age: 60
Discharge: HOME | End: 2020-12-19
Attending: EMERGENCY MEDICINE | Admitting: EMERGENCY MEDICINE

## 2020-12-19 ENCOUNTER — APPOINTMENT (OUTPATIENT)
Dept: CT IMAGING | Facility: HOSPITAL | Age: 60
End: 2020-12-19

## 2020-12-19 VITALS
HEART RATE: 87 BPM | DIASTOLIC BLOOD PRESSURE: 87 MMHG | OXYGEN SATURATION: 92 % | HEIGHT: 63 IN | BODY MASS INDEX: 34.02 KG/M2 | SYSTOLIC BLOOD PRESSURE: 153 MMHG | RESPIRATION RATE: 20 BRPM | WEIGHT: 192 LBS | TEMPERATURE: 97.5 F

## 2020-12-19 DIAGNOSIS — J98.01 BRONCHOSPASM: Primary | ICD-10-CM

## 2020-12-19 DIAGNOSIS — Z86.16 HISTORY OF 2019 NOVEL CORONAVIRUS DISEASE (COVID-19): ICD-10-CM

## 2020-12-19 DIAGNOSIS — J20.9 ACUTE BRONCHITIS, UNSPECIFIED ORGANISM: ICD-10-CM

## 2020-12-19 LAB
ALBUMIN SERPL-MCNC: 3.7 G/DL (ref 3.5–5.2)
ALBUMIN/GLOB SERPL: 1 G/DL
ALP SERPL-CCNC: 133 U/L (ref 39–117)
ALT SERPL W P-5'-P-CCNC: 23 U/L (ref 1–33)
ANION GAP SERPL CALCULATED.3IONS-SCNC: 10 MMOL/L (ref 5–15)
AST SERPL-CCNC: 16 U/L (ref 1–32)
BASOPHILS # BLD AUTO: 0.01 10*3/MM3 (ref 0–0.2)
BASOPHILS NFR BLD AUTO: 0.2 % (ref 0–1.5)
BILIRUB SERPL-MCNC: 0.2 MG/DL (ref 0–1.2)
BUN SERPL-MCNC: 12 MG/DL (ref 8–23)
BUN/CREAT SERPL: 14.3 (ref 7–25)
CALCIUM SPEC-SCNC: 9 MG/DL (ref 8.6–10.5)
CHLORIDE SERPL-SCNC: 102 MMOL/L (ref 98–107)
CO2 SERPL-SCNC: 25 MMOL/L (ref 22–29)
CREAT SERPL-MCNC: 0.84 MG/DL (ref 0.57–1)
D-LACTATE SERPL-SCNC: 1.9 MMOL/L (ref 0.5–2)
DEPRECATED RDW RBC AUTO: 47.1 FL (ref 37–54)
EOSINOPHIL # BLD AUTO: 0.28 10*3/MM3 (ref 0–0.4)
EOSINOPHIL NFR BLD AUTO: 6.7 % (ref 0.3–6.2)
ERYTHROCYTE [DISTWIDTH] IN BLOOD BY AUTOMATED COUNT: 14.1 % (ref 12.3–15.4)
GFR SERPL CREATININE-BSD FRML MDRD: 83 ML/MIN/1.73
GLOBULIN UR ELPH-MCNC: 3.8 GM/DL
GLUCOSE SERPL-MCNC: 91 MG/DL (ref 65–99)
HCT VFR BLD AUTO: 38.8 % (ref 34–46.6)
HGB BLD-MCNC: 11.9 G/DL (ref 12–15.9)
HOLD SPECIMEN: NORMAL
HOLD SPECIMEN: NORMAL
IMM GRANULOCYTES # BLD AUTO: 0.02 10*3/MM3 (ref 0–0.05)
IMM GRANULOCYTES NFR BLD AUTO: 0.5 % (ref 0–0.5)
LYMPHOCYTES # BLD AUTO: 1.19 10*3/MM3 (ref 0.7–3.1)
LYMPHOCYTES NFR BLD AUTO: 28.4 % (ref 19.6–45.3)
MCH RBC QN AUTO: 28.5 PG (ref 26.6–33)
MCHC RBC AUTO-ENTMCNC: 30.7 G/DL (ref 31.5–35.7)
MCV RBC AUTO: 93 FL (ref 79–97)
MONOCYTES # BLD AUTO: 0.6 10*3/MM3 (ref 0.1–0.9)
MONOCYTES NFR BLD AUTO: 14.3 % (ref 5–12)
NEUTROPHILS NFR BLD AUTO: 2.09 10*3/MM3 (ref 1.7–7)
NEUTROPHILS NFR BLD AUTO: 49.9 % (ref 42.7–76)
NRBC BLD AUTO-RTO: 0 /100 WBC (ref 0–0.2)
NT-PROBNP SERPL-MCNC: 91.3 PG/ML (ref 0–900)
PLATELET # BLD AUTO: 224 10*3/MM3 (ref 140–450)
PMV BLD AUTO: 9.5 FL (ref 6–12)
POTASSIUM SERPL-SCNC: 3.9 MMOL/L (ref 3.5–5.2)
PROT SERPL-MCNC: 7.5 G/DL (ref 6–8.5)
RBC # BLD AUTO: 4.17 10*6/MM3 (ref 3.77–5.28)
SODIUM SERPL-SCNC: 137 MMOL/L (ref 136–145)
TROPONIN T SERPL-MCNC: <0.01 NG/ML (ref 0–0.03)
WBC # BLD AUTO: 4.19 10*3/MM3 (ref 3.4–10.8)
WHOLE BLOOD HOLD SPECIMEN: NORMAL
WHOLE BLOOD HOLD SPECIMEN: NORMAL

## 2020-12-19 PROCEDURE — 0 IOPAMIDOL PER 1 ML: Performed by: EMERGENCY MEDICINE

## 2020-12-19 PROCEDURE — 99284 EMERGENCY DEPT VISIT MOD MDM: CPT

## 2020-12-19 PROCEDURE — 71275 CT ANGIOGRAPHY CHEST: CPT

## 2020-12-19 PROCEDURE — 83605 ASSAY OF LACTIC ACID: CPT | Performed by: EMERGENCY MEDICINE

## 2020-12-19 PROCEDURE — 84484 ASSAY OF TROPONIN QUANT: CPT | Performed by: EMERGENCY MEDICINE

## 2020-12-19 PROCEDURE — 83880 ASSAY OF NATRIURETIC PEPTIDE: CPT | Performed by: EMERGENCY MEDICINE

## 2020-12-19 PROCEDURE — 85025 COMPLETE CBC W/AUTO DIFF WBC: CPT | Performed by: EMERGENCY MEDICINE

## 2020-12-19 PROCEDURE — 25010000002 DEXAMETHASONE SODIUM PHOSPHATE 100 MG/10ML SOLUTION: Performed by: PHYSICIAN ASSISTANT

## 2020-12-19 PROCEDURE — 93005 ELECTROCARDIOGRAM TRACING: CPT | Performed by: EMERGENCY MEDICINE

## 2020-12-19 PROCEDURE — 96374 THER/PROPH/DIAG INJ IV PUSH: CPT

## 2020-12-19 PROCEDURE — 94640 AIRWAY INHALATION TREATMENT: CPT

## 2020-12-19 PROCEDURE — 80053 COMPREHEN METABOLIC PANEL: CPT | Performed by: EMERGENCY MEDICINE

## 2020-12-19 RX ORDER — PREDNISONE 20 MG/1
TABLET ORAL
Qty: 18 TABLET | Refills: 0 | Status: SHIPPED | OUTPATIENT
Start: 2020-12-19 | End: 2021-01-07

## 2020-12-19 RX ORDER — ALBUTEROL SULFATE 90 UG/1
2 AEROSOL, METERED RESPIRATORY (INHALATION) EVERY 4 HOURS PRN
Qty: 18 G | Refills: 0 | Status: SHIPPED | OUTPATIENT
Start: 2020-12-19 | End: 2022-09-19 | Stop reason: SDUPTHER

## 2020-12-19 RX ORDER — ALBUTEROL SULFATE 90 UG/1
6 AEROSOL, METERED RESPIRATORY (INHALATION) ONCE
Status: COMPLETED | OUTPATIENT
Start: 2020-12-19 | End: 2020-12-19

## 2020-12-19 RX ORDER — SODIUM CHLORIDE 0.9 % (FLUSH) 0.9 %
10 SYRINGE (ML) INJECTION AS NEEDED
Status: DISCONTINUED | OUTPATIENT
Start: 2020-12-19 | End: 2020-12-19 | Stop reason: HOSPADM

## 2020-12-19 RX ADMIN — DEXAMETHASONE SODIUM PHOSPHATE 10 MG: 10 INJECTION, SOLUTION INTRAMUSCULAR; INTRAVENOUS at 09:50

## 2020-12-19 RX ADMIN — ALBUTEROL SULFATE 6 PUFF: 90 AEROSOL, METERED RESPIRATORY (INHALATION) at 10:02

## 2020-12-19 RX ADMIN — IOPAMIDOL 75 ML: 755 INJECTION, SOLUTION INTRAVENOUS at 11:02

## 2020-12-19 NOTE — ED PROVIDER NOTES
Subjective   Ms. Mark 58-year-old female was diagnosed with Covid pneumonia back around November 24th.  States she was asked in the hospital for several days she was discharged states that she was better when she was discharged but never got back to her normal baseline.  She has underlying history of mild bronchitis.   has not smoked in years.  She is noticed over the past 2 to 3 days been having increasing shortness of breath.  States is worse with exertion she had quite a bit of wheezing.  She does have an inhaler at home she been using it does get relief from it.  Also been 2 doses of steroids in the past.  Said no fevers no chills or cough but nonproductive but has been barking it somewhat nothing seems to exacerbate or alleviate it.  She does use inhalers from time to time but not on a daily basis.      History provided by:  Patient   used: No    Shortness of Breath  Severity:  Moderate  Onset quality:  Gradual  Duration:  3 days  Timing:  Constant  Progression:  Worsening  Chronicity:  New  Context comment:  Diagnosed with Covid at the end of November hospitalized for several days did improve prior to discharge got worse over the past 3 days.  Relieved by:  Inhaler  Worsened by:  Coughing  Ineffective treatments:  None tried  Associated symptoms: no abdominal pain, no chest pain, no cough, no diaphoresis, no ear pain, no fever, no headaches, no hemoptysis, no neck pain, no rash, no sputum production, no swollen glands, no vomiting and no wheezing    Risk factors: no recent alcohol use, no family hx of DVT, no hx of PE/DVT, no obesity, no prolonged immobilization, no recent surgery and no tobacco use        Review of Systems   Constitutional: Negative for diaphoresis and fever.   HENT: Negative for ear pain.    Respiratory: Positive for shortness of breath. Negative for cough, hemoptysis, sputum production, chest tightness and wheezing.    Cardiovascular: Negative for chest pain  and palpitations.   Gastrointestinal: Negative for abdominal pain, diarrhea, nausea and vomiting.   Genitourinary: Negative for dysuria, frequency and urgency.   Musculoskeletal: Negative for back pain and neck pain.   Skin: Negative for pallor and rash.   Neurological: Negative for headaches.   Psychiatric/Behavioral: Negative.    All other systems reviewed and are negative.      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   • Insomnia    • Menopausal symptoms 9/2/2016   • Morbid obesity due to excess calories (CMS/[facility]) 8/8/2016   • Overactive bladder 9/2/2016   • Pain of both hip joints 8/8/2016   • Rectal polyp 4/3/2020   • Sarcoidosis of lung (CMS/[facility]) 9/2/2016   • Sarcoidosis of skin 9/2/2016       No Known Allergies    Past Surgical History:   Procedure Laterality Date   • HYSTERECTOMY     • SHOULDER SURGERY      Right shoulder   • TUBAL ABDOMINAL LIGATION         Family History   Problem Relation Age of Onset   • Hyperlipidemia Mother    • Osteoarthritis Mother    • Hypertension Mother    • Arthritis Father    • Heart failure Father    • Diabetes Father    • Hypertension Father    • Kidney disease Father    • Stroke Father    • Obesity Other        Social History     Socioeconomic History   • Marital status:      Spouse name: Not on file   • Number of children: Not on file   • Years of education: Not on file   • Highest education level: Not on file   Tobacco Use   • Smoking status: Former Smoker   • Smokeless tobacco: Never Used   • Tobacco comment: quit 25 yrs ago; smoked 1ppd x 15 yrs    Substance and Sexual Activity   • Alcohol use: Never     Frequency: Never   • Drug use: Never   • Sexual activity: Defer   Social History Narrative    Works for [employer].            Objective   Physical Exam  Vitals signs  and nursing note reviewed.   Constitutional:       General: She is not in acute distress.     Appearance: She is well-developed. She is not diaphoretic.   HENT:      Head: Normocephalic and atraumatic.      Nose: Nose normal.   Eyes:      General: No scleral icterus.     Conjunctiva/sclera: Conjunctivae normal.   Neck:      Musculoskeletal: Normal range of motion and neck supple.   Cardiovascular:      Rate and Rhythm: Normal rate and regular rhythm.      Heart sounds: Normal heart sounds. No murmur.   Pulmonary:      Effort: Pulmonary effort is normal. No respiratory distress.      Breath sounds: Examination of the right-upper field reveals wheezing. Examination of the left-upper field reveals wheezing. Examination of the right-middle field reveals wheezing. Examination of the left-middle field reveals wheezing. Examination of the right-lower field reveals decreased breath sounds and wheezing. Examination of the left-lower field reveals decreased breath sounds and wheezing. Decreased breath sounds, wheezing and rhonchi present. No rales.   Chest:      Chest wall: No mass, deformity, tenderness, crepitus or edema. There is no dullness to percussion.   Abdominal:      General: Bowel sounds are normal.      Palpations: Abdomen is soft.      Tenderness: There is no abdominal tenderness.   Musculoskeletal: Normal range of motion.   Skin:     General: Skin is warm and dry.   Neurological:      Mental Status: She is alert and oriented to person, place, and time.   Psychiatric:         Behavior: Behavior normal.         Procedures           ED Course  ED Course as of Dec 20 0751   Sat Dec 19, 2020   1008 Troponin T: <0.010 [RS]   1008 Lactate: 1.9 [RS]   1008 proBNP: 91.3 [RS]      ED Course User Index  [RS] Alexis Lamar MD                                   This Jas states she feels much improved 100% better she like to be discharged home.  We discussed the CT scan scan findings as well as the laboratory  data.  She is can be sent home with a new inhaler should be given a tapering dose of steroids and to follow-up with her primary care physician.  Recent Results (from the past 24 hour(s))   Comprehensive Metabolic Panel    Collection Time: 12/19/20  9:03 AM    Specimen: Blood   Result Value Ref Range    Glucose 91 65 - 99 mg/dL    BUN 12 8 - 23 mg/dL    Creatinine 0.84 0.57 - 1.00 mg/dL    Sodium 137 136 - 145 mmol/L    Potassium 3.9 3.5 - 5.2 mmol/L    Chloride 102 98 - 107 mmol/L    CO2 25.0 22.0 - 29.0 mmol/L    Calcium 9.0 8.6 - 10.5 mg/dL    Total Protein 7.5 6.0 - 8.5 g/dL    Albumin 3.70 3.50 - 5.20 g/dL    ALT (SGPT) 23 1 - 33 U/L    AST (SGOT) 16 1 - 32 U/L    Alkaline Phosphatase 133 (H) 39 - 117 U/L    Total Bilirubin 0.2 0.0 - 1.2 mg/dL    eGFR  African Amer 83 >60 mL/min/1.73    Globulin 3.8 gm/dL    A/G Ratio 1.0 g/dL    BUN/Creatinine Ratio 14.3 7.0 - 25.0    Anion Gap 10.0 5.0 - 15.0 mmol/L   BNP    Collection Time: 12/19/20  9:03 AM    Specimen: Blood   Result Value Ref Range    proBNP 91.3 0.0 - 900.0 pg/mL   Troponin    Collection Time: 12/19/20  9:03 AM    Specimen: Blood   Result Value Ref Range    Troponin T <0.010 0.000 - 0.030 ng/mL   Light Blue Top    Collection Time: 12/19/20  9:03 AM   Result Value Ref Range    Extra Tube hold for add-on    Green Top (Gel)    Collection Time: 12/19/20  9:03 AM   Result Value Ref Range    Extra Tube Hold for add-ons.    Lavender Top    Collection Time: 12/19/20  9:03 AM   Result Value Ref Range    Extra Tube hold for add-on    Gold Top - SST    Collection Time: 12/19/20  9:03 AM   Result Value Ref Range    Extra Tube Hold for add-ons.    CBC Auto Differential    Collection Time: 12/19/20  9:03 AM    Specimen: Blood   Result Value Ref Range    WBC 4.19 3.40 - 10.80 10*3/mm3    RBC 4.17 3.77 - 5.28 10*6/mm3    Hemoglobin 11.9 (L) 12.0 - 15.9 g/dL    Hematocrit 38.8 34.0 - 46.6 %    MCV 93.0 79.0 - 97.0 fL    MCH 28.5 26.6 - 33.0 pg    MCHC 30.7 (L) 31.5 - 35.7  g/dL    RDW 14.1 12.3 - 15.4 %    RDW-SD 47.1 37.0 - 54.0 fl    MPV 9.5 6.0 - 12.0 fL    Platelets 224 140 - 450 10*3/mm3    Neutrophil % 49.9 42.7 - 76.0 %    Lymphocyte % 28.4 19.6 - 45.3 %    Monocyte % 14.3 (H) 5.0 - 12.0 %    Eosinophil % 6.7 (H) 0.3 - 6.2 %    Basophil % 0.2 0.0 - 1.5 %    Immature Grans % 0.5 0.0 - 0.5 %    Neutrophils, Absolute 2.09 1.70 - 7.00 10*3/mm3    Lymphocytes, Absolute 1.19 0.70 - 3.10 10*3/mm3    Monocytes, Absolute 0.60 0.10 - 0.90 10*3/mm3    Eosinophils, Absolute 0.28 0.00 - 0.40 10*3/mm3    Basophils, Absolute 0.01 0.00 - 0.20 10*3/mm3    Immature Grans, Absolute 0.02 0.00 - 0.05 10*3/mm3    nRBC 0.0 0.0 - 0.2 /100 WBC   Lactic Acid, Plasma    Collection Time: 12/19/20  9:03 AM    Specimen: Blood   Result Value Ref Range    Lactate 1.9 0.5 - 2.0 mmol/L     Note: In addition to lab results from this visit, the labs listed above may include labs taken at another facility or during a different encounter within the last 24 hours. Please correlate lab times with ED admission and discharge times for further clarification of the services performed during this visit.    CT Angiogram Chest   Final Result   No acute pulmonary embolus.       Previously noted aread suspicious for multifocal pneumonia have   improved. No evidence of worsened or new acute infectious or   inflammatory process in the chest.           This report was finalized on 12/19/2020 11:23 AM by Magen Pierson.            Vitals:    12/19/20 1400 12/19/20 1420 12/19/20 1440 12/19/20 1448   BP: 155/91 161/91 153/87 153/87   BP Location:    Right arm   Patient Position:    Lying   Pulse: 88 87  87   Resp:    20   Temp:       TempSrc:       SpO2:  (!) 88%  92%   Weight:       Height:         Medications   albuterol sulfate HFA (PROVENTIL HFA;VENTOLIN HFA;PROAIR HFA) inhaler 6 puff (6 puffs Inhalation Given 12/19/20 1002)   dexamethasone (DECADRON) IVPB 10 mg (0 mg Intravenous Stopped 12/19/20 1005)   iopamidol  (ISOVUE-370) 76 % injection 75 mL (75 mL Intravenous Given 12/19/20 1102)     ECG/EMG Results (last 24 hours)     ** No results found for the last 24 hours. **        ECG 12 Lead    (Results Pending)               MDM  Number of Diagnoses or Management Options  Bronchospasm: new and requires workup  History of 2019 novel coronavirus disease (COVID-19): established and improving     Amount and/or Complexity of Data Reviewed  Clinical lab tests: reviewed and ordered  Tests in the radiology section of CPT®: reviewed and ordered  Tests in the medicine section of CPT®: ordered and reviewed  Discuss the patient with other providers: yes    Patient Progress  Patient progress: stable      Final diagnoses:   Bronchospasm   History of 2019 novel coronavirus disease (COVID-19)            Percy Wise PA  12/20/20 0755

## 2020-12-21 ENCOUNTER — TELEPHONE (OUTPATIENT)
Dept: INTERNAL MEDICINE | Facility: CLINIC | Age: 60
End: 2020-12-21

## 2020-12-21 NOTE — TELEPHONE ENCOUNTER
PATIENT CALLED STATING SHE WAS SUPPOSED TO DO BLOOD WORK AND [facility]URO LUA WAS SUPPOSED TO CALL TO MAKE HER AN APPOINTMENT BUT PATIENT HAD TO GO TO THE ER ON Saturday 12/19/20 AND THEY DID BLOOD WORK.    PATIENT WANTED TO KNOW IF ARTURO LUA COULD GET THE RESULTS FROM THOSE LABS WHEN AVAILABLE.    ADDITIONAL: Sunday 12/20/20 PATIENT STARTED HAVING THE SHAKES IN HER HANDS. IT IS NOW ALL THE TIME AND SHE IS NOT SURE WHY. IT IS A MILD CASE BECAUSE SHE CAN  ITEMS BUT IF SHE IS TRYING TO DIAL A PHONE NUMBER SHE HAS TYPOS BECAUSE HER HANDS SHAKE    PLEASE ADVISE AND CALL PATIENT [phone]

## 2020-12-21 NOTE — TELEPHONE ENCOUNTER
PT IS REQUESTING A CALL BACK REGARDING RESULTS FROM OVER THE WEEKEND AT THE HOSPITAL.      PLEASE ADVISE  [phone]

## 2020-12-22 ENCOUNTER — READMISSION MANAGEMENT (OUTPATIENT)
Dept: CALL CENTER | Facility: HOSPITAL | Age: 60
End: 2020-12-22

## 2020-12-22 LAB
QT INTERVAL: 332 MS
QTC INTERVAL: 408 MS

## 2020-12-22 NOTE — OUTREACH NOTE
COVID-19 Week 3 Survey      Responses   [facility] patient discharged from?  [place]   Does the patient have one of the following disease processes/diagnoses(primary or secondary)?  COVID-19   COVID-19 underlying condition?  None   Call Number  Call 1   COVID-19 Week 3: Call 1 attempt successful?  No   Discharge diagnosis  Pneumonia due to COVID-19 virus          Deepali Santos, RN

## 2020-12-22 NOTE — TELEPHONE ENCOUNTER
Pt advised liver enzymes were back to normal and rest of labs look good, recheck of ferritin level was not done

## 2020-12-22 NOTE — TELEPHONE ENCOUNTER
Please let her know that her liver enzymes were back to normal and everything else looked fine. They did not recheck her ferritin level.

## 2020-12-29 ENCOUNTER — READMISSION MANAGEMENT (OUTPATIENT)
Dept: CALL CENTER | Facility: HOSPITAL | Age: 60
End: 2020-12-29

## 2020-12-29 NOTE — OUTREACH NOTE
COVID-19 Week 4 Survey      Responses   [facility] patient discharged from?  [place]   Does the patient have one of the following disease processes/diagnoses(primary or secondary)?  COVID-19   COVID-19 underlying condition?  None   Call Number  Call 1   COVID-19 Week 4: Call 1 attempt successful?  No   Discharge diagnosis  Pneumonia due to COVID-19 virus          Angela Asencio RN

## 2020-12-31 ENCOUNTER — TELEPHONE (OUTPATIENT)
Dept: INTERNAL MEDICINE | Facility: CLINIC | Age: 60
End: 2020-12-31

## 2020-12-31 NOTE — TELEPHONE ENCOUNTER
PT CALLED STATING SHE IS WAITING ON THE HEALTH DEPARTMENT TO RELEASE HER    SHE STATED SHE NEEDS A STATEMENT STATING THAT SHE WILL STILL HAVE [employer] FOR WORK    PLEASE ADVISE At: [phone]

## 2021-01-04 ENCOUNTER — TELEPHONE (OUTPATIENT)
Dept: INTERNAL MEDICINE | Facility: CLINIC | Age: 61
End: 2021-01-04

## 2021-01-04 NOTE — TELEPHONE ENCOUNTER
PATIENT CALLED AND STATED SHE WOULD LIKE A CALL BACK REGARDING A RETURN TO WORK.     CALL BACK:[phone]

## 2021-01-07 ENCOUNTER — OFFICE VISIT (OUTPATIENT)
Dept: INTERNAL MEDICINE | Facility: CLINIC | Age: 61
End: 2021-01-07

## 2021-01-07 VITALS
OXYGEN SATURATION: 96 % | SYSTOLIC BLOOD PRESSURE: 148 MMHG | WEIGHT: 213 LBS | DIASTOLIC BLOOD PRESSURE: 94 MMHG | HEART RATE: 93 BPM | BODY MASS INDEX: 37.73 KG/M2

## 2021-01-07 DIAGNOSIS — U07.1 PNEUMONIA DUE TO COVID-19 VIRUS: ICD-10-CM

## 2021-01-07 DIAGNOSIS — J12.82 PNEUMONIA DUE TO COVID-19 VIRUS: ICD-10-CM

## 2021-01-07 DIAGNOSIS — J45.21 MILD INTERMITTENT REACTIVE AIRWAY DISEASE WITH ACUTE EXACERBATION: Primary | ICD-10-CM

## 2021-01-07 DIAGNOSIS — D86.9 SARCOIDOSIS: ICD-10-CM

## 2021-01-07 PROCEDURE — 99214 OFFICE O/P EST MOD 30 MIN: CPT | Performed by: PHYSICIAN ASSISTANT

## 2021-01-07 RX ORDER — LEVALBUTEROL INHALATION SOLUTION 1.25 MG/3ML
1 SOLUTION RESPIRATORY (INHALATION) EVERY 4 HOURS PRN
Qty: 180 ML | Refills: 12 | Status: SHIPPED | OUTPATIENT
Start: 2021-01-07 | End: 2021-01-08 | Stop reason: SDUPTHER

## 2021-01-07 NOTE — PROGRESS NOTES
Chief Complaint   Patient presents with   • Cough     Follow Up   • Shortness of Breath       Subjective     Diane Denson is a 60 y.o. female.        History of Present Illness     After last visit with me patient began feeling worse with increasing cough and shortness of breath. She went back to the ER and was treated with steroid taper and albuterol inhaler. She feels jittery and has tremors, worse after using albuterol.    She cannot walk for any period of time without triggering a coughing fit. Oxygen levels remain above 93%.    She is supposed to return to work next week and her current health status will not allow her to do her job. Also they have concern about her ongoing cough with Covid infection.        Current Outpatient Medications:   •  albuterol sulfate HFA (ProAir HFA) 108 (90 Base) MCG/ACT inhaler, Inhale 2 puffs Every 4 (Four) Hours As Needed for Wheezing., Disp: 18 g, Rfl: 0  •  amitriptyline (ELAVIL) 25 MG tablet, Take 1-2 tablets at night as needed., Disp: 180 tablet, Rfl: 1  •  amLODIPine (NORVASC) 5 MG tablet, Take 1 tablet by mouth Daily., Disp: 30 tablet, Rfl: 5  •  cyclobenzaprine (FLEXERIL) 10 MG tablet, Take 1 tablet by mouth 3 (Three) Times a Day As Needed for Muscle Spasms., Disp: 30 tablet, Rfl: 0  •  guaiFENesin (MUCINEX) 600 MG 12 hr tablet, Take 1 tablet by mouth Every 12 (Twelve) Hours., Disp: 30 tablet, Rfl: 0  •  losartan-hydrochlorothiazide (HYZAAR) 50-12.5 MG per tablet, TAKE 1 TABLET BY MOUTH EVERY DAY, Disp: 90 tablet, Rfl: 0  •  omeprazole (priLOSEC) 40 MG capsule, 1 capsule by mouth twice daily, Disp: 60 capsule, Rfl: 3  •  oxybutynin XL (Ditropan XL) 5 MG 24 hr tablet, Take 1 tablet by mouth Daily., Disp: 30 tablet, Rfl: 5  •  polyethylene glycol (MIRALAX) packet, Take 17 g by mouth Daily., Disp: 30 each, Rfl: 3  •  benzonatate (TESSALON) 100 MG capsule, Take 1 capsule by mouth 3 (Three) Times a Day As Needed for Cough., Disp: 30 capsule, Rfl: 0  •  Fluticasone  Furoate-Vilanterol (Breo Ellipta) 100-25 MCG/INH inhaler, Inhale 1 puff Daily., Disp: 60 each, Rfl: 2  •  levalbuterol (Xopenex) 1.25 MG/3ML nebulizer solution, Take 1 ampule by nebulization Every 8 (Eight) Hours As Needed for Wheezing or Shortness of Air., Disp: 180 mL, Rfl: 12     PMFSH  The following portions of the patient's history were reviewed and updated as appropriate: allergies, current medications, past family history, past medical history, past social history, past surgical history and problem list.    Review of Systems   Constitutional: Positive for fatigue. Negative for activity change and appetite change.   HENT: Negative for congestion and rhinorrhea.    Respiratory: Positive for cough and wheezing. Negative for chest tightness and shortness of breath.    Cardiovascular: Negative for chest pain and palpitations.   Gastrointestinal: Negative for abdominal pain.   Genitourinary: Negative for dysuria.   Musculoskeletal: Negative for arthralgias and myalgias.   Neurological: Positive for tremors and weakness. Negative for dizziness, light-headedness and headaches.   Psychiatric/Behavioral: Negative for dysphoric mood. The patient is not nervous/anxious.        Objective   /94   Pulse 93   Wt 96.6 kg (213 lb)   SpO2 96%   BMI 37.73 kg/m²     Physical Exam  Vitals signs and nursing note reviewed.   Constitutional:       Appearance: She is well-developed.   HENT:      Head: Normocephalic.      Right Ear: Hearing, tympanic membrane, ear canal and external ear normal.      Left Ear: Hearing, tympanic membrane, ear canal and external ear normal.      Nose: Nose normal.   Eyes:      Conjunctiva/sclera: Conjunctivae normal.      Pupils: Pupils are equal, round, and reactive to light.   Neck:      Musculoskeletal: Normal range of motion.   Cardiovascular:      Rate and Rhythm: Normal rate and regular rhythm.      Heart sounds: Normal heart sounds.   Pulmonary:      Effort: Pulmonary effort is normal.       Breath sounds: Normal breath sounds. No decreased breath sounds, wheezing, rhonchi or rales.      Comments: Coughing with continued talking  Musculoskeletal: Normal range of motion.   Skin:     General: Skin is warm and dry.   Neurological:      Mental Status: She is alert.   Psychiatric:         Behavior: Behavior normal.              ASSESSMENT/PLAN    Diagnoses and all orders for this visit:    Diagnoses and all orders for this visit:    1. Mild intermittent reactive airway disease with acute exacerbation (Primary)  Comments:  Start BREO inhaler. Switch to xoponex with nebulizer.   Orders:  -     Discontinue: levalbuterol (Xopenex) 1.25 MG/3ML nebulizer solution; Take 1 ampule by nebulization Every 4 (Four) Hours As Needed for Wheezing.  Dispense: 180 mL; Refill: 12  -     Fluticasone Furoate-Vilanterol (Breo Ellipta) 100-25 MCG/INH inhaler; Inhale 1 puff Daily.  Dispense: 60 each; Refill: 2    2. Sarcoidosis  Comments:  Historically only sarcoidosis of skin but due to persistent symptoms related to Covid, refer to Pulmonology for eval.  Orders:  -     Ambulatory Referral to Pulmonology    3. Pneumonia due to COVID-19 virus  Assessment & Plan:  Persistent symptoms. Treat for reactive airway with BREO and xoponex nebulizer. Due to persistent symptoms and underlying sarcoidosis of skin, refer to Pulmonology for eval    Orders:  -     Ambulatory Referral to Pulmonology           Return in about 2 weeks (around 1/21/2021) for Follow up.

## 2021-01-08 ENCOUNTER — TELEPHONE (OUTPATIENT)
Dept: INTERNAL MEDICINE | Facility: CLINIC | Age: 61
End: 2021-01-08

## 2021-01-08 DIAGNOSIS — J45.21 MILD INTERMITTENT REACTIVE AIRWAY DISEASE WITH ACUTE EXACERBATION: ICD-10-CM

## 2021-01-08 RX ORDER — LEVALBUTEROL INHALATION SOLUTION 1.25 MG/3ML
1 SOLUTION RESPIRATORY (INHALATION) EVERY 8 HOURS PRN
Qty: 180 ML | Refills: 12 | Status: SHIPPED | OUTPATIENT
Start: 2021-01-08 | End: 2021-01-14 | Stop reason: SDUPTHER

## 2021-01-08 NOTE — TELEPHONE ENCOUNTER
Patient is also wanting to know if a letter can be written so that she can work from home.    Call back [phone]

## 2021-01-08 NOTE — TELEPHONE ENCOUNTER
Caller: Diane Denson    Relationship: Self    Best call back number: [phone]    What medication are you requesting: PREDNISONE    What are your current symptoms: HARD TO BREATH - CONGESTION - COUGH COPD - POSITIVE FOR COVID AT [employer] - CHRONIC BRONCHITIS     How long have you been experiencing symptoms: SINCE November     Have you had these symptoms before:    [x] Yes  [] No    Have you been treated for these symptoms before:   [x] Yes  [] No    If a prescription is needed, what is your preferred pharmacy and phone number:    [id number] - [place], [place] - [address] [place] [address] [phone]  -      Additional notes: WAS IN YESTERDAY FOR VISIT AND WAS PRESCRIBED A MEDICATION BUT INSURANCE WOULD NOT COVER, THINKS IT MIGHT HAVE BEEN AN INHALER BUT WASN'T SURE

## 2021-01-10 NOTE — ASSESSMENT & PLAN NOTE
Persistent symptoms. Treat for reactive airway with BREO and xoponex nebulizer. Due to persistent symptoms and underlying sarcoidosis of skin, refer to Pulmonology for eval

## 2021-01-11 DIAGNOSIS — I10 ESSENTIAL HYPERTENSION: ICD-10-CM

## 2021-01-11 DIAGNOSIS — J45.21 MILD INTERMITTENT REACTIVE AIRWAY DISEASE WITH ACUTE EXACERBATION: ICD-10-CM

## 2021-01-11 RX ORDER — LEVALBUTEROL INHALATION SOLUTION 1.25 MG/3ML
1 SOLUTION RESPIRATORY (INHALATION) EVERY 8 HOURS PRN
Qty: 180 ML | Refills: 12 | Status: CANCELLED | OUTPATIENT
Start: 2021-01-11

## 2021-01-11 RX ORDER — LOSARTAN POTASSIUM AND HYDROCHLOROTHIAZIDE 12.5; 5 MG/1; MG/1
1 TABLET ORAL DAILY
Qty: 90 TABLET | Refills: 1 | Status: SHIPPED | OUTPATIENT
Start: 2021-01-11 | End: 2022-02-01 | Stop reason: SDUPTHER

## 2021-01-11 NOTE — TELEPHONE ENCOUNTER
Caller: Diane Denson    Relationship: Self    Best call back number: [phone]    Medication needed:   levalbuterol (Xopenex) 1.25 MG/3ML nebulizer solution  1 ampule, Every 8 Hours PRN 12 ordered         Summary: Take 1 ampule by nebulization Every 8 (Eight) Hours As Needed for Wheezing or Shortness of Air., Starting Fri 1/8/2021, Normal   Dose, Route, Frequency: 1 ampule, Nebulization, Every 8 Hours PRN            When do you need the refill by: TODAY    What details did the patient provide when requesting the medication: PATIENT STATES THAT THE PHARMACIST STATES THAT AN PRIOR AUTHORIZATION WILL BE NEEDED IN ORDER FOR THEM TO FILL THE PRESCRIPTION.     Does the patient have less than a 3 day supply:  [x] Yes  [] No    What is the patient's preferred pharmacy:    [facility]/pharmacy #[id number] - [address] [phone] [facility] [phone] FX

## 2021-01-13 NOTE — TELEPHONE ENCOUNTER
PT STATED THAT THE levalbuterol (Xopenex) 1.25 MG/3ML nebulizer solution WAS DENIED BY INSURANCE. IF NOT ABLE TO GET APPROVED THEN WOULD LIKE TO REQUEST PREDNISONE IN PLACE.    CALL BACK [phone]     [facility]/pharmacy #[id number] - [address] - [phone]  - [phone]   [phone]

## 2021-01-14 RX ORDER — METHYLPREDNISOLONE 4 MG/1
TABLET ORAL
Qty: 21 TABLET | Refills: 0 | Status: SHIPPED | OUTPATIENT
Start: 2021-01-14 | End: 2021-01-21

## 2021-01-14 RX ORDER — LEVALBUTEROL INHALATION SOLUTION 1.25 MG/3ML
1 SOLUTION RESPIRATORY (INHALATION) DAILY
Qty: 90 ML | Refills: 1 | Status: SHIPPED | OUTPATIENT
Start: 2021-01-14 | End: 2021-02-11 | Stop reason: SDUPTHER

## 2021-01-14 NOTE — TELEPHONE ENCOUNTER
Spoke with pharmacist and he said they are kicking the prescription back, he states the insurance is not wanting to pay for more than once daily    Spoke with pt to let her know what is going on, she is requesting a medrol dose audi

## 2021-01-14 NOTE — TELEPHONE ENCOUNTER
Spoke with pharmacy and changed rx to once daily. She can do the levalbuterol in the nebulizer once daily and if needed continue the albuterol inhaler as needed.    I will send in a medrol dose pack as well.

## 2021-01-21 ENCOUNTER — OFFICE VISIT (OUTPATIENT)
Dept: INTERNAL MEDICINE | Facility: CLINIC | Age: 61
End: 2021-01-21

## 2021-01-21 VITALS
HEART RATE: 95 BPM | SYSTOLIC BLOOD PRESSURE: 146 MMHG | OXYGEN SATURATION: 97 % | WEIGHT: 215.2 LBS | BODY MASS INDEX: 38.12 KG/M2 | DIASTOLIC BLOOD PRESSURE: 92 MMHG

## 2021-01-21 DIAGNOSIS — I49.9 IRREGULAR HEART BEAT: ICD-10-CM

## 2021-01-21 DIAGNOSIS — U09.9 POST-COVID-19 CONDITION: Primary | ICD-10-CM

## 2021-01-21 PROCEDURE — 93000 ELECTROCARDIOGRAM COMPLETE: CPT | Performed by: PHYSICIAN ASSISTANT

## 2021-01-21 PROCEDURE — 99212 OFFICE O/P EST SF 10 MIN: CPT | Performed by: PHYSICIAN ASSISTANT

## 2021-01-21 NOTE — PROGRESS NOTES
Chief Complaint   Patient presents with   • Shortness of Breath     Follow Up       Subjective     Diane Denson is a 60 y.o. female.        History of Present Illness     Pt says the cough is improving since her last visit. She remains fatigued and does cough when she talks on the phone during the days she works. Still has occasional feversHas been using nebulizer with levalbuterol but cannot tell that it helps much. Using the BREO inhaler daily. Not ready to return to work full time but would like to continue doing 3 days a week.      Current Outpatient Medications:   •  albuterol sulfate HFA (ProAir HFA) 108 (90 Base) MCG/ACT inhaler, Inhale 2 puffs Every 4 (Four) Hours As Needed for Wheezing., Disp: 18 g, Rfl: 0  •  amitriptyline (ELAVIL) 25 MG tablet, Take 1-2 tablets at night as needed., Disp: 180 tablet, Rfl: 1  •  amLODIPine (NORVASC) 5 MG tablet, Take 1 tablet by mouth Daily., Disp: 30 tablet, Rfl: 5  •  benzonatate (TESSALON) 100 MG capsule, Take 1 capsule by mouth 3 (Three) Times a Day As Needed for Cough., Disp: 30 capsule, Rfl: 0  •  cyclobenzaprine (FLEXERIL) 10 MG tablet, Take 1 tablet by mouth 3 (Three) Times a Day As Needed for Muscle Spasms., Disp: 30 tablet, Rfl: 0  •  Fluticasone Furoate-Vilanterol (Breo Ellipta) 100-25 MCG/INH inhaler, Inhale 1 puff Daily., Disp: 60 each, Rfl: 2  •  guaiFENesin (MUCINEX) 600 MG 12 hr tablet, Take 1 tablet by mouth Every 12 (Twelve) Hours., Disp: 30 tablet, Rfl: 0  •  levalbuterol (Xopenex) 1.25 MG/3ML nebulizer solution, Take 1 ampule by nebulization Daily., Disp: 90 mL, Rfl: 1  •  losartan-hydrochlorothiazide (HYZAAR) 50-12.5 MG per tablet, Take 1 tablet by mouth Daily., Disp: 90 tablet, Rfl: 1  •  omeprazole (priLOSEC) 40 MG capsule, 1 capsule by mouth twice daily, Disp: 60 capsule, Rfl: 3  •  oxybutynin XL (Ditropan XL) 5 MG 24 hr tablet, Take 1 tablet by mouth Daily., Disp: 30 tablet, Rfl: 5  •  polyethylene glycol (MIRALAX) packet, Take 17 g by  mouth Daily., Disp: 30 each, Rfl: 3     PMFSH  The following portions of the patient's history were reviewed and updated as appropriate: allergies, current medications, past family history, past medical history, past social history, past surgical history and problem list.    Review of Systems   Constitutional: Positive for fatigue. Negative for activity change and appetite change.   HENT: Negative for congestion and rhinorrhea.    Respiratory: Positive for cough. Negative for chest tightness, shortness of breath and wheezing.    Cardiovascular: Negative for chest pain and palpitations.   Gastrointestinal: Negative for abdominal pain.   Genitourinary: Negative for dysuria.   Musculoskeletal: Negative for arthralgias and myalgias.   Neurological: Positive for weakness. Negative for dizziness, light-headedness and headaches.   Psychiatric/Behavioral: Negative for dysphoric mood. The patient is not nervous/anxious.        Objective   /92   Pulse 95   Wt 97.6 kg (215 lb 3.2 oz)   SpO2 97%   BMI 38.12 kg/m²       Physical Exam  Vitals signs and nursing note reviewed.   Constitutional:       Appearance: She is well-developed.   HENT:      Head: Normocephalic.      Right Ear: Hearing, tympanic membrane, ear canal and external ear normal.      Left Ear: Hearing, tympanic membrane, ear canal and external ear normal.      Nose: Nose normal.   Eyes:      Conjunctiva/sclera: Conjunctivae normal.      Pupils: Pupils are equal, round, and reactive to light.   Neck:      Musculoskeletal: Normal range of motion.   Cardiovascular:      Rate and Rhythm: Normal rate. Rhythm irregular.      Heart sounds: Normal heart sounds.   Pulmonary:      Effort: Pulmonary effort is normal.      Breath sounds: Normal breath sounds. No decreased breath sounds, wheezing, rhonchi or rales.   Musculoskeletal: Normal range of motion.   Skin:     General: Skin is warm and dry.   Neurological:      Mental Status: She is alert.   Psychiatric:          Behavior: Behavior normal.         ECG 12 Lead    Date/Time: 1/21/2021 1:33 PM  Performed by: Cindy Rosa PA  Authorized by: Cindy Rosa PA   Comparison: compared with previous ECG from 12/19/2020  Similar to previous ECG  Rhythm: sinus rhythm  Rate: normal  BPM: 82  Conduction: conduction normal  Q waves: III and aVF    ST Segments: ST segments normal  T Waves: T waves normal  QRS axis: normal    Clinical impression: non-specific ECG            ASSESSMENT/PLAN    Diagnoses and all orders for this visit:    1. 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 Continue limited work schedule and progress activity slowly, as tolerated. F/u in 2 wks.    2. Irregular heart beat  Comments:  Noted irregularities on exam- ECG wnl. Refer for holter monitor. Further recs based on results.  Orders:  -     ECG 12 Lead  -     Holter Monitor - 72 Hour Up To 15 Days; Future             Return in 2 weeks (on 2/4/2021) for Follow up.

## 2021-01-22 ENCOUNTER — TELEPHONE (OUTPATIENT)
Dept: INTERNAL MEDICINE | Facility: CLINIC | Age: 61
End: 2021-01-22

## 2021-01-22 NOTE — TELEPHONE ENCOUNTER
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  PT STATES THAT SHE WOULD  LIKE TO STAY WITH THE 3 DAYS THAT WAS ORIGINALLY TALKED ABOUT.        PLEASE ADVISE  [phone]

## 2021-01-25 ENCOUNTER — TELEPHONE (OUTPATIENT)
Dept: INTERNAL MEDICINE | Facility: CLINIC | Age: 61
End: 2021-01-25

## 2021-01-25 NOTE — TELEPHONE ENCOUNTER
Patient needs a new note stating she can do the 3 days and writing her to return on the 8th, request for it to be mailed to her, she is requesting a prescription for prednisone, and she was asking about the holter monitor, I don't see a referral for it

## 2021-01-25 NOTE — TELEPHONE ENCOUNTER
Caller: Diane Denson    Relationship to patient: Self    Best call back number: [phone]     Chief complaint: SHORTNESS OF BREATH.     Patient directed to call 911 or go to their nearest emergency room.     Patient verbalized understanding: [] Yes  [x] No  If no, why?  PATIENT STATED THAT IT HAD BEEN WORSE AND SHE WAS FINE.     Additional notes: PATIENT CONTACTED STATING THAT SHE WANTED A MESSAGE SENT BACK TO HER PCP. PATIENT STATES SHE WAS HAVING TROUBLE BREATHING AND DID SEEM TO HAVE SHALLOW BREATHS ON THE PHONE. PER [employer] POLICY, REFERRED PATIENT TO CONTACT 911 OR VISIT HER LOCAL EMERGENCY DEPARTMENT. PATIENT STATES THAT SHE WAS FINE AND THAT IT HAD BEEN WORSE. PER POLICY, STAFF ATTEMPTED TO WARM TRANSFER TO GET THE PATIENT WITH A CLINICAL STAFF MEMBER. DURING THAT PROCESS THE PATIENT'S LINE ENDED. CONTACT ATTEMPTS WERE MADE TO THE PATIENT A FEW TIMES. ALL CONTACTS WENT RIGHT TO VOICEMAIL. A VOICEMAIL WAS LEFT WITH OUR CONTACT INFORMATION AND ALSO THE SUGGESTION TO VISIT HER LOCAL EMERGENCY DEPARTMENT OR CALL 911 IF SHE IS STRUGGLING TO BREATHE. PATIENT'S WORK LINE WAS ALSO CONTACTED. THERE WAS INABILITY TO GET CONTACT THROUGH THAT METHOD. PER POLICY, A SAFE REPORT HAS BEEN FILED.

## 2021-01-25 NOTE — TELEPHONE ENCOUNTER
Letter mailed to pt, advised pt holter monitor has been ordered, but that Cindy does not want to order another round of prednisone since she has already been on 2 rounds

## 2021-01-25 NOTE — TELEPHONE ENCOUNTER
Caller: Diane Denson    Relationship: Self    Best call back number: [phone]    What medication are you requesting: SOMETHING FOR COUGH AND SHORTNESS OF BREATH    What are your current symptoms: SEVERE COUGHING AND SHORTNESS OF BREATH    How long have you been experiencing symptoms: SINCE November WHEN SHE HAD COVID 19    Have you had these symptoms before:    [x] Yes  [] No    Have you been treated for these symptoms before:   [x] Yes  [] No    If a prescription is needed, what is your preferred pharmacy and phone number: [facility][id number] - [place], [place] - [address] [place] [address] [phone] [facility] [phone]      Additional notes:

## 2021-01-25 NOTE — TELEPHONE ENCOUNTER
I wrote the letter that can be mailed to her. The holter monitor is now ordered. I would like to hold off on another round of prednisone since she has completed 2 rounds already.

## 2021-01-26 ENCOUNTER — LAB (OUTPATIENT)
Dept: LAB | Facility: HOSPITAL | Age: 61
End: 2021-01-26

## 2021-01-26 ENCOUNTER — OFFICE VISIT (OUTPATIENT)
Dept: INTERNAL MEDICINE | Facility: CLINIC | Age: 61
End: 2021-01-26

## 2021-01-26 VITALS
WEIGHT: 211.4 LBS | HEART RATE: 93 BPM | OXYGEN SATURATION: 98 % | SYSTOLIC BLOOD PRESSURE: 150 MMHG | TEMPERATURE: 97.7 F | BODY MASS INDEX: 37.45 KG/M2 | DIASTOLIC BLOOD PRESSURE: 90 MMHG

## 2021-01-26 DIAGNOSIS — R06.02 SHORTNESS OF BREATH: ICD-10-CM

## 2021-01-26 DIAGNOSIS — R06.02 SHORTNESS OF BREATH: Primary | ICD-10-CM

## 2021-01-26 DIAGNOSIS — U07.1 PNEUMONIA DUE TO COVID-19 VIRUS: ICD-10-CM

## 2021-01-26 DIAGNOSIS — R05.9 COUGH: ICD-10-CM

## 2021-01-26 DIAGNOSIS — J45.21 MILD INTERMITTENT REACTIVE AIRWAY DISEASE WITH ACUTE EXACERBATION: ICD-10-CM

## 2021-01-26 DIAGNOSIS — J12.82 PNEUMONIA DUE TO COVID-19 VIRUS: ICD-10-CM

## 2021-01-26 DIAGNOSIS — R74.8 ELEVATED LIVER ENZYMES: ICD-10-CM

## 2021-01-26 LAB
BASOPHILS # BLD AUTO: 0.03 10*3/MM3 (ref 0–0.2)
BASOPHILS NFR BLD AUTO: 0.5 % (ref 0–1.5)
DEPRECATED RDW RBC AUTO: 43.9 FL (ref 37–54)
EOSINOPHIL # BLD AUTO: 0.32 10*3/MM3 (ref 0–0.4)
EOSINOPHIL NFR BLD AUTO: 5 % (ref 0.3–6.2)
ERYTHROCYTE [DISTWIDTH] IN BLOOD BY AUTOMATED COUNT: 14 % (ref 12.3–15.4)
HCT VFR BLD AUTO: 40.1 % (ref 34–46.6)
HGB BLD-MCNC: 13.5 G/DL (ref 12–15.9)
IMM GRANULOCYTES # BLD AUTO: 0.04 10*3/MM3 (ref 0–0.05)
IMM GRANULOCYTES NFR BLD AUTO: 0.6 % (ref 0–0.5)
LYMPHOCYTES # BLD AUTO: 1.67 10*3/MM3 (ref 0.7–3.1)
LYMPHOCYTES NFR BLD AUTO: 26.3 % (ref 19.6–45.3)
MCH RBC QN AUTO: 29.3 PG (ref 26.6–33)
MCHC RBC AUTO-ENTMCNC: 33.7 G/DL (ref 31.5–35.7)
MCV RBC AUTO: 87.2 FL (ref 79–97)
MONOCYTES # BLD AUTO: 0.56 10*3/MM3 (ref 0.1–0.9)
MONOCYTES NFR BLD AUTO: 8.8 % (ref 5–12)
NEUTROPHILS NFR BLD AUTO: 3.72 10*3/MM3 (ref 1.7–7)
NEUTROPHILS NFR BLD AUTO: 58.8 % (ref 42.7–76)
NRBC BLD AUTO-RTO: 0 /100 WBC (ref 0–0.2)
PLATELET # BLD AUTO: 378 10*3/MM3 (ref 140–450)
PMV BLD AUTO: 9.9 FL (ref 6–12)
RBC # BLD AUTO: 4.6 10*6/MM3 (ref 3.77–5.28)
WBC # BLD AUTO: 6.34 10*3/MM3 (ref 3.4–10.8)

## 2021-01-26 PROCEDURE — 85025 COMPLETE CBC W/AUTO DIFF WBC: CPT

## 2021-01-26 PROCEDURE — 82728 ASSAY OF FERRITIN: CPT

## 2021-01-26 PROCEDURE — 80053 COMPREHEN METABOLIC PANEL: CPT

## 2021-01-26 PROCEDURE — 99214 OFFICE O/P EST MOD 30 MIN: CPT | Performed by: PHYSICIAN ASSISTANT

## 2021-01-26 PROCEDURE — 36415 COLL VENOUS BLD VENIPUNCTURE: CPT

## 2021-01-26 NOTE — TELEPHONE ENCOUNTER
Please keep trying to call her today to see if she can come back in or go to ER if shortness of breath continues to be severe. When I saw her last week, she was better.

## 2021-01-26 NOTE — PROGRESS NOTES
Chief Complaint   Patient presents with   • Cough     Follow Up   • Shortness of Breath       Subjective     Diane Denson is a 60 y.o. female.        History of Present Illness     Pt woke up feeling okay yesterday. In the afternoon she was at the store and suddenly developed shortness of breath and chest pain and worsening coughing. She became significantly fatigued and her  had to come get her. She called the office and was instructed to go to ER but did not. She coughed through the afternoon and the night. Her cough is nonproductive. She took cough syrup last night, after several hours finally fell asleep. Woke up feeling better. Feels like her symptoms get worse in the evening. She does feel overall her coughing fits had been improving but still has bad days when she cannot stop coughing and rest.    O2 sats have remained above 95%.       Current Outpatient Medications:   •  albuterol sulfate HFA (ProAir HFA) 108 (90 Base) MCG/ACT inhaler, Inhale 2 puffs Every 4 (Four) Hours As Needed for Wheezing., Disp: 18 g, Rfl: 0  •  amitriptyline (ELAVIL) 25 MG tablet, Take 1-2 tablets at night as needed., Disp: 180 tablet, Rfl: 1  •  amLODIPine (NORVASC) 5 MG tablet, Take 1 tablet by mouth Daily., Disp: 30 tablet, Rfl: 5  •  benzonatate (TESSALON) 100 MG capsule, Take 1 capsule by mouth 3 (Three) Times a Day As Needed for Cough., Disp: 30 capsule, Rfl: 0  •  cyclobenzaprine (FLEXERIL) 10 MG tablet, Take 1 tablet by mouth 3 (Three) Times a Day As Needed for Muscle Spasms., Disp: 30 tablet, Rfl: 0  •  guaiFENesin (MUCINEX) 600 MG 12 hr tablet, Take 1 tablet by mouth Every 12 (Twelve) Hours., Disp: 30 tablet, Rfl: 0  •  levalbuterol (Xopenex) 1.25 MG/3ML nebulizer solution, Take 1 ampule by nebulization Daily., Disp: 90 mL, Rfl: 1  •  losartan-hydrochlorothiazide (HYZAAR) 50-12.5 MG per tablet, Take 1 tablet by mouth Daily., Disp: 90 tablet, Rfl: 1  •  omeprazole (priLOSEC) 40 MG capsule, 1 capsule by  mouth twice daily, Disp: 60 capsule, Rfl: 3  •  oxybutynin XL (Ditropan XL) 5 MG 24 hr tablet, Take 1 tablet by mouth Daily., Disp: 30 tablet, Rfl: 5  •  polyethylene glycol (MIRALAX) packet, Take 17 g by mouth Daily., Disp: 30 each, Rfl: 3  •  Fluticasone Furoate-Vilanterol (BREO ELLIPTA) 200-25 MCG/INH inhaler, Inhale 1 puff Daily., Disp: 60 each, Rfl: 2  •  Tiotropium Bromide Monohydrate (Spiriva Respimat) 1.25 MCG/ACT aerosol solution inhaler, Inhale 2 puffs Daily., Disp: 4 g, Rfl: 2     PMFSH  The following portions of the patient's history were reviewed and updated as appropriate: allergies, current medications, past family history, past medical history, past social history, past surgical history and problem list.    Review of Systems   Constitutional: Positive for fatigue. Negative for activity change, appetite change, diaphoresis and fever.   HENT: Negative for congestion and rhinorrhea.    Respiratory: Positive for cough, chest tightness and shortness of breath. Negative for wheezing.    Cardiovascular: Negative for chest pain and palpitations.   Gastrointestinal: Negative for abdominal pain.   Genitourinary: Negative for dysuria.   Musculoskeletal: Negative for arthralgias and myalgias.   Neurological: Negative for dizziness, weakness, light-headedness and headaches.   Psychiatric/Behavioral: Negative for dysphoric mood. The patient is not nervous/anxious.        Objective   /90   Pulse 93   Temp 97.7 °F (36.5 °C)   Wt 95.9 kg (211 lb 6.4 oz)   SpO2 98%   BMI 37.45 kg/m²     Physical Exam  Vitals signs and nursing note reviewed.   Constitutional:       Appearance: She is well-developed.   HENT:      Head: Normocephalic.      Right Ear: Hearing, tympanic membrane, ear canal and external ear normal.      Left Ear: Hearing, tympanic membrane, ear canal and external ear normal.      Nose: Nose normal.   Eyes:      Conjunctiva/sclera: Conjunctivae normal.      Pupils: Pupils are equal, round, and  reactive to light.   Neck:      Musculoskeletal: Normal range of motion.   Cardiovascular:      Rate and Rhythm: Normal rate and regular rhythm.      Heart sounds: Normal heart sounds.   Pulmonary:      Effort: Pulmonary effort is normal.      Breath sounds: Wheezing (diffuse, mild) present. No decreased breath sounds, rhonchi or rales.   Musculoskeletal: Normal range of motion.   Skin:     General: Skin is warm and dry.   Neurological:      Mental Status: She is alert.   Psychiatric:         Behavior: Behavior normal.              ASSESSMENT/PLAN    Diagnoses and all orders for this visit:    1. Shortness of breath (Primary)  Comments:  Increase BREO to 200/50, add spiriva respimat 1.25- gave pt sample. Refer for stat chest CT angiogram due to sudden worsening of symptoms. Check labs.  Orders:  -     CT Angiogram Chest With & Without Contrast  -     Comprehensive Metabolic Panel; Future  -     CBC & Differential; Future  -     Fluticasone Furoate-Vilanterol (BREO ELLIPTA) 200-25 MCG/INH inhaler; Inhale 1 puff Daily.  Dispense: 60 each; Refill: 2  -     Tiotropium Bromide Monohydrate (Spiriva Respimat) 1.25 MCG/ACT aerosol solution inhaler; Inhale 2 puffs Daily.  Dispense: 4 g; Refill: 2    2. Mild intermittent reactive airway disease with acute exacerbation  Comments:  Continue BREO, increase dose to 200/50.    3. Cough  Comments:  Will request phenergan w/ codein cough syrup from on call provider to help pt rest at night.   Orders:  -     CT Angiogram Chest With & Without Contrast  -     Comprehensive Metabolic Panel; Future  -     CBC & Differential; Future    4. Pneumonia due to COVID-19 virus  -     CT Angiogram Chest With & Without Contrast  -     Comprehensive Metabolic Panel; Future  -     CBC & Differential; Future    5. Mild intermittent reactive airway disease with acute exacerbation  Comments:  Start BREO inhaler. Switch to xoponex with nebulizer.              Return in about 2 weeks (around 2/9/2021)  for Follow up.

## 2021-01-27 ENCOUNTER — HOSPITAL ENCOUNTER (OUTPATIENT)
Dept: CT IMAGING | Facility: HOSPITAL | Age: 61
Discharge: HOME | End: 2021-01-27

## 2021-01-27 LAB
ALBUMIN SERPL-MCNC: 4.4 G/DL (ref 3.5–5.2)
ALBUMIN/GLOB SERPL: 1.5 G/DL
ALP SERPL-CCNC: 113 U/L (ref 39–117)
ALT SERPL W P-5'-P-CCNC: 24 U/L (ref 1–33)
ANION GAP SERPL CALCULATED.3IONS-SCNC: 12.4 MMOL/L (ref 5–15)
AST SERPL-CCNC: 14 U/L (ref 1–32)
BILIRUB SERPL-MCNC: 0.2 MG/DL (ref 0–1.2)
BUN SERPL-MCNC: 12 MG/DL (ref 8–23)
BUN/CREAT SERPL: 14.6 (ref 7–25)
CALCIUM SPEC-SCNC: 9.6 MG/DL (ref 8.6–10.5)
CHLORIDE SERPL-SCNC: 101 MMOL/L (ref 98–107)
CO2 SERPL-SCNC: 28.6 MMOL/L (ref 22–29)
CREAT SERPL-MCNC: 0.82 MG/DL (ref 0.57–1)
FERRITIN SERPL-MCNC: 70.2 NG/ML (ref 13–150)
GFR SERPL CREATININE-BSD FRML MDRD: 86 ML/MIN/1.73
GLOBULIN UR ELPH-MCNC: 3 GM/DL
GLUCOSE SERPL-MCNC: 74 MG/DL (ref 65–99)
POTASSIUM SERPL-SCNC: 4.2 MMOL/L (ref 3.5–5.2)
PROT SERPL-MCNC: 7.4 G/DL (ref 6–8.5)
SODIUM SERPL-SCNC: 142 MMOL/L (ref 136–145)

## 2021-01-28 DIAGNOSIS — U09.9 POST-COVID-19 CONDITION: Primary | ICD-10-CM

## 2021-01-28 RX ORDER — TIOTROPIUM BROMIDE INHALATION SPRAY 1.56 UG/1
2 SPRAY, METERED RESPIRATORY (INHALATION)
Qty: 4 G | Refills: 2 | Status: SHIPPED | OUTPATIENT
Start: 2021-01-28 | End: 2021-04-05 | Stop reason: SDUPTHER

## 2021-01-28 RX ORDER — PROMETHAZINE HYDROCHLORIDE AND CODEINE PHOSPHATE 6.25; 1 MG/5ML; MG/5ML
5 SYRUP ORAL EVERY 8 HOURS PRN
Qty: 118 ML | Refills: 0 | Status: SHIPPED | OUTPATIENT
Start: 2021-01-28 | End: 2021-02-02 | Stop reason: ALTCHOICE

## 2021-01-28 NOTE — TELEPHONE ENCOUNTER
This patient was hospitalized for Covid pneumonia in December. She has continued to struggle with lingering cough that is impacting her ability to rest at night. I would like to send in some phenergan w/ codeine cough syrup for her to use as needed. Her Warner is clear. If you agree, please send Rx as ordered. Thanks

## 2021-02-01 ENCOUNTER — HOSPITAL ENCOUNTER (OUTPATIENT)
Dept: CT IMAGING | Facility: HOSPITAL | Age: 61
Discharge: HOME | End: 2021-02-01
Admitting: PHYSICIAN ASSISTANT

## 2021-02-01 PROCEDURE — 71275 CT ANGIOGRAPHY CHEST: CPT

## 2021-02-01 PROCEDURE — 0 IOPAMIDOL PER 1 ML: Performed by: PHYSICIAN ASSISTANT

## 2021-02-01 RX ADMIN — IOPAMIDOL 95 ML: 755 INJECTION, SOLUTION INTRAVENOUS at 14:24

## 2021-02-02 ENCOUNTER — OFFICE VISIT (OUTPATIENT)
Dept: INTERNAL MEDICINE | Facility: CLINIC | Age: 61
End: 2021-02-02

## 2021-02-02 VITALS
TEMPERATURE: 97.1 F | HEART RATE: 100 BPM | SYSTOLIC BLOOD PRESSURE: 142 MMHG | WEIGHT: 212.8 LBS | BODY MASS INDEX: 37.7 KG/M2 | OXYGEN SATURATION: 97 % | DIASTOLIC BLOOD PRESSURE: 90 MMHG

## 2021-02-02 DIAGNOSIS — R06.02 PERSISTENT SHORTNESS OF BREATH AFTER COVID-19: ICD-10-CM

## 2021-02-02 DIAGNOSIS — J01.00 ACUTE NON-RECURRENT MAXILLARY SINUSITIS: Primary | ICD-10-CM

## 2021-02-02 DIAGNOSIS — J12.82 PNEUMONIA DUE TO COVID-19 VIRUS: Primary | ICD-10-CM

## 2021-02-02 DIAGNOSIS — U07.1 PNEUMONIA DUE TO COVID-19 VIRUS: Primary | ICD-10-CM

## 2021-02-02 DIAGNOSIS — U09.9 PERSISTENT SHORTNESS OF BREATH AFTER COVID-19: ICD-10-CM

## 2021-02-02 PROCEDURE — 99213 OFFICE O/P EST LOW 20 MIN: CPT | Performed by: PHYSICIAN ASSISTANT

## 2021-02-02 RX ORDER — DOXYCYCLINE 100 MG/1
100 CAPSULE ORAL 2 TIMES DAILY
Qty: 20 CAPSULE | Refills: 0 | OUTPATIENT
Start: 2021-02-02 | End: 2021-04-05

## 2021-02-02 NOTE — TELEPHONE ENCOUNTER
This is the patient we discussed with severe, lingering cough secondary to Covid infection. She tried phenergan with codeine cough syrup without any relief. Ordered tussionex for her to take instead. She will bring leftover codeine cough syrup to pharmacy to exchange. Please send if you agree. Thanks

## 2021-02-02 NOTE — PROGRESS NOTES
Chief Complaint   Patient presents with   • Sore Throat     Acute    • Swollen Glands       Subjective     Diane Denson is a 60 y.o. female.        History of Present Illness     Pt woke up this morning feeling like the glands were swollen in her neck. Better now. Minimal sore throat. Has some sinus pressure and pain. Continues to have post nasal drainage.    She continues to have severe cough. Taking the phenergan with codeine but does not seem to help. Has not heard about Pulmonology appointment yet. Still has good and bad days. Some days has no energy to change into her clothes. Trying to work 3 days a week but this has become a challenge, especially if unable to sleep due to coughing. Feels short of breath but oxygen level remains 95-96%. Had CT of chest yesterday that showed no pneumonia or PE.        Current Outpatient Medications:   •  albuterol sulfate HFA (ProAir HFA) 108 (90 Base) MCG/ACT inhaler, Inhale 2 puffs Every 4 (Four) Hours As Needed for Wheezing., Disp: 18 g, Rfl: 0  •  amitriptyline (ELAVIL) 25 MG tablet, Take 1-2 tablets at night as needed., Disp: 180 tablet, Rfl: 1  •  amLODIPine (NORVASC) 5 MG tablet, Take 1 tablet by mouth Daily., Disp: 30 tablet, Rfl: 5  •  benzonatate (TESSALON) 100 MG capsule, Take 1 capsule by mouth 3 (Three) Times a Day As Needed for Cough., Disp: 30 capsule, Rfl: 0  •  cyclobenzaprine (FLEXERIL) 10 MG tablet, Take 1 tablet by mouth 3 (Three) Times a Day As Needed for Muscle Spasms., Disp: 30 tablet, Rfl: 0  •  Fluticasone Furoate-Vilanterol (BREO ELLIPTA) 200-25 MCG/INH inhaler, Inhale 1 puff Daily., Disp: 60 each, Rfl: 2  •  guaiFENesin (MUCINEX) 600 MG 12 hr tablet, Take 1 tablet by mouth Every 12 (Twelve) Hours., Disp: 30 tablet, Rfl: 0  •  levalbuterol (Xopenex) 1.25 MG/3ML nebulizer solution, Take 1 ampule by nebulization Daily., Disp: 90 mL, Rfl: 1  •  losartan-hydrochlorothiazide (HYZAAR) 50-12.5 MG per tablet, Take 1 tablet by mouth Daily., Disp:  90 tablet, Rfl: 1  •  omeprazole (priLOSEC) 40 MG capsule, 1 capsule by mouth twice daily, Disp: 60 capsule, Rfl: 3  •  oxybutynin XL (Ditropan XL) 5 MG 24 hr tablet, Take 1 tablet by mouth Daily., Disp: 30 tablet, Rfl: 5  •  polyethylene glycol (MIRALAX) packet, Take 17 g by mouth Daily., Disp: 30 each, Rfl: 3  •  Tiotropium Bromide Monohydrate (Spiriva Respimat) 1.25 MCG/ACT aerosol solution inhaler, Inhale 2 puffs Daily., Disp: 4 g, Rfl: 2  •  doxycycline (MONODOX) 100 MG capsule, Take 1 capsule by mouth 2 (Two) Times a Day., Disp: 20 capsule, Rfl: 0  •  HYDROcod Polst-CPM Polst ER (Tussionex Pennkinetic ER) 10-8 MG/5ML ER suspension, Take 5 mL by mouth Every 12 (Twelve) Hours As Needed for Cough., Disp: 120 mL, Rfl: 0     PMFSH  The following portions of the patient's history were reviewed and updated as appropriate: allergies, current medications, past family history, past medical history, past social history, past surgical history and problem list.    Review of Systems   Constitutional: Positive for activity change and fatigue. Negative for appetite change and fever.   HENT: Negative for congestion and rhinorrhea.    Respiratory: Positive for cough and shortness of breath. Negative for chest tightness.    Cardiovascular: Negative for chest pain and palpitations.   Gastrointestinal: Negative for abdominal pain.   Genitourinary: Negative for dysuria.   Musculoskeletal: Negative for arthralgias and myalgias.   Neurological: Negative for dizziness, weakness, light-headedness and headaches.   Psychiatric/Behavioral: Negative for dysphoric mood. The patient is not nervous/anxious.        Objective   /90   Pulse 100   Temp 97.1 °F (36.2 °C)   Wt 96.5 kg (212 lb 12.8 oz)   SpO2 97%   BMI 37.70 kg/m²     Physical Exam  Vitals signs and nursing note reviewed.   Constitutional:       Appearance: She is well-developed. She is ill-appearing.   HENT:      Head: Normocephalic.      Right Ear: Hearing, tympanic  membrane, ear canal and external ear normal.      Left Ear: Hearing, tympanic membrane, ear canal and external ear normal.      Nose: Nose normal.   Eyes:      Conjunctiva/sclera: Conjunctivae normal.      Pupils: Pupils are equal, round, and reactive to light.   Neck:      Musculoskeletal: Normal range of motion.   Cardiovascular:      Rate and Rhythm: Normal rate and regular rhythm.      Heart sounds: Normal heart sounds.   Pulmonary:      Effort: Pulmonary effort is normal.      Breath sounds: Normal breath sounds. No decreased breath sounds, wheezing, rhonchi or rales.   Musculoskeletal: Normal range of motion.   Skin:     General: Skin is warm and dry.   Neurological:      Mental Status: She is alert.      Motor: Tremor present.   Psychiatric:         Behavior: Behavior normal.              ASSESSMENT/PLAN    Diagnoses and all orders for this visit:    1. Acute non-recurrent maxillary sinusitis (Primary)  -     doxycycline (MONODOX) 100 MG capsule; Take 1 capsule by mouth 2 (Two) Times a Day.  Dispense: 20 capsule; Refill: 0    2. Persistent shortness of breath after COVID-19  Comments:  Continue Breo 200/50, spiriva inhaler, albuterol prn. Rx for Tussionex 5 ml po BID #120 ml per on call provider to use instead of phenergan w/ codeine cough syrup. Off work for 2 weeks, she will look into FMLA or short term disability options.    MYLA query complete. Treatment plan to include limited course of prescribed  controlled substance. Risks including addiction, benefits, and alternatives presented to patient.          Return for Next scheduled follow up.

## 2021-02-10 ENCOUNTER — OFFICE VISIT (OUTPATIENT)
Dept: PULMONOLOGY | Facility: CLINIC | Age: 61
End: 2021-02-10

## 2021-02-10 VITALS
SYSTOLIC BLOOD PRESSURE: 126 MMHG | WEIGHT: 216.38 LBS | TEMPERATURE: 96.6 F | OXYGEN SATURATION: 96 % | DIASTOLIC BLOOD PRESSURE: 90 MMHG | RESPIRATION RATE: 18 BRPM | HEART RATE: 89 BPM | BODY MASS INDEX: 38.33 KG/M2

## 2021-02-10 DIAGNOSIS — R06.00 DYSPNEA, UNSPECIFIED TYPE: ICD-10-CM

## 2021-02-10 DIAGNOSIS — R74.8 ELEVATED LIVER ENZYMES: ICD-10-CM

## 2021-02-10 LAB
NT-PROBNP SERPL-MCNC: 22.2 PG/ML (ref 0–900)
TSH SERPL DL<=0.05 MIU/L-ACNC: 3.53 UIU/ML (ref 0.27–4.2)

## 2021-02-10 PROCEDURE — 94726 PLETHYSMOGRAPHY LUNG VOLUMES: CPT | Performed by: INTERNAL MEDICINE

## 2021-02-10 PROCEDURE — 94729 DIFFUSING CAPACITY: CPT | Performed by: INTERNAL MEDICINE

## 2021-02-10 PROCEDURE — 84443 ASSAY THYROID STIM HORMONE: CPT | Performed by: INTERNAL MEDICINE

## 2021-02-10 PROCEDURE — 99204 OFFICE O/P NEW MOD 45 MIN: CPT | Performed by: INTERNAL MEDICINE

## 2021-02-10 PROCEDURE — 36415 COLL VENOUS BLD VENIPUNCTURE: CPT | Performed by: INTERNAL MEDICINE

## 2021-02-10 PROCEDURE — 94618 PULMONARY STRESS TESTING: CPT | Performed by: INTERNAL MEDICINE

## 2021-02-10 PROCEDURE — 83880 ASSAY OF NATRIURETIC PEPTIDE: CPT | Performed by: INTERNAL MEDICINE

## 2021-02-10 PROCEDURE — 94375 RESPIRATORY FLOW VOLUME LOOP: CPT | Performed by: INTERNAL MEDICINE

## 2021-02-10 RX ORDER — PREDNISONE 20 MG/1
TABLET ORAL
Qty: 15 TABLET | Refills: 1 | OUTPATIENT
Start: 2021-02-10 | End: 2021-04-05

## 2021-02-10 NOTE — PROGRESS NOTES
Pulmonary Clinic  Initial Office Evaluation     REFERRING PHYSICIAN:  Cindy Rosa PA    Subjective   Reason for Visit:     Sarcoidosis, Pneumonia due to Covid, Shortness of Breath, Cough, and Wheezing    HPI     11/21/20 Covid symptoms started.   11/24/20 SARS-CoV-2 PCR (+)   11/28/20 [facility] Hospitalized with C19 pneumonitis. At discharge, no need for Home O2. She received DEX (10d) and RDV (5d).   12/19/20 [facility] ED with increasing dyspnea, and wheezing. Improved after nebs and steroids. Discharged with a tapering dose of steroids.     She remains with dyspnea as well as a chronic cough,  especially bad at night time which interrupts her sleep.      She also noticed wheezing.      She does not have any sputum production.      She has been on different inhalers as well as cough medications but has not helped. She took a course of prednisone back in December, when she went to the emergency room, it actually helped.    Her , who also had Covid-19 just days before her, never had to be hospitalized and has recovered completely.    She was diagnosed with cutaneous sarcoidosis in 2015. She took chloroquine for about a year. Lesions resolved, and no further re-activation.     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) (8/8/2016), Overactive bladder (9/2/2016), Pain of both hip joints (8/8/2016), Rectal polyp (4/3/2020), Sarcoidosis of lung (CMS/[facility]AC, located within [facility]) (9/2/2016), and Sarcoidosis of skin (9/2/2016).    PSxH: She has a past surgical history that includes Hysterectomy; Tubal ligation; and Shoulder surgery.      Immunization History   Administered Date(s) Administered   • Tdap 03/02/2020         Medications:     Current Outpatient Medications:   •  albuterol  sulfate HFA (ProAir HFA) 108 (90 Base) MCG/ACT inhaler, Inhale 2 puffs Every 4 (Four) Hours As Needed for Wheezing., Disp: 18 g, Rfl: 0  •  amitriptyline (ELAVIL) 25 MG tablet, Take 1-2 tablets at night as needed., Disp: 180 tablet, Rfl: 1  •  amLODIPine (NORVASC) 5 MG tablet, Take 1 tablet by mouth Daily., Disp: 30 tablet, Rfl: 5  •  benzonatate (TESSALON) 100 MG capsule, Take 1 capsule by mouth 3 (Three) Times a Day As Needed for Cough., Disp: 30 capsule, Rfl: 0  •  cyclobenzaprine (FLEXERIL) 10 MG tablet, Take 1 tablet by mouth 3 (Three) Times a Day As Needed for Muscle Spasms., Disp: 30 tablet, Rfl: 0  •  doxycycline (MONODOX) 100 MG capsule, Take 1 capsule by mouth 2 (Two) Times a Day., Disp: 20 capsule, Rfl: 0  •  Fluticasone Furoate-Vilanterol (BREO ELLIPTA) 200-25 MCG/INH inhaler, Inhale 1 puff Daily., Disp: 60 each, Rfl: 2  •  guaiFENesin (MUCINEX) 600 MG 12 hr tablet, Take 1 tablet by mouth Every 12 (Twelve) Hours., Disp: 30 tablet, Rfl: 0  •  HYDROcod Polst-CPM Polst ER (Tussionex Pennkinetic ER) 10-8 MG/5ML ER suspension, Take 5 mL by mouth Every 12 (Twelve) Hours As Needed for Cough., Disp: 120 mL, Rfl: 0  •  levalbuterol (Xopenex) 1.25 MG/3ML nebulizer solution, Take 1 ampule by nebulization Daily., Disp: 90 mL, Rfl: 1  •  losartan-hydrochlorothiazide (HYZAAR) 50-12.5 MG per tablet, Take 1 tablet by mouth Daily., Disp: 90 tablet, Rfl: 1  •  omeprazole (priLOSEC) 40 MG capsule, 1 capsule by mouth twice daily, Disp: 60 capsule, Rfl: 3  •  oxybutynin XL (Ditropan XL) 5 MG 24 hr tablet, Take 1 tablet by mouth Daily., Disp: 30 tablet, Rfl: 5  •  polyethylene glycol (MIRALAX) packet, Take 17 g by mouth Daily., Disp: 30 each, Rfl: 3  •  Tiotropium Bromide Monohydrate (Spiriva Respimat) 1.25 MCG/ACT aerosol solution inhaler, Inhale 2 puffs Daily., Disp: 4 g, Rfl: 2  •  predniSONE (DELTASONE) 20 MG tablet, Prednisone 2 tablets a day for 5 days, then 1 tablet a day for 5 days., Disp: 15 tablet, Rfl:  1    Allergies: She has No Known Allergies.    FH: She family history includes Arthritis in her father; Diabetes in her father; Heart failure in her father; Hyperlipidemia in her mother; Hypertension in her father and mother; Kidney disease in her father; Obesity in an other family member; Osteoarthritis in her mother; Stroke in her father.    SH: She  reports that she quit smoking about 13 months ago. Her smoking use included cigarettes. She has a 15.00 pack-year smoking history. She has never used smokeless tobacco. She reports that she does not drink alcohol or use drugs.         The patient's relevant past medical, surgical and social history were reviewed and updated in Epic as appropriate.    ROS:   Review of Systems   Constitutional: Positive for unexpected weight change.   HENT: Positive for congestion and trouble swallowing.    Respiratory: Positive for cough, choking, chest tightness, shortness of breath and wheezing.    Cardiovascular: Positive for chest pain and leg swelling.   Endocrine: Positive for polyuria.   Genitourinary: Positive for dysuria.   Musculoskeletal: Positive for neck pain.   Neurological: Positive for tremors, light-headedness and headaches.   Psychiatric/Behavioral: Positive for sleep disturbance.       Objective   OBJECTIVE     Vitals:    02/10/21 1236   BP: 126/90   BP Location: Right arm   Patient Position: Sitting   Cuff Size: Adult   Pulse: 89   Resp: 18   Temp: 96.6 °F (35.9 °C)   SpO2: 96%  Comment: room air at rest   Weight: 98.1 kg (216 lb 6 oz)     Body mass index is 38.33 kg/m².    Physical Examination    Constitutional:  No acute distress.   Neck: No JVD.   Cardiovascular: Normal rate, regular and rhythm.  No murmurs, gallop or rub.   Respiratory: No adventitious sounds.   Extremities: No Edema     Diagnostic Data    Ct Angiogram Chest With & Without Contrast    Result Date: 2/1/2021  No evidence of pulmonary embolism. No acute intrathoracic abnormality.  D:   02/01/2021 E:  02/01/2021  This report was finalized on 2/1/2021 4:37 PM by Dr. Barbara Story MD.      Ct Angiogram Chest    Result Date: 12/19/2020  No acute pulmonary embolus.  Previously noted aread suspicious for multifocal pneumonia have improved. No evidence of worsened or new acute infectious or inflammatory process in the chest.   This report was finalized on 12/19/2020 11:23 AM by Magen Pierson.      Ct Angiogram Chest    Result Date: 11/28/2020  No acute pulmonary embolus.  Evaluation of the lung parenchyma demonstrates some patchy peripheral areas of groundglass opacity, mild but typical in appearance for Covid 19 related pneumonia.   This report was finalized on 11/28/2020 5:05 PM by Magen Pierson.          Spirometry 2/10/2021:  Normal Spirometry.  The maximum voluntary ventilation (MVV) is reduced.  Lung volumes are normal.  The diffusing capacity is substantially reduced.    Exercise Oximetry:  Exercise pulse oximetry showed a SpO2 corrine = 98% on room air. Distance 426 m. Percent of predicted: 99 %    SpO2 Readings from Last 3 Encounters:   02/10/21 96%   02/02/21 97%   01/26/21 98%        Assessment/Plan   ASSESSMENT / PLAN     Assessment:    1. Post Covid-19. Chronic cough, bronchospasm and dyspnea.  a. CT scan has improved, 02/01/21 compared to 11/28/20  2. Irregular Heart Rate  a. Holter monitor ordered by her PCP.  3. Dyslipidemia  4. HTN on CCB, ARB and diuretics. Controlled.  5. Sarcoidosis, cutaneous, 2015. Not active.  6. Anxiety / Depression on Elavil.  7. GERD on PPI.    Plan:  Orders Placed This Encounter   Procedures   • proBNP   • TSH   • QuantiFERON TB Gold   • Adult Transthoracic Echo Complete W/ Cont if Necessary Per Protocol   • Walking Oximetry     Patient Instructions   1. Holter monitor as ordered.  2. Echocardiogram  3. No changes in inhalers  4. Add prednisone for 10 days  5. Labs today to include proBNP and TSH  6. RTC in 2 weeks to re-assess symptoms    Return in  about 2 weeks (around 2/24/2021) for Recheck with Results.    I discussed the diagnosis, evaluation, and  treatment options with the patient and/or appropriate family members.    Thank you very much for allowing me to participate in the care of your patient.    I spent 46 minutes on this date of service. This time includes time spent by me in the following activities:preparing for the visit, reviewing tests, obtaining and/or reviewing a separately obtained history, performing a medically appropriate examination, counseling the patient/family/caregiver, ordering medications, tests, or procedures, and/or documenting information in the medical record.    Med Herring MD, FACP, FCCP, [facility]  Intensive Care Medicine and Pulmonary Medicine     C.C.: Cindy Rosa PA

## 2021-02-10 NOTE — PATIENT INSTRUCTIONS
1. Holter monitor as ordered.  2. Echocardiogram  3. No changes in inhalers  4. Add prednisone for 10 days  5. Labs today to include proBNP and TSH  6. RTC in 2 weeks to re-assess symptoms

## 2021-02-11 DIAGNOSIS — J45.21 MILD INTERMITTENT REACTIVE AIRWAY DISEASE WITH ACUTE EXACERBATION: ICD-10-CM

## 2021-02-11 RX ORDER — LEVALBUTEROL INHALATION SOLUTION 1.25 MG/3ML
1 SOLUTION RESPIRATORY (INHALATION) DAILY
Qty: 90 ML | Refills: 1 | Status: SHIPPED | OUTPATIENT
Start: 2021-02-11

## 2021-02-17 ENCOUNTER — TELEPHONE (OUTPATIENT)
Dept: INTERNAL MEDICINE | Facility: CLINIC | Age: 61
End: 2021-02-17

## 2021-02-17 NOTE — TELEPHONE ENCOUNTER
Patient requested a call back. Patient would like to know if SAAD Rosa would provide her letter that states she should work from home 3 days a week and in the office 2 days a week. Patient stated she is still frequently coughing which makes her coworkers uncomfortable and she is very fatigued. Patient would like to know if this could be mailed to her.    Please call and advise. Patient call back [phone]

## 2021-02-17 NOTE — PROGRESS NOTES
I have reviewed the notes, assessments, and/or procedures performed by Cindy Rosa PA-C, I concur with her/his documentation of Diane Denson.

## 2021-02-18 NOTE — TELEPHONE ENCOUNTER
Does she have the option to continue working from home? If not, I will do the note as she requested. Does there need to be a time frame for how long she will have this schedule?

## 2021-02-18 NOTE — TELEPHONE ENCOUNTER
PT stated that she will need letter for at least 4 days weekly to work from home, pt requesting letter be mailed once completed.

## 2021-02-22 ENCOUNTER — HOSPITAL ENCOUNTER (OUTPATIENT)
Dept: CARDIOLOGY | Facility: HOSPITAL | Age: 61
Discharge: HOME | End: 2021-02-22
Admitting: INTERNAL MEDICINE

## 2021-02-22 VITALS — WEIGHT: 216 LBS | BODY MASS INDEX: 38.27 KG/M2 | HEIGHT: 63 IN

## 2021-02-22 LAB
BH CV ECHO MEAS - AI DEC SLOPE: 302 CM/SEC^2
BH CV ECHO MEAS - AI MAX PG: 43 MMHG
BH CV ECHO MEAS - AI MAX VEL: 328 CM/SEC
BH CV ECHO MEAS - AI P1/2T: 318.1 MSEC
BH CV ECHO MEAS - AO MAX PG (FULL): 5.5 MMHG
BH CV ECHO MEAS - AO MAX PG: 9 MMHG
BH CV ECHO MEAS - AO MEAN PG (FULL): 3 MMHG
BH CV ECHO MEAS - AO MEAN PG: 5 MMHG
BH CV ECHO MEAS - AO ROOT AREA (BSA CORRECTED): 1.6
BH CV ECHO MEAS - AO ROOT AREA: 8 CM^2
BH CV ECHO MEAS - AO ROOT DIAM: 3.2 CM
BH CV ECHO MEAS - AO V2 MAX: 146 CM/SEC
BH CV ECHO MEAS - AO V2 MEAN: 101 CM/SEC
BH CV ECHO MEAS - AO V2 VTI: 29 CM
BH CV ECHO MEAS - ASC AORTA: 3.4 CM
BH CV ECHO MEAS - AVA(I,A): 1.8 CM^2
BH CV ECHO MEAS - AVA(I,D): 1.8 CM^2
BH CV ECHO MEAS - AVA(V,A): 1.8 CM^2
BH CV ECHO MEAS - AVA(V,D): 1.8 CM^2
BH CV ECHO MEAS - BSA(HAYCOCK): 2.1 M^2
BH CV ECHO MEAS - BSA: 2 M^2
BH CV ECHO MEAS - BZI_BMI: 38.3 KILOGRAMS/M^2
BH CV ECHO MEAS - BZI_METRIC_HEIGHT: 160 CM
BH CV ECHO MEAS - BZI_METRIC_WEIGHT: 98 KG
BH CV ECHO MEAS - EDV(CUBED): 76.2 ML
BH CV ECHO MEAS - EDV(MOD-SP2): 78 ML
BH CV ECHO MEAS - EDV(MOD-SP4): 91 ML
BH CV ECHO MEAS - EDV(TEICH): 80.4 ML
BH CV ECHO MEAS - EF(CUBED): 80.9 %
BH CV ECHO MEAS - EF(MOD-BP): 70 %
BH CV ECHO MEAS - EF(MOD-SP2): 66.7 %
BH CV ECHO MEAS - EF(MOD-SP4): 72.5 %
BH CV ECHO MEAS - EF(TEICH): 73.9 %
BH CV ECHO MEAS - ESV(CUBED): 14.5 ML
BH CV ECHO MEAS - ESV(MOD-SP2): 26 ML
BH CV ECHO MEAS - ESV(MOD-SP4): 25 ML
BH CV ECHO MEAS - ESV(TEICH): 21 ML
BH CV ECHO MEAS - FS: 42.5 %
BH CV ECHO MEAS - IVS/LVPW: 1
BH CV ECHO MEAS - IVSD: 0.94 CM
BH CV ECHO MEAS - LA DIMENSION: 3.7 CM
BH CV ECHO MEAS - LA/AO: 1.2
BH CV ECHO MEAS - LAD MAJOR: 4.5 CM
BH CV ECHO MEAS - LAT PEAK E' VEL: 6.4 CM/SEC
BH CV ECHO MEAS - LATERAL E/E' RATIO: 12
BH CV ECHO MEAS - LV DIASTOLIC VOL/BSA (35-75): 45.5 ML/M^2
BH CV ECHO MEAS - LV IVRT: 0.07 SEC
BH CV ECHO MEAS - LV MASS(C)D: 124.2 GRAMS
BH CV ECHO MEAS - LV MASS(C)DI: 62.1 GRAMS/M^2
BH CV ECHO MEAS - LV MAX PG: 3.5 MMHG
BH CV ECHO MEAS - LV MEAN PG: 2 MMHG
BH CV ECHO MEAS - LV SYSTOLIC VOL/BSA (12-30): 12.5 ML/M^2
BH CV ECHO MEAS - LV V1 MAX: 94 CM/SEC
BH CV ECHO MEAS - LV V1 MEAN: 64.1 CM/SEC
BH CV ECHO MEAS - LV V1 VTI: 18.2 CM
BH CV ECHO MEAS - LVIDD: 4.2 CM
BH CV ECHO MEAS - LVIDS: 2.4 CM
BH CV ECHO MEAS - LVLD AP2: 7 CM
BH CV ECHO MEAS - LVLD AP4: 7.9 CM
BH CV ECHO MEAS - LVLS AP2: 5.4 CM
BH CV ECHO MEAS - LVLS AP4: 5.9 CM
BH CV ECHO MEAS - LVOT AREA (M): 2.8 CM^2
BH CV ECHO MEAS - LVOT AREA: 2.8 CM^2
BH CV ECHO MEAS - LVOT DIAM: 1.9 CM
BH CV ECHO MEAS - LVPWD: 0.9 CM
BH CV ECHO MEAS - MED PEAK E' VEL: 7.2 CM/SEC
BH CV ECHO MEAS - MEDIAL E/E' RATIO: 10.6
BH CV ECHO MEAS - MV A MAX VEL: 67.6 CM/SEC
BH CV ECHO MEAS - MV DEC SLOPE: 366 CM/SEC^2
BH CV ECHO MEAS - MV DEC TIME: 0.21 SEC
BH CV ECHO MEAS - MV E MAX VEL: 76.5 CM/SEC
BH CV ECHO MEAS - MV E/A: 1.1
BH CV ECHO MEAS - MV P1/2T MAX VEL: 74.3 CM/SEC
BH CV ECHO MEAS - MV P1/2T: 59.5 MSEC
BH CV ECHO MEAS - MVA P1/2T LCG: 3 CM^2
BH CV ECHO MEAS - MVA(P1/2T): 3.7 CM^2
BH CV ECHO MEAS - PA ACC SLOPE: 924 CM/SEC^2
BH CV ECHO MEAS - PA ACC TIME: 0.08 SEC
BH CV ECHO MEAS - PA MAX PG: 4.1 MMHG
BH CV ECHO MEAS - PA PR(ACCEL): 44.1 MMHG
BH CV ECHO MEAS - PA V2 MAX: 101 CM/SEC
BH CV ECHO MEAS - PAPD(PI EDV): 5 MMHG
BH CV ECHO MEAS - PI END-D VEL: 112 CM/SEC
BH CV ECHO MEAS - PULM A REVS VEL: 29.1 CM/SEC
BH CV ECHO MEAS - PULM DIAS VEL: 52.8 CM/SEC
BH CV ECHO MEAS - PULM S/D: 0.94
BH CV ECHO MEAS - PULM SYS VEL: 49.4 CM/SEC
BH CV ECHO MEAS - RAP SYSTOLE: 8 MMHG
BH CV ECHO MEAS - RVSP: 42 MMHG
BH CV ECHO MEAS - SI(AO): 116.7 ML/M^2
BH CV ECHO MEAS - SI(CUBED): 30.9 ML/M^2
BH CV ECHO MEAS - SI(LVOT): 25.8 ML/M^2
BH CV ECHO MEAS - SI(MOD-SP2): 26 ML/M^2
BH CV ECHO MEAS - SI(MOD-SP4): 33 ML/M^2
BH CV ECHO MEAS - SI(TEICH): 29.7 ML/M^2
BH CV ECHO MEAS - SV(AO): 233.2 ML
BH CV ECHO MEAS - SV(CUBED): 61.7 ML
BH CV ECHO MEAS - SV(LVOT): 51.6 ML
BH CV ECHO MEAS - SV(MOD-SP2): 52 ML
BH CV ECHO MEAS - SV(MOD-SP4): 66 ML
BH CV ECHO MEAS - SV(TEICH): 59.3 ML
BH CV ECHO MEAS - TAPSE (>1.6): 2.4 CM
BH CV ECHO MEAS - TR MAX PG: 34 MMHG
BH CV ECHO MEAS - TR MAX VEL: 293 CM/SEC
BH CV ECHO MEASUREMENTS AVERAGE E/E' RATIO: 11.25
BH CV VAS BP LEFT ARM: NORMAL MMHG
BH CV XLRA - RV BASE: 5 CM
BH CV XLRA - RV LENGTH: 6.8 CM
BH CV XLRA - RV MID: 4.2 CM
BH CV XLRA - TDI S': 17.8 CM/SEC
LEFT ATRIUM VOLUME INDEX: 22 ML/M^2
LEFT ATRIUM VOLUME: 44 ML
LV EF 2D ECHO EST: 70 %

## 2021-02-22 PROCEDURE — 93306 TTE W/DOPPLER COMPLETE: CPT

## 2021-02-22 PROCEDURE — 93306 TTE W/DOPPLER COMPLETE: CPT | Performed by: INTERNAL MEDICINE

## 2021-03-10 ENCOUNTER — APPOINTMENT (OUTPATIENT)
Dept: CARDIOLOGY | Facility: HOSPITAL | Age: 61
End: 2021-03-10

## 2021-03-18 ENCOUNTER — TELEPHONE (OUTPATIENT)
Dept: INTERNAL MEDICINE | Facility: CLINIC | Age: 61
End: 2021-03-18

## 2021-03-18 RX ORDER — METOPROLOL SUCCINATE 25 MG/1
25 TABLET, EXTENDED RELEASE ORAL DAILY
Qty: 30 TABLET | Refills: 1 | Status: SHIPPED | OUTPATIENT
Start: 2021-03-18 | End: 2022-09-19

## 2021-03-19 ENCOUNTER — TELEPHONE (OUTPATIENT)
Dept: INTERNAL MEDICINE | Facility: CLINIC | Age: 61
End: 2021-03-19

## 2021-03-19 NOTE — TELEPHONE ENCOUNTER
----- Message from Sanjuana Armenta MA sent at 3/18/2021  3:09 PM EDT -----  Pt advised of results, she would like to try some medication, she states her heart rate gets really high and she feels like she is going to pass out  ----- Message -----  From: Cindy Rosa PA  Sent: 3/18/2021   3:04 PM EDT  To: Sanjuana Armenta MA    Please let her know that her holter monitor showed some episodes of increased heart rate but was otherwise normal. If this continues to be bothersome, we can start some medication to slow down her heart rate. Hopefully this will improve as she continues to recover from Covid. Let me know if she would like to try this.

## 2021-03-19 NOTE — TELEPHONE ENCOUNTER
Caller: Diane Denson    Relationship: Self    Best call back number: [phone]    Caller requesting test results: PATIENT    What test was performed: SOMETHING FOR THE LUNGS    When was the test performed: 2 WEEKS AGO    [facility]    Additional notes: PATIENT WAS TRYING TO FIND OUT THE RESULTS FROM THE TEST DONE BY THE PULMONOLOGIST.

## 2021-04-06 DIAGNOSIS — G62.9 NEUROPATHY: ICD-10-CM

## 2021-04-06 RX ORDER — AMITRIPTYLINE HYDROCHLORIDE 25 MG/1
TABLET, FILM COATED ORAL
Qty: 180 TABLET | Refills: 0 | Status: SHIPPED | OUTPATIENT
Start: 2021-04-06 | End: 2021-06-28

## 2021-04-06 NOTE — TELEPHONE ENCOUNTER
Last Office Visit: 2/2/21  Next Office Visit:5/4/21    Labs completed in past 6 months? Yes 2/10/21  Labs completed in past year? yes    Last Refill Date:10/27/20  Quantity:180  Refills:1    Pharmacy:     Please review pended refill request for any changes needed on refills or quantities. Thank you!

## 2021-04-07 ENCOUNTER — TELEPHONE (OUTPATIENT)
Dept: PULMONOLOGY | Facility: CLINIC | Age: 61
End: 2021-04-07

## 2021-04-07 NOTE — TELEPHONE ENCOUNTER
There is nothing emergent on the ECHO, she has a follow up next month with Dr Herring and they can discuss it further.

## 2021-04-07 NOTE — TELEPHONE ENCOUNTER
Pt called wanting results of echocardiogram performed in February, was ordered by Dr Herring. She was told to contact this office for results.

## 2021-04-27 ENCOUNTER — TELEPHONE (OUTPATIENT)
Dept: INTERNAL MEDICINE | Facility: CLINIC | Age: 61
End: 2021-04-27

## 2021-04-27 NOTE — TELEPHONE ENCOUNTER
Per Cindy needs to be seen, spoke with patient and she has an appointment on 5/4 and she states she will wait until then, advised if she wanted to be seen sooner call and set up an appointment

## 2021-04-27 NOTE — TELEPHONE ENCOUNTER
PATIENT IS ASKING CAN SHE BE SEEN OR HAVE A VISIT OVER THE PHONE TO GET A REFERRAL FOR THE LONG HAULER PROGRAM THEY HAVE AT  FOR COVID-19. PATIENT WANTED PCP TO BE AWARE THAT SHE PASSED OUT LAST NIGHT FOR THE SECOND TIME AND SHE IS ASSUMING FROM POST COVID. PLEASE RETURN CALL TO [facility] [phone]

## 2021-05-04 ENCOUNTER — OFFICE VISIT (OUTPATIENT)
Dept: INTERNAL MEDICINE | Facility: CLINIC | Age: 61
End: 2021-05-04

## 2021-05-04 ENCOUNTER — LAB (OUTPATIENT)
Dept: LAB | Facility: HOSPITAL | Age: 61
End: 2021-05-04

## 2021-05-04 VITALS
OXYGEN SATURATION: 94 % | TEMPERATURE: 97.5 F | SYSTOLIC BLOOD PRESSURE: 150 MMHG | BODY MASS INDEX: 38.16 KG/M2 | HEIGHT: 63 IN | WEIGHT: 215.4 LBS | DIASTOLIC BLOOD PRESSURE: 108 MMHG | HEART RATE: 91 BPM

## 2021-05-04 DIAGNOSIS — Z00.00 HEALTH CARE MAINTENANCE: ICD-10-CM

## 2021-05-04 DIAGNOSIS — Z12.11 COLON CANCER SCREENING: ICD-10-CM

## 2021-05-04 DIAGNOSIS — Z00.00 HEALTH CARE MAINTENANCE: Primary | ICD-10-CM

## 2021-05-04 DIAGNOSIS — Z78.0 POST-MENOPAUSAL: ICD-10-CM

## 2021-05-04 DIAGNOSIS — Z12.31 ENCOUNTER FOR SCREENING MAMMOGRAM FOR MALIGNANT NEOPLASM OF BREAST: ICD-10-CM

## 2021-05-04 DIAGNOSIS — E03.9 HYPOTHYROIDISM, UNSPECIFIED TYPE: ICD-10-CM

## 2021-05-04 DIAGNOSIS — E78.5 HYPERLIPIDEMIA, UNSPECIFIED HYPERLIPIDEMIA TYPE: ICD-10-CM

## 2021-05-04 DIAGNOSIS — Z86.16 HISTORY OF 2019 NOVEL CORONAVIRUS DISEASE (COVID-19): ICD-10-CM

## 2021-05-04 LAB
ALBUMIN SERPL-MCNC: 4.2 G/DL (ref 3.5–5.2)
ALBUMIN/GLOB SERPL: 1.5 G/DL
ALP SERPL-CCNC: 127 U/L (ref 39–117)
ALT SERPL W P-5'-P-CCNC: 16 U/L (ref 1–33)
ANION GAP SERPL CALCULATED.3IONS-SCNC: 8.5 MMOL/L (ref 5–15)
AST SERPL-CCNC: 16 U/L (ref 1–32)
BASOPHILS # BLD AUTO: 0.03 10*3/MM3 (ref 0–0.2)
BASOPHILS NFR BLD AUTO: 0.7 % (ref 0–1.5)
BILIRUB SERPL-MCNC: 0.3 MG/DL (ref 0–1.2)
BUN SERPL-MCNC: 11 MG/DL (ref 8–23)
BUN/CREAT SERPL: 13.3 (ref 7–25)
CALCIUM SPEC-SCNC: 9.1 MG/DL (ref 8.6–10.5)
CHLORIDE SERPL-SCNC: 98 MMOL/L (ref 98–107)
CHOLEST SERPL-MCNC: 254 MG/DL (ref 0–200)
CO2 SERPL-SCNC: 30.5 MMOL/L (ref 22–29)
CREAT SERPL-MCNC: 0.83 MG/DL (ref 0.57–1)
DEPRECATED RDW RBC AUTO: 43.1 FL (ref 37–54)
EOSINOPHIL # BLD AUTO: 0.53 10*3/MM3 (ref 0–0.4)
EOSINOPHIL NFR BLD AUTO: 12.6 % (ref 0.3–6.2)
ERYTHROCYTE [DISTWIDTH] IN BLOOD BY AUTOMATED COUNT: 13 % (ref 12.3–15.4)
GFR SERPL CREATININE-BSD FRML MDRD: 84 ML/MIN/1.73
GLOBULIN UR ELPH-MCNC: 2.8 GM/DL
GLUCOSE SERPL-MCNC: 76 MG/DL (ref 65–99)
HCT VFR BLD AUTO: 42.5 % (ref 34–46.6)
HDLC SERPL-MCNC: 59 MG/DL (ref 40–60)
HGB BLD-MCNC: 13.5 G/DL (ref 12–15.9)
IMM GRANULOCYTES # BLD AUTO: 0.01 10*3/MM3 (ref 0–0.05)
IMM GRANULOCYTES NFR BLD AUTO: 0.2 % (ref 0–0.5)
LDLC SERPL CALC-MCNC: 162 MG/DL (ref 0–100)
LDLC/HDLC SERPL: 2.69 {RATIO}
LYMPHOCYTES # BLD AUTO: 1.33 10*3/MM3 (ref 0.7–3.1)
LYMPHOCYTES NFR BLD AUTO: 31.7 % (ref 19.6–45.3)
MCH RBC QN AUTO: 29.1 PG (ref 26.6–33)
MCHC RBC AUTO-ENTMCNC: 31.8 G/DL (ref 31.5–35.7)
MCV RBC AUTO: 91.6 FL (ref 79–97)
MONOCYTES # BLD AUTO: 0.55 10*3/MM3 (ref 0.1–0.9)
MONOCYTES NFR BLD AUTO: 13.1 % (ref 5–12)
NEUTROPHILS NFR BLD AUTO: 1.74 10*3/MM3 (ref 1.7–7)
NEUTROPHILS NFR BLD AUTO: 41.7 % (ref 42.7–76)
NRBC BLD AUTO-RTO: 0 /100 WBC (ref 0–0.2)
PLATELET # BLD AUTO: 304 10*3/MM3 (ref 140–450)
PMV BLD AUTO: 10.4 FL (ref 6–12)
POTASSIUM SERPL-SCNC: 3.8 MMOL/L (ref 3.5–5.2)
PROT SERPL-MCNC: 7 G/DL (ref 6–8.5)
RBC # BLD AUTO: 4.64 10*6/MM3 (ref 3.77–5.28)
SODIUM SERPL-SCNC: 137 MMOL/L (ref 136–145)
T3FREE SERPL-MCNC: 3.08 PG/ML (ref 2–4.4)
T4 FREE SERPL-MCNC: 0.88 NG/DL (ref 0.93–1.7)
TRIGL SERPL-MCNC: 181 MG/DL (ref 0–150)
TSH SERPL DL<=0.05 MIU/L-ACNC: 2.27 UIU/ML (ref 0.27–4.2)
VLDLC SERPL-MCNC: 33 MG/DL (ref 5–40)
WBC # BLD AUTO: 4.19 10*3/MM3 (ref 3.4–10.8)

## 2021-05-04 PROCEDURE — 99396 PREV VISIT EST AGE 40-64: CPT | Performed by: PHYSICIAN ASSISTANT

## 2021-05-04 PROCEDURE — 84481 FREE ASSAY (FT-3): CPT

## 2021-05-04 PROCEDURE — 84443 ASSAY THYROID STIM HORMONE: CPT

## 2021-05-04 PROCEDURE — 80053 COMPREHEN METABOLIC PANEL: CPT

## 2021-05-04 PROCEDURE — 85025 COMPLETE CBC W/AUTO DIFF WBC: CPT

## 2021-05-04 PROCEDURE — 84439 ASSAY OF FREE THYROXINE: CPT

## 2021-05-04 PROCEDURE — 80061 LIPID PANEL: CPT

## 2021-05-04 NOTE — PROGRESS NOTES
"Chief Complaint   Patient presents with   • Annual Exam       Subjective     History of Present Illness    Diane Denson is a 60 y.o. female.     The patient is being seen for a health maintenance evaluation.  The last health maintenance was 1 year(s) ago.    Social History  Diane  does not smoke cigarettes.   She drinks no alcohol.  She does not use illicit drugs.    General History  Diane  does have regular dental visits.  She does complain of vision problems. Wears reading glasses. Last eye exam was unknown.  Immunizations are not up to date. The patient needs the following immunizations: pneumovax 23 discussed; shingrix recommended    Lifestyle  Diane  consumes in general, a \"healthy\" diet  .  She exercises intermittently.    Reproductive Health  Diane  is postmenopausal.  She reports periods are nonexistent due to hysterectomy.  She is sexually active. Her contraceptive plan is no method.    Screening  Last pap was unknown by Dr Escalante . History of abnormal pap smear or family history of gyn cancer: none  Last mammogram was  2020, done at [facility]. Personal or family history of abnormal mammograms or breast cancer: aunt with breast cancer  Last colonoscopy was 2012 by [facility]. Family history of colon cancer: none  Last DEXA was unknown.     Pt is currently working in the office one day a week but she would like to go up to 3 days a week, home 2 days a week. She is working full days at home but if she goes in to the office she will need to interact with clients.     Pt does check her BP regularly and last time she checked was 132/87.    Still has occasional severe coughing spells. Has had 2 that caused her to lose consciousness because she could not catch her breath. Had to reschedule her appointment with Pulmonology until June.    Pt would like to be seen at the [facility] at .                Current Outpatient Medications:   •  albuterol sulfate HFA (ProAir HFA) 108 " (90 Base) MCG/ACT inhaler, Inhale 2 puffs Every 4 (Four) Hours As Needed for Wheezing., Disp: 18 g, Rfl: 0  •  amitriptyline (ELAVIL) 25 MG tablet, TAKE 1-2 TABLETS AT NIGHT AS NEEDED., Disp: 180 tablet, Rfl: 0  •  amLODIPine (NORVASC) 5 MG tablet, Take 1 tablet by mouth Daily., Disp: 30 tablet, Rfl: 5  •  cyclobenzaprine (FLEXERIL) 10 MG tablet, Take 1 tablet by mouth 3 (Three) Times a Day As Needed for Muscle Spasms., Disp: 30 tablet, Rfl: 0  •  Fluticasone Furoate-Vilanterol (BREO ELLIPTA) 200-25 MCG/INH inhaler, Inhale 1 puff Daily., Disp: 60 each, Rfl: 2  •  guaiFENesin (MUCINEX) 600 MG 12 hr tablet, Take 1 tablet by mouth Every 12 (Twelve) Hours., Disp: 30 tablet, Rfl: 0  •  levalbuterol (Xopenex) 1.25 MG/3ML nebulizer solution, Take 1 ampule by nebulization Daily., Disp: 90 mL, Rfl: 1  •  losartan-hydrochlorothiazide (HYZAAR) 50-12.5 MG per tablet, Take 1 tablet by mouth Daily., Disp: 90 tablet, Rfl: 1  •  metoprolol succinate XL (Toprol XL) 25 MG 24 hr tablet, Take 1 tablet by mouth Daily., Disp: 30 tablet, Rfl: 1  •  omeprazole (priLOSEC) 40 MG capsule, 1 capsule by mouth twice daily, Disp: 60 capsule, Rfl: 3  •  oxybutynin XL (Ditropan XL) 5 MG 24 hr tablet, Take 1 tablet by mouth Daily., Disp: 30 tablet, Rfl: 5  •  polyethylene glycol (MIRALAX) packet, Take 17 g by mouth Daily., Disp: 30 each, Rfl: 3  •  Tiotropium Bromide Monohydrate (Spiriva Respimat) 1.25 MCG/ACT aerosol solution inhaler, Inhale 2 puffs Daily for 30 days., Disp: 4 g, Rfl: 0     PMFSH  The following portions of the patient's history were reviewed and updated as appropriate: allergies, current medications, past family history, past medical history, past social history, past surgical history and problem list.    Review of Systems   Constitutional: Positive for fatigue. Negative for activity change, appetite change, fever and unexpected weight change.   HENT: Negative for congestion, ear pain, facial swelling, rhinorrhea and sore throat.   "  Eyes: Negative for pain and visual disturbance.   Respiratory: Positive for cough and shortness of breath. Negative for chest tightness and wheezing.    Cardiovascular: Negative for chest pain and palpitations.   Gastrointestinal: Negative for abdominal pain and blood in stool.   Endocrine: Negative.    Genitourinary: Negative for difficulty urinating, dysuria and hematuria.   Musculoskeletal: Negative for arthralgias, joint swelling and myalgias.   Neurological: Negative for dizziness, tremors, seizures, syncope, weakness, light-headedness and headaches.   Hematological: Negative for adenopathy.   Psychiatric/Behavioral: Negative.  Negative for dysphoric mood. The patient is not nervous/anxious.        Objective   BP (!) 150/108   Pulse 91   Temp 97.5 °F (36.4 °C)   Ht 160 cm (63\")   Wt 97.7 kg (215 lb 6.4 oz)   SpO2 94%   BMI 38.16 kg/m²     Physical Exam  Vitals and nursing note reviewed.   Constitutional:       General: She is not in acute distress.     Appearance: She is well-developed. She is not diaphoretic.   HENT:      Head: Normocephalic and atraumatic. Hair is normal.      Right Ear: Hearing, tympanic membrane, ear canal and external ear normal. No decreased hearing noted. No drainage.      Left Ear: Hearing, tympanic membrane, ear canal and external ear normal. No decreased hearing noted.      Nose: No nasal deformity.   Eyes:      General: Lids are normal. Lids are everted, no foreign bodies appreciated.         Right eye: No discharge.         Left eye: No discharge.      Conjunctiva/sclera: Conjunctivae normal.      Pupils: Pupils are equal, round, and reactive to light.   Neck:      Thyroid: No thyromegaly.      Vascular: No JVD.      Trachea: No tracheal deviation.   Cardiovascular:      Rate and Rhythm: Normal rate and regular rhythm.      Pulses: Normal pulses.      Heart sounds: Normal heart sounds. No murmur heard.   No friction rub. No gallop.    Pulmonary:      Effort: Pulmonary effort " is normal. No respiratory distress.      Breath sounds: Normal breath sounds. No wheezing or rales.   Chest:      Chest wall: No tenderness.   Abdominal:      General: Bowel sounds are normal. There is no distension.      Palpations: Abdomen is soft. There is no mass.      Tenderness: There is no abdominal tenderness. There is no guarding or rebound.      Hernia: No hernia is present.   Musculoskeletal:         General: No tenderness or deformity. Normal range of motion.      Cervical back: Normal range of motion and neck supple.   Lymphadenopathy:      Cervical: No cervical adenopathy.   Skin:     General: Skin is warm and dry.      Findings: No erythema or rash.   Neurological:      Mental Status: She is alert and oriented to person, place, and time.      Cranial Nerves: No cranial nerve deficit.      Motor: No abnormal muscle tone.      Coordination: Coordination normal.      Deep Tendon Reflexes: Reflexes are normal and symmetric. Reflexes normal.   Psychiatric:         Behavior: Behavior normal.         Thought Content: Thought content normal.         Judgment: Judgment normal.              ASSESSMENT/PLAN    Diagnoses and all orders for this visit:    1. Health care maintenance (Primary)  Assessment & Plan:  Immunizations:  recommended shingrix and pneumovax 23 vaccine  Eye exam: recommended  Pap Smear: no longer needed d/t hysterectomy  Mammogram: ordered  Dexa: ordered  Colonoscopy: ordered  Labs: fasting labs ordered    Counseled patient regarding multimodal approach with healthy nutrition, healthy sleep, regular physical activity, social activities, counseling, safety measures and medications.         Orders:  -     Lipid Panel; Future  -     CBC & Differential; Future  -     TSH; Future  -     Comprehensive Metabolic Panel; Future    2. Colon cancer screening  -     Ambulatory Referral For Screening Colonoscopy    3. Post-menopausal  -     DEXA Bone Density Axial; Future    4. Hyperlipidemia, unspecified  hyperlipidemia type  -     Lipid Panel; Future  -     Comprehensive Metabolic Panel; Future    5. Hypothyroidism, unspecified type  -     TSH; Future  -     T3, Free; Future  -     T4, Free; Future    6. Encounter for screening mammogram for malignant neoplasm of breast  -     Mammo Screening Digital Tomosynthesis Bilateral With CAD; Future    7. History of 2019 novel coronavirus disease (COVID-19)  Comments:  Will look into [facility] that is treating and evaluating long term Covid symptoms.           Return in about 1 year (around 5/4/2022) for Annual with fasting labs.

## 2021-05-07 NOTE — ASSESSMENT & PLAN NOTE
Immunizations:  recommended shingrix and pneumovax 23 vaccine  Eye exam: recommended  Pap Smear: no longer needed d/t hysterectomy  Mammogram: ordered  Dexa: ordered  Colonoscopy: ordered  Labs: fasting labs ordered    Counseled patient regarding multimodal approach with healthy nutrition, healthy sleep, regular physical activity, social activities, counseling, safety measures and medications.

## 2021-05-11 ENCOUNTER — TELEPHONE (OUTPATIENT)
Dept: INTERNAL MEDICINE | Facility: CLINIC | Age: 61
End: 2021-05-11

## 2021-05-11 DIAGNOSIS — R41.89 BRAIN FOG: Primary | ICD-10-CM

## 2021-05-11 DIAGNOSIS — U09.9 POST-ACUTE COVID-19 SYNDROME: ICD-10-CM

## 2021-05-11 NOTE — TELEPHONE ENCOUNTER
It looks like Dr Allison is in the Neurosciences division at  and is evaluating patients with post Covid encephalitis. Since she does have some brain fog, I will do the referral but I am not sure he will address all of her post Covid symptoms. She should keep her follow up with the Pulmonologist too.

## 2021-05-11 NOTE — TELEPHONE ENCOUNTER
PT NEEDS A REFERRAL FOR THIS CLINIC    POST COVID CLINIC AT    DR LEVY : [phone]    PT DID HAVE ANOTHER SPELL WHERE SHE PASSED OUT AGAIN AND WANTED YOU TO KNOW.

## 2021-05-16 RX ORDER — ROSUVASTATIN CALCIUM 5 MG/1
5 TABLET, COATED ORAL DAILY
Qty: 30 TABLET | Refills: 5 | Status: SHIPPED | OUTPATIENT
Start: 2021-05-16 | End: 2022-09-19 | Stop reason: SDUPTHER

## 2021-05-17 DIAGNOSIS — R79.89 ABNORMAL THYROID BLOOD TEST: Primary | ICD-10-CM

## 2021-06-01 ENCOUNTER — TELEPHONE (OUTPATIENT)
Dept: INTERNAL MEDICINE | Facility: CLINIC | Age: 61
End: 2021-06-01

## 2021-06-01 NOTE — TELEPHONE ENCOUNTER
Caller: Diane Denson    Relationship: Self    Best call back number: [phone]    What medication are you requesting: PREDNISONE    What are your current symptoms: DRY COUGH    How long have you been experiencing symptoms:     Have you had these symptoms before:    [x] Yes  [] No    Have you been treated for these symptoms before:   [x] Yes  [] No    If a prescription is needed, what is your preferred pharmacy and phone number: [id number] - [place], [place] - [address] [place] [address] [phone] [facility] [phone]      Additional notes:

## 2021-06-09 DIAGNOSIS — Z12.11 SCREENING FOR COLON CANCER: Primary | ICD-10-CM

## 2021-06-09 RX ORDER — SODIUM, POTASSIUM,MAG SULFATES 17.5-3.13G
1 SOLUTION, RECONSTITUTED, ORAL ORAL TAKE AS DIRECTED
Qty: 354 ML | Refills: 0 | Status: SHIPPED | OUTPATIENT
Start: 2021-06-09 | End: 2022-09-19

## 2021-06-11 DIAGNOSIS — Z12.11 SCREENING FOR COLON CANCER: Primary | ICD-10-CM

## 2021-06-28 DIAGNOSIS — G62.9 NEUROPATHY: ICD-10-CM

## 2021-06-28 RX ORDER — AMITRIPTYLINE HYDROCHLORIDE 25 MG/1
TABLET, FILM COATED ORAL
Qty: 180 TABLET | Refills: 0 | Status: SHIPPED | OUTPATIENT
Start: 2021-06-28 | End: 2021-10-04

## 2021-06-28 NOTE — TELEPHONE ENCOUNTER
Last Office Visit: 05/04/21  Next Office Visit:05/06/22    Labs completed in past 6 months? yes  Labs completed in past year? yes    Last Refill Date: 04/06/21  Quantity:180  Refills:0    Pharmacy:     Please review pended refill request for any changes needed on refills or quantities. Thank you!

## 2021-07-20 ENCOUNTER — OUTSIDE FACILITY SERVICE (OUTPATIENT)
Dept: GASTROENTEROLOGY | Facility: CLINIC | Age: 61
End: 2021-07-20

## 2021-07-20 PROCEDURE — 88305 TISSUE EXAM BY PATHOLOGIST: CPT | Performed by: INTERNAL MEDICINE

## 2021-07-20 PROCEDURE — 45385 COLONOSCOPY W/LESION REMOVAL: CPT | Performed by: INTERNAL MEDICINE

## 2021-07-21 ENCOUNTER — LAB REQUISITION (OUTPATIENT)
Dept: LAB | Facility: HOSPITAL | Age: 61
End: 2021-07-21

## 2021-07-21 DIAGNOSIS — Z12.11 ENCOUNTER FOR SCREENING FOR MALIGNANT NEOPLASM OF COLON: ICD-10-CM

## 2021-07-21 DIAGNOSIS — K64.8 OTHER HEMORRHOIDS: ICD-10-CM

## 2021-07-21 DIAGNOSIS — Z86.010 PERSONAL HISTORY OF COLONIC POLYPS: ICD-10-CM

## 2021-07-21 DIAGNOSIS — D12.3 BENIGN NEOPLASM OF TRANSVERSE COLON: ICD-10-CM

## 2021-07-21 DIAGNOSIS — K63.5 POLYP OF COLON: ICD-10-CM

## 2021-07-21 DIAGNOSIS — K57.30 DIVERTICULOSIS OF LARGE INTESTINE WITHOUT PERFORATION OR ABSCESS WITHOUT BLEEDING: ICD-10-CM

## 2021-07-22 ENCOUNTER — TELEPHONE (OUTPATIENT)
Dept: GASTROENTEROLOGY | Facility: CLINIC | Age: 61
End: 2021-07-22

## 2021-07-22 NOTE — TELEPHONE ENCOUNTER
I TRIED TO CALL [facility] TO GIVE BIOPSY RESULTS. NO ANSWER; MAILBOX IS FULL. I WILL MAIL RESULTS.

## 2021-07-22 NOTE — TELEPHONE ENCOUNTER
----- Message from Kadeem Cash MD sent at 7/22/2021  1:34 PM EDT -----  Let the patient know the next colonoscopy will be in 5 years.  There was an adenoma type polyp.

## 2021-07-30 ENCOUNTER — APPOINTMENT (OUTPATIENT)
Dept: BONE DENSITY | Facility: HOSPITAL | Age: 61
End: 2021-07-30

## 2021-07-30 ENCOUNTER — HOSPITAL ENCOUNTER (OUTPATIENT)
Dept: MAMMOGRAPHY | Facility: HOSPITAL | Age: 61
Discharge: HOME | End: 2021-07-30

## 2021-08-09 DIAGNOSIS — Z12.31 ENCOUNTER FOR SCREENING MAMMOGRAM FOR MALIGNANT NEOPLASM OF BREAST: ICD-10-CM

## 2021-10-03 DIAGNOSIS — G62.9 NEUROPATHY: ICD-10-CM

## 2021-10-04 RX ORDER — AMITRIPTYLINE HYDROCHLORIDE 25 MG/1
TABLET, FILM COATED ORAL
Qty: 180 TABLET | Refills: 0 | Status: SHIPPED | OUTPATIENT
Start: 2021-10-04 | End: 2022-02-01

## 2021-11-16 DIAGNOSIS — I10 ESSENTIAL HYPERTENSION: ICD-10-CM

## 2021-11-16 RX ORDER — AMLODIPINE BESYLATE 10 MG/1
TABLET ORAL
Qty: 90 TABLET | Refills: 1 | OUTPATIENT
Start: 2021-11-16

## 2021-12-21 ENCOUNTER — TELEPHONE (OUTPATIENT)
Dept: INTERNAL MEDICINE | Facility: CLINIC | Age: 61
End: 2021-12-21

## 2021-12-21 NOTE — TELEPHONE ENCOUNTER
Caller: Diane Denson    Relationship: Self    Best call back number: [phone]    What was the call regarding:   PATIENT WOULD LIKE A COPY OF HER PHYSICAL 05/04/2021 OFFICE VISIT NOTES TO BE MAILED TO HER MAILING ADDRESS

## 2022-01-12 ENCOUNTER — TELEPHONE (OUTPATIENT)
Dept: INTERNAL MEDICINE | Facility: CLINIC | Age: 62
End: 2022-01-12

## 2022-01-12 NOTE — TELEPHONE ENCOUNTER
Caller: Diane Denson    Relationship: Self    Best call back number: [phone]    PATIENT CALLED AND STATED SHE WILL BE SENDING A FORM FOR JONE LUA TO FILL OUT REGARDING HER HAVING A PHYSICAL     PLEASE ADVISE

## 2022-01-20 NOTE — TELEPHONE ENCOUNTER
Patient advised that form has been filled out and faxed, patient requested it be sent to fax [phone] ( [facility] [employer] [place])

## 2022-01-20 NOTE — TELEPHONE ENCOUNTER
PATIENT CALLED TO SEE THAT WE GOT THE FORM AND IF IT IS TO BE COMPLETED SOON.      PLEASE CALL [phone]

## 2022-01-31 DIAGNOSIS — I10 ESSENTIAL HYPERTENSION: ICD-10-CM

## 2022-01-31 DIAGNOSIS — G62.9 NEUROPATHY: ICD-10-CM

## 2022-02-01 DIAGNOSIS — I10 ESSENTIAL HYPERTENSION: ICD-10-CM

## 2022-02-01 RX ORDER — LOSARTAN POTASSIUM AND HYDROCHLOROTHIAZIDE 12.5; 5 MG/1; MG/1
1 TABLET ORAL DAILY
Qty: 90 TABLET | Refills: 1 | Status: SHIPPED | OUTPATIENT
Start: 2022-02-01 | End: 2022-08-19

## 2022-02-01 RX ORDER — AMITRIPTYLINE HYDROCHLORIDE 25 MG/1
TABLET, FILM COATED ORAL
Qty: 180 TABLET | Refills: 0 | Status: SHIPPED | OUTPATIENT
Start: 2022-02-01 | End: 2022-04-21

## 2022-02-01 RX ORDER — AMLODIPINE BESYLATE 10 MG/1
TABLET ORAL
Qty: 90 TABLET | Refills: 1 | Status: SHIPPED | OUTPATIENT
Start: 2022-02-01 | End: 2022-05-18

## 2022-02-01 NOTE — TELEPHONE ENCOUNTER
FOV 5/6/22  LOV 5/4/21    Last labs 5/4/21    Last refill dates 10/4/21  Quantity 90 day supply  Refills     0

## 2022-03-14 ENCOUNTER — TELEPHONE (OUTPATIENT)
Dept: INTERNAL MEDICINE | Facility: CLINIC | Age: 62
End: 2022-03-14

## 2022-03-14 NOTE — TELEPHONE ENCOUNTER
Caller: Diane Denson    Relationship: Self    Best call back number: [phone]    What orders are you requesting (i.e. lab or imaging): PAP SMEAR    In what timeframe would the patient need to come in: WHEN POSSIBLE    Additional notes: PLEASE COORDINATE WITH PATIENT TO GET THIS TEST SCHEDULED.

## 2022-04-06 ENCOUNTER — OFFICE VISIT (OUTPATIENT)
Dept: INTERNAL MEDICINE | Facility: CLINIC | Age: 62
End: 2022-04-06

## 2022-04-06 VITALS
WEIGHT: 217.2 LBS | OXYGEN SATURATION: 90 % | HEART RATE: 104 BPM | DIASTOLIC BLOOD PRESSURE: 80 MMHG | BODY MASS INDEX: 38.48 KG/M2 | TEMPERATURE: 96.9 F | HEIGHT: 63 IN | SYSTOLIC BLOOD PRESSURE: 148 MMHG | RESPIRATION RATE: 16 BRPM

## 2022-04-06 DIAGNOSIS — N30.01 ACUTE CYSTITIS WITH HEMATURIA: Primary | ICD-10-CM

## 2022-04-06 LAB
BILIRUB BLD-MCNC: NEGATIVE MG/DL
CLARITY, POC: ABNORMAL
COLOR UR: ABNORMAL
EXPIRATION DATE: ABNORMAL
GLUCOSE UR STRIP-MCNC: NEGATIVE MG/DL
KETONES UR QL: NEGATIVE
LEUKOCYTE EST, POC: ABNORMAL
Lab: ABNORMAL
NITRITE UR-MCNC: NEGATIVE MG/ML
PH UR: 6 [PH] (ref 5–8)
PROT UR STRIP-MCNC: ABNORMAL MG/DL
RBC # UR STRIP: ABNORMAL /UL
SP GR UR: 1.03 (ref 1–1.03)
UROBILINOGEN UR QL: NORMAL

## 2022-04-06 PROCEDURE — 99213 OFFICE O/P EST LOW 20 MIN: CPT | Performed by: NURSE PRACTITIONER

## 2022-04-06 PROCEDURE — 87086 URINE CULTURE/COLONY COUNT: CPT | Performed by: NURSE PRACTITIONER

## 2022-04-06 PROCEDURE — 81003 URINALYSIS AUTO W/O SCOPE: CPT | Performed by: NURSE PRACTITIONER

## 2022-04-06 RX ORDER — NITROFURANTOIN 25; 75 MG/1; MG/1
100 CAPSULE ORAL EVERY 12 HOURS SCHEDULED
Qty: 14 CAPSULE | Refills: 0 | Status: SHIPPED | OUTPATIENT
Start: 2022-04-06 | End: 2022-04-13

## 2022-04-06 NOTE — PROGRESS NOTES
Chief Complaint   Patient presents with   • Difficulty Urinating     Burning and pressure, x 3 days       History of Present Illness    61 y.o.female presents for uti symptoms    Onset Monday evening. Pressure burning.  No allergies.  Eating drinking ok.  No fever or back pain.  Review of Systems   Constitutional: Negative for chills and fever.   Gastrointestinal: Negative for abdominal pain, nausea and vomiting.   Genitourinary: Positive for dysuria, frequency and urgency. Negative for difficulty urinating, flank pain and hematuria.   Musculoskeletal: Negative for back pain.   Neurological: Negative for light-headedness.         [facility]  The following portions of the patient's history were reviewed and updated as appropriate: allergies, current medications, past family history, past medical history, past social history, past surgical history and problem list.     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   • Insomnia    • Menopausal symptoms 9/2/2016   • Morbid obesity due to excess calories ([facility]) 8/8/2016   • Overactive bladder 9/2/2016   • Pain of both hip joints 8/8/2016   • Rectal polyp 4/3/2020   • Sarcoidosis of lung ([facility]) 9/2/2016   • Sarcoidosis of skin 9/2/2016      No Known Allergies         Current Outpatient Medications:   •  amitriptyline (ELAVIL) 25 MG tablet, TAKE 1-2 TABLETS AT NIGHT AS NEEDED., Disp: 180 tablet, Rfl: 0  •  amLODIPine (NORVASC) 10 MG tablet, TAKE 1 TABLET BY MOUTH EVERY DAY, Disp: 90 tablet, Rfl: 1  •  amLODIPine (NORVASC) 5 MG tablet, Take 1 tablet by mouth Daily., Disp: 30 tablet, Rfl: 5  •  cyclobenzaprine (FLEXERIL) 10 MG tablet, Take 1 tablet by mouth 3 (Three) Times a Day As Needed for Muscle Spasms., Disp: 30 tablet, Rfl: 0  •  Fluticasone Furoate-Vilanterol (BREO ELLIPTA) 200-25 MCG/INH  "inhaler, Inhale 1 puff Daily., Disp: 60 each, Rfl: 2  •  guaiFENesin (MUCINEX) 600 MG 12 hr tablet, Take 1 tablet by mouth Every 12 (Twelve) Hours., Disp: 30 tablet, Rfl: 0  •  losartan-hydrochlorothiazide (HYZAAR) 50-12.5 MG per tablet, Take 1 tablet by mouth Daily., Disp: 90 tablet, Rfl: 1  •  metoprolol succinate XL (Toprol XL) 25 MG 24 hr tablet, Take 1 tablet by mouth Daily., Disp: 30 tablet, Rfl: 1  •  omeprazole (priLOSEC) 40 MG capsule, 1 capsule by mouth twice daily, Disp: 60 capsule, Rfl: 3  •  oxybutynin XL (Ditropan XL) 5 MG 24 hr tablet, Take 1 tablet by mouth Daily., Disp: 30 tablet, Rfl: 5  •  polyethylene glycol (MIRALAX) packet, Take 17 g by mouth Daily., Disp: 30 each, Rfl: 3  •  rosuvastatin (Crestor) 5 MG tablet, Take 1 tablet by mouth Daily., Disp: 30 tablet, Rfl: 5  •  albuterol sulfate HFA (ProAir HFA) 108 (90 Base) MCG/ACT inhaler, Inhale 2 puffs Every 4 (Four) Hours As Needed for Wheezing., Disp: 18 g, Rfl: 0  •  levalbuterol (Xopenex) 1.25 MG/3ML nebulizer solution, Take 1 ampule by nebulization Daily., Disp: 90 mL, Rfl: 1  •  Sodium Sulfate-Mag Sulfate-KCl 5456-448-235 MG tablet, Take 1 tablet by mouth Take As Directed., Disp: 24 tablet, Rfl: 0  •  sodium-potassium-magnesium sulfates (Suprep Bowel Prep Kit) 17.5-3.13-1.6 GM/177ML solution oral solution, Take 1 bottle by mouth Take As Directed. Follow instructions that were mailed to your home. If you didn't receive these call [phone]., Disp: 354 mL, Rfl: 0  •  Tiotropium Bromide Monohydrate (Spiriva Respimat) 1.25 MCG/ACT aerosol solution inhaler, Inhale 2 puffs Daily for 30 days., Disp: 4 g, Rfl: 0    VITALS:  /80   Pulse 104   Temp 96.9 °F (36.1 °C)   Resp 16   Ht 160 cm (62.99\")   Wt 98.5 kg (217 lb 3.2 oz)   SpO2 90%   BMI 38.49 kg/m²     Physical Exam  Vitals reviewed.   Pulmonary:      Effort: Pulmonary effort is normal. No respiratory distress.   Abdominal:      Tenderness: There is no right CVA tenderness or " left CVA tenderness.   Neurological:      Mental Status: She is alert and oriented to person, place, and time.         Result Review :          Contains abnormal data POCT urinalysis dipstick, automated  Order: [id number]   Status: Final result     Visible to patient: Jasmyne (scheduled for 4/7/2022  8:10 AM)     Next appt: 05/06/2022 at 02:45 PM in Internal Medicine (JONE Flood)     Dx: Acute cystitis with hematuria    Specimen Information: Urine         0 Result Notes    Component   Ref Range & Units 18:07   (4/6/22) 2 yr ago   (4/3/20) 2 yr ago   (8/13/19) 4 yr ago   (10/20/17)   Color   Yellow, Straw, Dark Yellow, Jessie Straw  Yellow  Yellow  Yellow    Clarity, UA   Clear Cloudy Abnormal   Clear  Cloudy Abnormal   Clear    Specific Gravity    1.005 - 1.030 1.030  1.020  1.015  1.010    pH, Urine   5.0 - 8.0 6.0  6.5  6.5  8.0    Leukocytes   Negative Small (1+) Abnormal   Moderate (2+) Abnormal   500 Sonya/ul Abnormal   Negative    Comment: 70 Sonya/uL   Nitrite, UA   Negative Negative  Negative  Negative  Negative    Protein, POC   Negative mg/dL 1+ Abnormal   Negative  Negative  Negative    Comment: 30 mg/dL   Glucose, UA   Negative, 1000 mg/dL (3+) mg/dL Negative  Negative  Negative  Negative    Ketones, UA   Negative Negative  Negative  Negative  Negative    Urobilinogen, UA   Normal Normal  Normal  Normal  Normal    Bilirubin   Negative Negative  Negative  Negative  Negative    Blood, UA   Negative 1+ Abnormal   Negative  Negative  Negative    Comment: 25 Puneet/uL          Assessment and Plan    Diagnoses and all orders for this visit:    1. Acute cystitis with hematuria (Primary)  -     nitrofurantoin, macrocrystal-monohydrate, (MACROBID) 100 MG capsule; Take 1 capsule by mouth Every 12 (Twelve) Hours for 7 days.  Dispense: 14 capsule; Refill: 0  -     Urine Culture - Urine, Urine, Clean Catch; Future  -     POCT urinalysis dipstick, automated  -     Urine Culture - Urine, Urine, Clean Catch    increase water  intake.    I discussed the patients findings and my recommendations with patient.  Patient was encouraged to keep me informed of any acute changes, lack of improvement, or any new concerning symptoms.  Patient voiced understanding of all instructions and denied further questions.      Follow Up   Return if symptoms worsen or fail to improve.      Electronically signed by:    LAUREN Quintero  04/06/2022

## 2022-04-07 LAB — BACTERIA SPEC AEROBE CULT: NORMAL

## 2022-04-07 NOTE — PROGRESS NOTES
Normal skin jennifer noted on urine culture. Can stop antibiotic. But if made her symptoms feel better I am ok if she completes.

## 2022-04-21 DIAGNOSIS — G62.9 NEUROPATHY: ICD-10-CM

## 2022-04-21 RX ORDER — AMITRIPTYLINE HYDROCHLORIDE 25 MG/1
TABLET, FILM COATED ORAL
Qty: 180 TABLET | Refills: 0 | Status: SHIPPED | OUTPATIENT
Start: 2022-04-21 | End: 2022-09-19 | Stop reason: SDUPTHER

## 2022-04-21 NOTE — TELEPHONE ENCOUNTER
Last Office Visit: 4/6/22 Colleen  Next Office Visit: 5/6/22    Labs completed in past 6 months? yes  Labs completed in past year? yes    Last Refill Date: 2/1/22  Quantity: 180 tabs  Refills: 0    Pharmacy: [id number] [address] [phone] [facility] [phone]

## 2022-05-17 DIAGNOSIS — I10 ESSENTIAL HYPERTENSION: ICD-10-CM

## 2022-05-17 DIAGNOSIS — G62.9 NEUROPATHY: ICD-10-CM

## 2022-05-17 RX ORDER — AMITRIPTYLINE HYDROCHLORIDE 25 MG/1
TABLET, FILM COATED ORAL
Qty: 180 TABLET | Refills: 0 | OUTPATIENT
Start: 2022-05-17

## 2022-05-17 NOTE — TELEPHONE ENCOUNTER
Last Office Visit: 4/6/22  Next Office Visit: None scheduled    Labs completed in past 6 months? yes  Labs completed in past year? no    Last Refill Date: 2/1/22  Quantity: 90 tab  Refills: 1    Pharmacy: [id number] - [address] [phone] [facility] [phone] FX      Calcium-Channel Blockers Protocol Failed 05/17/2022 09:10 AM   Protocol Details  Normal serum potassium in past 12 months    BP under 140/90 in past year    GFR> 30 ml/min in past 12 months

## 2022-05-18 RX ORDER — AMLODIPINE BESYLATE 10 MG/1
TABLET ORAL
Qty: 90 TABLET | Refills: 0 | Status: SHIPPED | OUTPATIENT
Start: 2022-05-18 | End: 2022-08-18

## 2022-08-18 DIAGNOSIS — I10 ESSENTIAL HYPERTENSION: ICD-10-CM

## 2022-08-18 RX ORDER — AMLODIPINE BESYLATE 10 MG/1
TABLET ORAL
Qty: 90 TABLET | Refills: 0 | Status: SHIPPED | OUTPATIENT
Start: 2022-08-18 | End: 2022-09-19 | Stop reason: SDUPTHER

## 2022-08-19 DIAGNOSIS — I10 ESSENTIAL HYPERTENSION: ICD-10-CM

## 2022-08-19 RX ORDER — LOSARTAN POTASSIUM AND HYDROCHLOROTHIAZIDE 12.5; 5 MG/1; MG/1
TABLET ORAL
Qty: 90 TABLET | Refills: 0 | Status: SHIPPED | OUTPATIENT
Start: 2022-08-19 | End: 2022-09-19 | Stop reason: SDUPTHER

## 2022-09-08 ENCOUNTER — OFFICE VISIT (OUTPATIENT)
Dept: ORTHOPEDIC SURGERY | Facility: CLINIC | Age: 62
End: 2022-09-08

## 2022-09-08 VITALS
WEIGHT: 209 LBS | RESPIRATION RATE: 16 BRPM | BODY MASS INDEX: 37.03 KG/M2 | SYSTOLIC BLOOD PRESSURE: 142 MMHG | OXYGEN SATURATION: 98 % | HEIGHT: 63 IN | DIASTOLIC BLOOD PRESSURE: 92 MMHG

## 2022-09-08 DIAGNOSIS — M25.512 LEFT SHOULDER PAIN, UNSPECIFIED CHRONICITY: ICD-10-CM

## 2022-09-08 DIAGNOSIS — M75.52 BURSITIS OF LEFT SHOULDER: ICD-10-CM

## 2022-09-08 DIAGNOSIS — M24.412 DISLOCATION OF SHOULDER, RECURRENT, LEFT: Primary | ICD-10-CM

## 2022-09-08 DIAGNOSIS — M75.42 IMPINGEMENT SYNDROME OF LEFT SHOULDER: ICD-10-CM

## 2022-09-08 DIAGNOSIS — M25.312 SHOULDER INSTABILITY, LEFT: ICD-10-CM

## 2022-09-08 PROCEDURE — 99204 OFFICE O/P NEW MOD 45 MIN: CPT | Performed by: ORTHOPAEDIC SURGERY

## 2022-09-08 NOTE — PROGRESS NOTES
"                                                                    [facility] [place] [facility] [place] - Riley Lawton MD    Office Visit       Patient Name: Diane Manzanares    Chief Complaint:   Chief Complaint   Patient presents with   • Left Shoulder - Pain       Referring Physician: Lucas Calderon MD - I appreciate the referral    History of Present Illness:   Diane Manzanares is a 61 y.o. female who presents with left body part: shoulder Reason: pain.  Onset:Onset: atraumatic and gradual in nature. The issue has been ongoing for 3 week(s). Pain is a 9/10 on the pain scale. Pain is described as Pain Characterization: burning and throbbing. Associated symptoms include Symptoms: pain, popping and grinding. The pain is worse with working and any movement of the joint; resting, ice, heat, pain medication and/or NSAID and lying down improve the pain. Previous treatments have included: NSAIDS. I have reviewed the patient's history of present illness as noted/entered above.    I have reviewed the patient's past medical history, surgical history, social history, family history, medications, and allergies as noted in the electronic medical record and as noted/entered.  I have reviewed the patient's review of systems as noted/enter and updated as noted in the patient's HPI.    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      LEFT shoulder popped in June and then on 8/18/2022 - went assist a crying child and lifted car seat and swung around -- shoulder \"came out\" and self-reduced and then recurrence of dislocation on 8/23/2022 \"popped out again\"    Contralateral -- history of right dislocation and required surgery 15yr, \"put pins " "in it\" and then 10-15 years later the \"pins came out and put pins back in\"    Enjoys: reading, Bible      Subjective   Subjective      Review of Systems   Musculoskeletal: Positive for arthralgias.        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   • Insomnia    • Menopausal symptoms 2016   • Morbid obesity due to excess calories (HCC) 2016   • Overactive bladder 2016   • Pain of both hip joints 2016   • Rectal polyp 4/3/2020   • Sarcoidosis of lung (HCC) 2016   • Sarcoidosis of skin 2016       Past Surgical History:   Past Surgical History:   Procedure Laterality Date   • HYSTERECTOMY     • SHOULDER SURGERY      Right shoulder   • TUBAL ABDOMINAL LIGATION         Family History:   Family History   Problem Relation Age of Onset   • Hyperlipidemia Mother    • Osteoarthritis Mother    • Hypertension Mother    • Arthritis Father    • Heart failure Father    • Diabetes Father    • Hypertension Father    • Kidney disease Father    • Stroke Father    • Obesity Other        Social History:   Social History     Socioeconomic History   • Marital status:    Tobacco Use   • Smoking status: Former Smoker     Packs/day: 1.00     Years: 15.00     Pack years: 15.00     Types: Cigarettes     Quit date:      Years since quittin.6   • Smokeless tobacco: Never Used   • Tobacco comment: quit 25 yrs ago; smoked 1ppd x 15 yrs    Vaping Use   • Vaping Use: Never used   Substance and Sexual Activity   • Alcohol use: Never   • Drug use: Never   • Sexual activity: Defer       Medications:   Current Outpatient Medications:   •  albuterol sulfate HFA (ProAir HFA) 108 (90 Base) MCG/ACT inhaler, Inhale 2 puffs Every 4 (Four) Hours As Needed for Wheezing., Disp: 18 g, Rfl: 0  •  amitriptyline " (ELAVIL) 25 MG tablet, TAKE 1-2 TABLETS AT NIGHT AS NEEDED., Disp: 180 tablet, Rfl: 0  •  amLODIPine (NORVASC) 10 MG tablet, TAKE 1 TABLET BY MOUTH EVERY DAY, Disp: 90 tablet, Rfl: 0  •  amLODIPine (NORVASC) 5 MG tablet, Take 1 tablet by mouth Daily., Disp: 30 tablet, Rfl: 5  •  cyclobenzaprine (FLEXERIL) 10 MG tablet, Take 1 tablet by mouth 3 (Three) Times a Day As Needed for Muscle Spasms., Disp: 30 tablet, Rfl: 0  •  Fluticasone Furoate-Vilanterol (BREO ELLIPTA) 200-25 MCG/INH inhaler, Inhale 1 puff Daily., Disp: 60 each, Rfl: 2  •  guaiFENesin (MUCINEX) 600 MG 12 hr tablet, Take 1 tablet by mouth Every 12 (Twelve) Hours., Disp: 30 tablet, Rfl: 0  •  levalbuterol (Xopenex) 1.25 MG/3ML nebulizer solution, Take 1 ampule by nebulization Daily., Disp: 90 mL, Rfl: 1  •  losartan-hydrochlorothiazide (HYZAAR) 50-12.5 MG per tablet, TAKE 1 TABLET BY MOUTH EVERY DAY, Disp: 90 tablet, Rfl: 0  •  metoprolol succinate XL (Toprol XL) 25 MG 24 hr tablet, Take 1 tablet by mouth Daily., Disp: 30 tablet, Rfl: 1  •  omeprazole (priLOSEC) 40 MG capsule, 1 capsule by mouth twice daily, Disp: 60 capsule, Rfl: 3  •  oxybutynin XL (Ditropan XL) 5 MG 24 hr tablet, Take 1 tablet by mouth Daily., Disp: 30 tablet, Rfl: 5  •  polyethylene glycol (MIRALAX) packet, Take 17 g by mouth Daily., Disp: 30 each, Rfl: 3  •  rosuvastatin (Crestor) 5 MG tablet, Take 1 tablet by mouth Daily., Disp: 30 tablet, Rfl: 5  •  Sodium Sulfate-Mag Sulfate-KCl 0612-350-197 MG tablet, Take 1 tablet by mouth Take As Directed., Disp: 24 tablet, Rfl: 0  •  sodium-potassium-magnesium sulfates (Suprep Bowel Prep Kit) 17.5-3.13-1.6 GM/177ML solution oral solution, Take 1 bottle by mouth Take As Directed. Follow instructions that were mailed to your home. If you didn't receive these call [phone]., Disp: 354 mL, Rfl: 0  •  Tiotropium Bromide Monohydrate (Spiriva Respimat) 1.25 MCG/ACT aerosol solution inhaler, Inhale 2 puffs Daily for 30 days., Disp: 4 g, Rfl:  "0    Allergies: No Known Allergies    The following portions of the patient's history were reviewed and updated as appropriate: allergies, current medications, past family history, past medical history, past social history, past surgical history and problem list.        Objective    Objective      Vital Signs:   Vitals:    09/08/22 1342   BP: 142/92   Resp: 16   SpO2: 98%   Weight: 94.8 kg (209 lb)   Height: 160 cm (63\")   PainSc:   8       Ortho Exam:  General: no acute distress, comfortable  Vitals reviewed in chart    Musculoskeletal Exam    SIDE: LEFT SHOULDER  Shoulder Exam:    Tenderness: rotator cuff    Range of motion measurements (degrees)  Forward flexion/Abduction/External rotation at side/ER at 90/IR at 90/IR position  Active: Limited due to pain 60/60/30/lateral thigh  Passive: 130/130/60/80/70    Positive Aber testing, positive apprehension    Painful arc of motion: yes  No evidence of septic joint  Pain with forward flexion and abduction greater: 60 degrees  Impingement testing Neer's test - positive/painful  Impingement testing Hawkin's test - positive/painful    Rotator Cuff Testing:  Tenderness to palpation at rotator cuff - YES  Rotator cuff testing Maria Elena's test - painful  Rotator cuff testing External rotation - painful  Rotator cuff testing Lag signs - no  Rotator cuff testing Belly press - no  Pain with abduction great than 90 degrees - yes    Scapular dyskinesis - present, abnormal scapular motion    Long head of the biceps testing:  Razo's test for biceps & Speed's test - painful  Bicipital groove tenderness to palpation/tenderness to palpation of biceps tendon - painful        Baseline hypermobility with Beighton's testing    Results Review:   Imaging Results (Last 24 Hours)     Procedure Component Value Units Date/Time    XR Shoulder 2+ View Left [[id number]] Resulted: 09/08/22 1423     Updated: 09/08/22 1423    Narrative:      Imaging: shoulder x-rays 3 views - AP, axillary, and scapular-Y " x-ray   views    Side: LEFT SHOULDER    Indication for shoulder x-ray 3 views: shoulder pain    Comparison: no comparison views available    Findings: No acute bony pathology. No superior humeral head migration.    The humeral head remains centered in the glenohumeral joint. No evidence   of calcific tendonitis.    No obvious bony Bankart lesion.  Perhaps a small Hill-Sachs lesion.    Chronic subchondral cystic changes to the greater tuberosity and chronic   distal clavicle osteolysis changes consistent with degenerative AC joint   findings.  No obvious fracture noted.    I personally reviewed the above x-rays and discussed with the patient.            Procedures             Assessment / Plan      Assessment/Plan:   Problem List Items Addressed This Visit        Musculoskeletal and Injuries    Dislocation of shoulder, recurrent, left - Primary    Relevant Orders    MRI Shoulder Left Without Contrast    Shoulder instability, left    Relevant Orders    MRI Shoulder Left Without Contrast    Bursitis of left shoulder    Relevant Orders    MRI Shoulder Left Without Contrast    Impingement syndrome of left shoulder    Relevant Orders    MRI Shoulder Left Without Contrast    Left shoulder pain    Relevant Orders    XR Shoulder 2+ View Left (Completed)    MRI Shoulder Left Without Contrast          LEFT SHOULDER  MRI of the shoulder is recommended.  Indication: suspected rotator cuff tear and rule out labral tear, occult fracture  The MRI is critical to evaluate for rotator cuff tearing and will help with possible surgical planning.    LEFT SHOULDER    Diagnoses: as noted above    Mechanism of injury: instability, car seat    Treatment Plan: sling with breaks for ROM, MRI    Work status: modified duty lifting left arm    MMI: not reached    Projection for MMI: TBD      Follow Up: after LEFT SHOULDER MRI        Riley Lawton MD, FAAOS  Orthopedic Surgeon  Fellowship Trained Shoulder and Elbow Surgeon  [facility]  [place]  Orthopedics and Sports Medicine  [address], [address]  [place] [address]    09/08/22  14:27 EDT

## 2022-09-19 ENCOUNTER — OFFICE VISIT (OUTPATIENT)
Dept: INTERNAL MEDICINE | Facility: CLINIC | Age: 62
End: 2022-09-19

## 2022-09-19 VITALS
TEMPERATURE: 98.3 F | DIASTOLIC BLOOD PRESSURE: 80 MMHG | SYSTOLIC BLOOD PRESSURE: 130 MMHG | OXYGEN SATURATION: 98 % | HEART RATE: 80 BPM | BODY MASS INDEX: 37.13 KG/M2 | WEIGHT: 209.6 LBS

## 2022-09-19 DIAGNOSIS — E78.5 HYPERLIPIDEMIA, UNSPECIFIED HYPERLIPIDEMIA TYPE: ICD-10-CM

## 2022-09-19 DIAGNOSIS — E03.9 HYPOTHYROIDISM, UNSPECIFIED TYPE: ICD-10-CM

## 2022-09-19 DIAGNOSIS — N32.81 OVERACTIVE BLADDER: ICD-10-CM

## 2022-09-19 DIAGNOSIS — G62.9 NEUROPATHY: ICD-10-CM

## 2022-09-19 DIAGNOSIS — I10 ESSENTIAL HYPERTENSION: ICD-10-CM

## 2022-09-19 DIAGNOSIS — Z78.0 POST-MENOPAUSAL: ICD-10-CM

## 2022-09-19 DIAGNOSIS — Z23 NEED FOR VACCINATION: ICD-10-CM

## 2022-09-19 DIAGNOSIS — R13.19 ESOPHAGEAL DYSPHAGIA: ICD-10-CM

## 2022-09-19 DIAGNOSIS — Z00.00 HEALTH CARE MAINTENANCE: Primary | ICD-10-CM

## 2022-09-19 DIAGNOSIS — J45.20 MILD INTERMITTENT REACTIVE AIRWAY DISEASE WITHOUT COMPLICATION: ICD-10-CM

## 2022-09-19 PROCEDURE — 0124A PR ADM SARSCOV2 30MCG/0.3ML BST: CPT | Performed by: PHYSICIAN ASSISTANT

## 2022-09-19 PROCEDURE — 99396 PREV VISIT EST AGE 40-64: CPT | Performed by: PHYSICIAN ASSISTANT

## 2022-09-19 PROCEDURE — 91312 COVID-19 (PFIZER) BIVALENT BOOSTER 12+YRS: CPT | Performed by: PHYSICIAN ASSISTANT

## 2022-09-19 RX ORDER — ROSUVASTATIN CALCIUM 5 MG/1
5 TABLET, COATED ORAL DAILY
Qty: 90 TABLET | Refills: 3 | Status: SHIPPED | OUTPATIENT
Start: 2022-09-19

## 2022-09-19 RX ORDER — OXYBUTYNIN CHLORIDE 5 MG/1
5 TABLET, EXTENDED RELEASE ORAL DAILY
Qty: 90 TABLET | Refills: 3 | Status: SHIPPED | OUTPATIENT
Start: 2022-09-19

## 2022-09-19 RX ORDER — OMEPRAZOLE 40 MG/1
CAPSULE, DELAYED RELEASE ORAL
Qty: 180 CAPSULE | Refills: 3 | Status: SHIPPED | OUTPATIENT
Start: 2022-09-19 | End: 2023-01-26 | Stop reason: SDUPTHER

## 2022-09-19 RX ORDER — AMITRIPTYLINE HYDROCHLORIDE 25 MG/1
TABLET, FILM COATED ORAL
Qty: 180 TABLET | Refills: 3 | Status: SHIPPED | OUTPATIENT
Start: 2022-09-19

## 2022-09-19 RX ORDER — ALBUTEROL SULFATE 90 UG/1
2 AEROSOL, METERED RESPIRATORY (INHALATION) EVERY 4 HOURS PRN
Qty: 18 G | Refills: 5 | Status: SHIPPED | OUTPATIENT
Start: 2022-09-19

## 2022-09-19 RX ORDER — AMLODIPINE BESYLATE 10 MG/1
10 TABLET ORAL DAILY
Qty: 90 TABLET | Refills: 3 | Status: SHIPPED | OUTPATIENT
Start: 2022-09-19

## 2022-09-19 RX ORDER — LOSARTAN POTASSIUM AND HYDROCHLOROTHIAZIDE 12.5; 5 MG/1; MG/1
1 TABLET ORAL DAILY
Qty: 90 TABLET | Refills: 3 | Status: SHIPPED | OUTPATIENT
Start: 2022-09-19

## 2022-09-19 NOTE — PROGRESS NOTES
"Chief Complaint   Patient presents with   • Annual Exam       Subjective     History of Present Illness    Diane Manzanares is a 61 y.o. female.     Mrs. Diane Mark is a 61-year-old female who presents today for her annual physical.    The patient had recent dislocation of left shoulder. Reports they want her to do a MRI, but she can't get in to see anyone until 10/14/2022. She states she saw Dr. Lawton. History of right shoulder dislocation that she popped back into place, and this has happened twice. She reports she had surgery when she was 15 years old, and in her mid 20's the had to go back in. The patient states she has loose joints. She hopes to go to physical therapy first, and then discuss surgery.     She notes she is tired periodically, foggy brain, and memory concerns, since Covid infection over a year ago. She reports breathing and cough are better than when she had COVID and still has some wheezing. She denies having COVID again.  The patient reports she is not using the Breo inhaler any more.     She has not tried taking the omeprazole only once a day. She is no longer taking the muscle relaxer. She is still using MiraLAX.   She notes she has not been taking her cholesterol medication.    The patient is being seen for a health maintenance evaluation.  The last health maintenance was 1 year(s) ago.    Social History  Diane  does not smoke cigarettes.   She drinks no alcohol.  She does not use illicit drugs.    General History  Diane  does have regular dental visits.  She does complain of vision problems. Wears glasses to drive. Last eye exam was 2021.  Immunizations are not up to date. The patient needs the following immunizations: Covid booster today; declines flu vaccine; shingrix recommended    Lifestyle  Diane  consumes in general, a \"healthy\" diet  .  She exercises never.    Reproductive Health  Diane  is postmenopausal.  She reports periods are nonexistent since " hysterectomy.  She is sexually active. Her contraceptive plan is no method.    Screening  Last pap was by Dr. Escalante, no longer needed. History of abnormal pap smear or family history of gyn cancer: none  Last mammogram was  8/2021 at [facility], she will schedule. Personal or family history of abnormal mammograms or breast cancer: aunt with breast cancer in her 87  Last colonoscopy was 2021 by Dr. Cash repeat 2026. Family history of colon cancer: none  Last DEXA was unknown.                 Current Outpatient Medications:   •  albuterol sulfate HFA (ProAir HFA) 108 (90 Base) MCG/ACT inhaler, Inhale 2 puffs Every 4 (Four) Hours As Needed for Wheezing., Disp: 18 g, Rfl: 5  •  amitriptyline (ELAVIL) 25 MG tablet, Take 1-2 po qhs, Disp: 180 tablet, Rfl: 3  •  amLODIPine (NORVASC) 10 MG tablet, Take 1 tablet by mouth Daily., Disp: 90 tablet, Rfl: 3  •  levalbuterol (Xopenex) 1.25 MG/3ML nebulizer solution, Take 1 ampule by nebulization Daily., Disp: 90 mL, Rfl: 1  •  losartan-hydrochlorothiazide (HYZAAR) 50-12.5 MG per tablet, Take 1 tablet by mouth Daily., Disp: 90 tablet, Rfl: 3  •  omeprazole (priLOSEC) 40 MG capsule, 1 capsule by mouth twice daily, Disp: 180 capsule, Rfl: 3  •  oxybutynin XL (Ditropan XL) 5 MG 24 hr tablet, Take 1 tablet by mouth Daily., Disp: 90 tablet, Rfl: 3  •  polyethylene glycol (MIRALAX) packet, Take 17 g by mouth Daily., Disp: 30 each, Rfl: 3  •  rosuvastatin (Crestor) 5 MG tablet, Take 1 tablet by mouth Daily., Disp: 90 tablet, Rfl: 3     PMFSH  The following portions of the patient's history were reviewed and updated as appropriate: allergies, current medications, past family history, past medical history, past social history, past surgical history and problem list.    Review of Systems   Constitutional: Negative for activity change, appetite change, fatigue, fever and unexpected weight change.   HENT: Negative for congestion, ear pain, facial swelling, rhinorrhea and sore  throat.    Eyes: Negative for pain and visual disturbance.   Respiratory: Negative for chest tightness, shortness of breath and wheezing.    Cardiovascular: Negative for chest pain and palpitations.   Gastrointestinal: Negative for abdominal pain and blood in stool.   Endocrine: Negative.    Genitourinary: Negative for difficulty urinating, dysuria and hematuria.   Musculoskeletal: Negative for arthralgias, joint swelling and myalgias.   Neurological: Negative for dizziness, tremors, seizures, syncope, weakness, light-headedness and headaches.   Hematological: Negative for adenopathy.   Psychiatric/Behavioral: Negative.  Negative for dysphoric mood. The patient is not nervous/anxious.        Objective   /80   Pulse 80   Temp 98.3 °F (36.8 °C)   Wt 95.1 kg (209 lb 9.6 oz)   SpO2 98%   BMI 37.13 kg/m²     Physical Exam  Vitals and nursing note reviewed.   Constitutional:       General: She is not in acute distress.     Appearance: She is well-developed. She is not diaphoretic.   HENT:      Head: Normocephalic and atraumatic. Hair is normal.      Right Ear: Hearing, tympanic membrane, ear canal and external ear normal. No decreased hearing noted. No drainage.      Left Ear: Hearing, tympanic membrane, ear canal and external ear normal. No decreased hearing noted.      Nose: Nose normal. No nasal deformity.   Eyes:      General: Lids are normal. Lids are everted, no foreign bodies appreciated.         Right eye: No discharge.         Left eye: No discharge.      Conjunctiva/sclera: Conjunctivae normal.      Pupils: Pupils are equal, round, and reactive to light.   Neck:      Thyroid: No thyromegaly.      Vascular: No JVD.      Trachea: No tracheal deviation.   Cardiovascular:      Rate and Rhythm: Normal rate and regular rhythm.      Pulses: Normal pulses.      Heart sounds: Normal heart sounds. No murmur heard.    No friction rub. No gallop.   Pulmonary:      Effort: Pulmonary effort is normal. No  respiratory distress.      Breath sounds: Normal breath sounds. No decreased breath sounds, wheezing, rhonchi or rales.   Chest:      Chest wall: No tenderness.   Abdominal:      General: Bowel sounds are normal. There is no distension.      Palpations: Abdomen is soft. There is no mass.      Tenderness: There is no abdominal tenderness. There is no guarding or rebound.      Hernia: No hernia is present.   Musculoskeletal:         General: No tenderness or deformity. Normal range of motion.      Cervical back: Normal range of motion and neck supple.   Lymphadenopathy:      Cervical: No cervical adenopathy.   Skin:     General: Skin is warm and dry.      Findings: No erythema or rash.   Neurological:      Mental Status: She is alert and oriented to person, place, and time.      Cranial Nerves: No cranial nerve deficit.      Motor: No abnormal muscle tone.      Coordination: Coordination normal.      Deep Tendon Reflexes: Reflexes are normal and symmetric. Reflexes normal.   Psychiatric:         Behavior: Behavior normal.         Thought Content: Thought content normal.         Judgment: Judgment normal.           ASSESSMENT/PLAN    Diagnoses and all orders for this visit:    1. Health care maintenance (Primary)  Assessment & Plan:  Immunizations:  recommended shingrix; Covid booster today; declines influenza vaccine  Eye exam: recommended  Pap Smear: no longer needed d/t hysterectomy  Mammogram: pt will schedule at [facility]  Dexa: ordered  Colonoscopy: done 2021 by Dr. Cash, repeat 2026  Labs: fasting labs ordered    Counseled patient regarding multimodal approach with healthy nutrition, healthy sleep, regular physical activity, social activities, counseling, safety measures and medications.         Orders:  -     CBC & Differential; Future  -     Comprehensive Metabolic Panel; Future  -     Lipid Panel; Future  -     TSH; Future    2. Esophageal dysphagia  Comments:  Trial of decreasing omeprazole to  daily dosing. Will go back to BID if not well tolerated.  Orders:  -     omeprazole (priLOSEC) 40 MG capsule; 1 capsule by mouth twice daily  Dispense: 180 capsule; Refill: 3    3. Mild intermittent reactive airway disease without complication  -     albuterol sulfate HFA (ProAir HFA) 108 (90 Base) MCG/ACT inhaler; Inhale 2 puffs Every 4 (Four) Hours As Needed for Wheezing.  Dispense: 18 g; Refill: 5    4. Essential hypertension  -     amLODIPine (NORVASC) 10 MG tablet; Take 1 tablet by mouth Daily.  Dispense: 90 tablet; Refill: 3  -     losartan-hydrochlorothiazide (HYZAAR) 50-12.5 MG per tablet; Take 1 tablet by mouth Daily.  Dispense: 90 tablet; Refill: 3    5. Neuropathy  -     amitriptyline (ELAVIL) 25 MG tablet; Take 1-2 po qhs  Dispense: 180 tablet; Refill: 3    6. Post-menopausal  -     DEXA Bone Density Axial; Future    7. Hyperlipidemia, unspecified hyperlipidemia type  -     Comprehensive Metabolic Panel; Future  -     Lipid Panel; Future    8. Hypothyroidism, unspecified type  -     TSH; Future    9. Overactive bladder  Comments:  Refilled Ditropan as previously prescribed.  Orders:  -     oxybutynin XL (Ditropan XL) 5 MG 24 hr tablet; Take 1 tablet by mouth Daily.  Dispense: 90 tablet; Refill: 3    10. Need for vaccination  -     COVID-19 Bivalent Booster (Pfizer) 12+yrs    Other orders  -     rosuvastatin (Crestor) 5 MG tablet; Take 1 tablet by mouth Daily.  Dispense: 90 tablet; Refill: 3           Return in about 1 year (around 9/19/2023) for Annual with fasting labs.     Transcribed from ambient dictation for JONE Flood by Linda Rasmussen.  09/19/22   14:21 EDT    Patient verbalized consent to the visit recording.  I have personally performed the services described in this document as transcribed by the above individual, and it is both accurate and complete.  JONE Flood  9/19/2022  22:28 EDT

## 2022-09-19 NOTE — ASSESSMENT & PLAN NOTE
Immunizations:  recommended shingrix; Covid booster today; declines influenza vaccine  Eye exam: recommended  Pap Smear: no longer needed d/t hysterectomy  Mammogram: pt will schedule at [facility]  Dexa: ordered  Colonoscopy: done 2021 by Dr. Cash, repeat 2026  Labs: fasting labs ordered    Counseled patient regarding multimodal approach with healthy nutrition, healthy sleep, regular physical activity, social activities, counseling, safety measures and medications.

## 2022-10-14 ENCOUNTER — APPOINTMENT (OUTPATIENT)
Dept: MRI IMAGING | Facility: HOSPITAL | Age: 62
End: 2022-10-14

## 2022-10-16 ENCOUNTER — HOSPITAL ENCOUNTER (OUTPATIENT)
Dept: MRI IMAGING | Facility: HOSPITAL | Age: 62
Discharge: HOME | End: 2022-10-16
Admitting: ORTHOPAEDIC SURGERY

## 2022-10-16 DIAGNOSIS — M75.42 IMPINGEMENT SYNDROME OF LEFT SHOULDER: ICD-10-CM

## 2022-10-16 DIAGNOSIS — M25.512 LEFT SHOULDER PAIN, UNSPECIFIED CHRONICITY: ICD-10-CM

## 2022-10-16 DIAGNOSIS — M24.412 DISLOCATION OF SHOULDER, RECURRENT, LEFT: ICD-10-CM

## 2022-10-16 DIAGNOSIS — M75.52 BURSITIS OF LEFT SHOULDER: ICD-10-CM

## 2022-10-16 DIAGNOSIS — M25.312 SHOULDER INSTABILITY, LEFT: ICD-10-CM

## 2022-10-16 PROCEDURE — 73221 MRI JOINT UPR EXTREM W/O DYE: CPT

## 2022-10-18 ENCOUNTER — OFFICE VISIT (OUTPATIENT)
Dept: ORTHOPEDIC SURGERY | Facility: CLINIC | Age: 62
End: 2022-10-18

## 2022-10-18 VITALS
BODY MASS INDEX: 35.44 KG/M2 | SYSTOLIC BLOOD PRESSURE: 130 MMHG | HEIGHT: 63 IN | WEIGHT: 200 LBS | DIASTOLIC BLOOD PRESSURE: 86 MMHG

## 2022-10-18 DIAGNOSIS — S46.012A TRAUMATIC INCOMPLETE TEAR OF LEFT ROTATOR CUFF, INITIAL ENCOUNTER: Primary | ICD-10-CM

## 2022-10-18 DIAGNOSIS — M75.52 BURSITIS OF LEFT SHOULDER: ICD-10-CM

## 2022-10-18 DIAGNOSIS — M25.512 LEFT SHOULDER PAIN, UNSPECIFIED CHRONICITY: ICD-10-CM

## 2022-10-18 DIAGNOSIS — M75.42 IMPINGEMENT SYNDROME OF LEFT SHOULDER: ICD-10-CM

## 2022-10-18 PROCEDURE — 99214 OFFICE O/P EST MOD 30 MIN: CPT | Performed by: ORTHOPAEDIC SURGERY

## 2022-10-18 RX ORDER — MELOXICAM 7.5 MG/1
TABLET ORAL
Qty: 30 TABLET | Refills: 1 | Status: SHIPPED | OUTPATIENT
Start: 2022-10-18 | End: 2023-01-09

## 2022-10-18 RX ORDER — METHYLPREDNISOLONE 4 MG/1
TABLET ORAL
Qty: 1 EACH | Refills: 0 | Status: SHIPPED | OUTPATIENT
Start: 2022-10-18 | End: 2023-01-26

## 2022-10-18 NOTE — PROGRESS NOTES
"                                                                [facility] Up       Office Follow Up Visit       Patient Name: Diane Manzanares    Chief Complaint:   Chief Complaint   Patient presents with   • MRI Follow-Up     MRI SHOULDER LEFT WO CONTRAST 10/16       Referring Physician: No ref. provider found    History of Present Illness:   It has been 1  month(s) since Diane Manzanares's last visit. Diaen Manzanares returns to clinic today for F/U: follow-up of leftBody Part: shoulderReason: pain. The issue has been ongoing for 1 month(s). Diane Manzanares rates HIS/HER: herpain at 10/10 on the pain scale. Previous/current treatments: NSAIDS. Current symptoms:Symptoms: pain and popping. The pain is worse with working; ice and heat improves the pain. Overall, he/she: sheis doing the same.  I have reviewed the patient's history of present illness as noted/entered above.    I have reviewed the patient's past medical history, surgical history, social history, family history, medications, and allergies as noted in the electronic medical record and as noted/entered.  I have reviewed the patient's review of systems as noted/enter and updated as noted in the patient's HPI.    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  Date of Injury at Work: 8/18/2022 and 8/23/2022  Mechanism of Injury at Work: lifting injury as noted, car seat  Current Work Status: still working in the office  TREATMENTS: sling  Diagnostic Tests: [facility]        LEFT shoulder popped in June and then on 8/18/2022 - went assist a crying child and lifted car seat and swung around -- shoulder \"came out\" and self-reduced and then recurrence of dislocation on 8/23/2022 \"popped out again\"     Contralateral -- history of right dislocation and required surgery 15yr, \"put pins in it\" and then 10-15 years later the \"pins came out and put " "pins back in\"     Enjoys: reading, Bible      10/18/2022:  Left shoulder pain persists.    Left shoulder MRI completed    She continues report 10 out of 10 pain at this time reports that it is unchanged    Working with restriction    I counseled on the MRI findings but the findings do not appear to be compatible with instability.  She does have some associated rotator cuff pathology but no obvious Hill-Sachs lesion or acute glenoid/Bankart type lesion.      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   • Insomnia    • Menopausal symptoms 2016   • Morbid obesity due to excess calories ([facility]) 2016   • Overactive bladder 2016   • Pain of both hip joints 2016   • Rectal polyp 4/3/2020   • Sarcoidosis of lung ([facility]) 2016   • Sarcoidosis of skin 2016       Past Surgical History:   Past Surgical History:   Procedure Laterality Date   • HYSTERECTOMY     • SHOULDER SURGERY      Right shoulder   • TUBAL ABDOMINAL LIGATION         Family History:   Family History   Problem Relation Age of Onset   • Hyperlipidemia Mother    • Osteoarthritis Mother    • Hypertension Mother    • Arthritis Father    • Heart failure Father    • Diabetes Father    • Hypertension Father    • Kidney disease Father    • Stroke Father    • Obesity Other        Social History:   Social History     Socioeconomic History   • Marital status:    Tobacco Use   • Smoking status: Former     Packs/day: 1.00     Years: 15.00     Pack years: 15.00     Types: Cigarettes     Quit date:      Years since quittin.7   • Smokeless tobacco: Never   • Tobacco comments:     quit 25 yrs ago; smoked 1ppd x 15 yrs    Vaping Use   • Vaping Use: Never used   Substance and Sexual " "Activity   • Alcohol use: Never   • Drug use: Never   • Sexual activity: Defer       Medications:   Current Outpatient Medications:   •  albuterol sulfate HFA (ProAir HFA) 108 (90 Base) MCG/ACT inhaler, Inhale 2 puffs Every 4 (Four) Hours As Needed for Wheezing., Disp: 18 g, Rfl: 5  •  amitriptyline (ELAVIL) 25 MG tablet, Take 1-2 po qhs, Disp: 180 tablet, Rfl: 3  •  amLODIPine (NORVASC) 10 MG tablet, Take 1 tablet by mouth Daily., Disp: 90 tablet, Rfl: 3  •  levalbuterol (Xopenex) 1.25 MG/3ML nebulizer solution, Take 1 ampule by nebulization Daily., Disp: 90 mL, Rfl: 1  •  losartan-hydrochlorothiazide (HYZAAR) 50-12.5 MG per tablet, Take 1 tablet by mouth Daily., Disp: 90 tablet, Rfl: 3  •  meloxicam (MOBIC) 7.5 MG tablet, 1 Oral Daily with food., Disp: 30 tablet, Rfl: 1  •  methylPREDNISolone (MEDROL) 4 MG dose pack, Use as directed by package instructions, Disp: 1 each, Rfl: 0  •  omeprazole (priLOSEC) 40 MG capsule, 1 capsule by mouth twice daily, Disp: 180 capsule, Rfl: 3  •  oxybutynin XL (Ditropan XL) 5 MG 24 hr tablet, Take 1 tablet by mouth Daily., Disp: 90 tablet, Rfl: 3  •  polyethylene glycol (MIRALAX) packet, Take 17 g by mouth Daily., Disp: 30 each, Rfl: 3  •  rosuvastatin (Crestor) 5 MG tablet, Take 1 tablet by mouth Daily., Disp: 90 tablet, Rfl: 3    Allergies: No Known Allergies    The following portions of the patient's history were reviewed and updated as appropriate: allergies, current medications, past family history, past medical history, past social history, past surgical history and problem list.        Objective    Objective      Vital Signs:   Vitals:    10/18/22 1306   BP: 130/86   Weight: 90.7 kg (200 lb)   Height: 160 cm (63\")   PainSc: 10-Worst pain ever       Ortho Exam:  Left shoulder still demonstrates a fairly significant pain response and pain response limits active motion but encouragement noted with passive motion and Jobes testing today.  No lag signs today.  No obvious " contracture but again significantly limited on clinical exam    Results Review:  Imaging Results (Last 24 Hours)     ** No results found for the last 24 hours. **          MRI Shoulder Left Without Contrast    Result Date: 10/17/2022   Tendinosis of the supraspinatus and likely moderate to high-grade articular-sided and interstitial tearing of the posterior distal fibers at the footprint.  Tendinosis of the long head of biceps tendon.  Additional findings as above.  This report was finalized on 10/17/2022 9:00 AM by Alexis Michaels MD.        I personally reviewed the MRI and reviewed with the patient she does have high-grade articular sided tearing and interstitial tearing of the supraspinatus.  Long head of biceps tendinosis.  No obvious evidence however of meli dislocation released from a radiographic perspective although does give historical concerns for this.    Procedures            Assessment / Plan      Assessment/Plan:   Problem List Items Addressed This Visit        Musculoskeletal and Injuries    Bursitis of left shoulder    Relevant Medications    methylPREDNISolone (MEDROL) 4 MG dose pack    meloxicam (MOBIC) 7.5 MG tablet    Other Relevant Orders    Ambulatory Referral to Physical Therapy Evaluate and treat, Ortho    Impingement syndrome of left shoulder    Relevant Medications    methylPREDNISolone (MEDROL) 4 MG dose pack    meloxicam (MOBIC) 7.5 MG tablet    Other Relevant Orders    Ambulatory Referral to Physical Therapy Evaluate and treat, Ortho    Left shoulder pain    Relevant Medications    methylPREDNISolone (MEDROL) 4 MG dose pack    meloxicam (MOBIC) 7.5 MG tablet    Other Relevant Orders    Ambulatory Referral to Physical Therapy Evaluate and treat, Ortho    Traumatic incomplete tear of left rotator cuff - Primary    Relevant Medications    methylPREDNISolone (MEDROL) 4 MG dose pack    meloxicam (MOBIC) 7.5 MG tablet    Other Relevant Orders    Ambulatory Referral to Physical Therapy  Evaluate and treat, Ortho       Left shoulder partial intrasubstance tearing perhaps some articular sided tearing.  Counseled that these findings do not necessarily indicate the need for surgery.  She was relieved to hear that as well.  No obvious occult fracture noted on the MRI.  Significant pain response is still noted.  I think therapy can be very critical here although we will have to work to manage her pain during those sessions I think she can work to regain her motion.  Counseled on operative versus nonoperative measures and given partial tearing nonoperative measures recommended at this time.    Completed the sling at this point, modified duty 5 pound lifting restriction no overhead.  MMI to be determined unclear at this time    Follow Up: 6 weeks to reassess response to conservative course      Riley Lawton MD, FAAOS  Orthopedic Surgeon  Fellowship Trained Shoulder and Elbow Surgeon  [facility]  Orthopedics and Sports Medicine  [address] [place] [address], [address]  [place] [address]    10/18/22  13:26 EDT

## 2022-11-11 ENCOUNTER — APPOINTMENT (OUTPATIENT)
Dept: MRI IMAGING | Facility: HOSPITAL | Age: 62
End: 2022-11-11

## 2022-11-14 ENCOUNTER — APPOINTMENT (OUTPATIENT)
Dept: BONE DENSITY | Facility: HOSPITAL | Age: 62
End: 2022-11-14

## 2023-01-07 DIAGNOSIS — M75.52 BURSITIS OF LEFT SHOULDER: ICD-10-CM

## 2023-01-07 DIAGNOSIS — S46.012A TRAUMATIC INCOMPLETE TEAR OF LEFT ROTATOR CUFF, INITIAL ENCOUNTER: ICD-10-CM

## 2023-01-07 DIAGNOSIS — M25.512 LEFT SHOULDER PAIN, UNSPECIFIED CHRONICITY: ICD-10-CM

## 2023-01-07 DIAGNOSIS — M75.42 IMPINGEMENT SYNDROME OF LEFT SHOULDER: ICD-10-CM

## 2023-01-09 RX ORDER — MELOXICAM 7.5 MG/1
TABLET ORAL
Qty: 30 TABLET | Refills: 1 | Status: SHIPPED | OUTPATIENT
Start: 2023-01-09

## 2023-01-26 ENCOUNTER — OFFICE VISIT (OUTPATIENT)
Dept: INTERNAL MEDICINE | Facility: CLINIC | Age: 63
End: 2023-01-26
Payer: COMMERCIAL

## 2023-01-26 VITALS
WEIGHT: 222.2 LBS | HEART RATE: 87 BPM | SYSTOLIC BLOOD PRESSURE: 128 MMHG | OXYGEN SATURATION: 96 % | DIASTOLIC BLOOD PRESSURE: 94 MMHG | BODY MASS INDEX: 39.36 KG/M2

## 2023-01-26 DIAGNOSIS — R13.19 ESOPHAGEAL DYSPHAGIA: ICD-10-CM

## 2023-01-26 DIAGNOSIS — R05.3 CHRONIC COUGH: Primary | ICD-10-CM

## 2023-01-26 PROCEDURE — 99214 OFFICE O/P EST MOD 30 MIN: CPT | Performed by: PHYSICIAN ASSISTANT

## 2023-01-26 RX ORDER — LORATADINE 10 MG/1
10 TABLET ORAL DAILY
Qty: 30 TABLET | Refills: 3 | Status: SHIPPED | OUTPATIENT
Start: 2023-01-26

## 2023-01-26 RX ORDER — BENZONATATE 200 MG/1
200 CAPSULE ORAL 3 TIMES DAILY PRN
Qty: 30 CAPSULE | Refills: 0 | Status: SHIPPED | OUTPATIENT
Start: 2023-01-26 | End: 2023-02-05

## 2023-01-26 RX ORDER — FLUTICASONE FUROATE AND VILANTEROL 200; 25 UG/1; UG/1
1 POWDER RESPIRATORY (INHALATION)
Qty: 28 EACH | Refills: 3 | Status: SHIPPED | OUTPATIENT
Start: 2023-01-26 | End: 2023-01-27

## 2023-01-26 RX ORDER — SUCRALFATE ORAL 1 G/10ML
1 SUSPENSION ORAL 4 TIMES DAILY PRN
Qty: 414 ML | Refills: 0 | Status: SHIPPED | OUTPATIENT
Start: 2023-01-26

## 2023-01-26 RX ORDER — OMEPRAZOLE 40 MG/1
CAPSULE, DELAYED RELEASE ORAL
Qty: 180 CAPSULE | Refills: 3 | Status: SHIPPED | OUTPATIENT
Start: 2023-01-26

## 2023-01-26 NOTE — PROGRESS NOTES
Chief Complaint   Patient presents with   • Food getting stuck   • Burning in chest after eating     Acute    • Cough       Subjective     Diane Manzanares is a 62 y.o. female.        History of Present Illness     The patient presents today for a follow-up.    The patient states she is still coughing. The cough has become all day and all night. The cough has disrupted her whole life. The cough is the same from when she had COVID-19. The cough did not get better when she was last seen in 09/2022. The cough got worse approximately 3 months ago. She is using cough drops. She is not taking any antihistamine. She has not needed to use the nebulizer or the albuterol inhaler. She has chest tightness and shortness of breath. She has tried Tessalon Perles and Claritin in the past for her cough.     The patient is having some reflux or esophageal issues. She does not eat anything without having to regurgitate in her throat. She has not been taking omeprazole. She had esophageal dilation in 11/2019. She had ulcerative esophagitis and gastritis and they increased her PPI to twice a day.      Current Outpatient Medications:   •  albuterol sulfate HFA (ProAir HFA) 108 (90 Base) MCG/ACT inhaler, Inhale 2 puffs Every 4 (Four) Hours As Needed for Wheezing., Disp: 18 g, Rfl: 5  •  amitriptyline (ELAVIL) 25 MG tablet, Take 1-2 po qhs, Disp: 180 tablet, Rfl: 3  •  amLODIPine (NORVASC) 10 MG tablet, Take 1 tablet by mouth Daily., Disp: 90 tablet, Rfl: 3  •  levalbuterol (Xopenex) 1.25 MG/3ML nebulizer solution, Take 1 ampule by nebulization Daily., Disp: 90 mL, Rfl: 1  •  losartan-hydrochlorothiazide (HYZAAR) 50-12.5 MG per tablet, Take 1 tablet by mouth Daily., Disp: 90 tablet, Rfl: 3  •  meloxicam (MOBIC) 7.5 MG tablet, TAKE 1 TABLET BY MOUTH DAILY WITH FOOD, Disp: 30 tablet, Rfl: 1  •  omeprazole (priLOSEC) 40 MG capsule, 1 capsule by mouth twice daily, Disp: 180 capsule, Rfl: 3  •  oxybutynin XL (Ditropan XL) 5 MG 24 hr  tablet, Take 1 tablet by mouth Daily., Disp: 90 tablet, Rfl: 3  •  polyethylene glycol (MIRALAX) packet, Take 17 g by mouth Daily., Disp: 30 each, Rfl: 3  •  rosuvastatin (Crestor) 5 MG tablet, Take 1 tablet by mouth Daily., Disp: 90 tablet, Rfl: 3  •  benzonatate (TESSALON) 200 MG capsule, Take 1 capsule by mouth 3 (Three) Times a Day As Needed for Cough for up to 10 days., Disp: 30 capsule, Rfl: 0  •  Fluticasone-Salmeterol (ADVAIR/WIXELA) 250-50 MCG/ACT DISKUS, Inhale 1 puff 2 (Two) Times a Day., Disp: 60 each, Rfl: 3  •  loratadine (Claritin) 10 MG tablet, Take 1 tablet by mouth Daily., Disp: 30 tablet, Rfl: 3  •  sucralfate (Carafate) 1 GM/10ML suspension, Take 10 mL by mouth 4 (Four) Times a Day As Needed (gastritis)., Disp: 414 mL, Rfl: 0     PMFSH  The following portions of the patient's history were reviewed and updated as appropriate: allergies, current medications, past family history, past medical history, past social history, past surgical history and problem list.    Review of Systems   Constitutional: Negative for activity change, appetite change and fatigue.   HENT: Negative for congestion and rhinorrhea.    Respiratory: Positive for cough. Negative for chest tightness and shortness of breath.    Cardiovascular: Negative for chest pain and palpitations.   Gastrointestinal: Positive for abdominal pain and nausea. Negative for constipation, diarrhea and vomiting.   Genitourinary: Negative for dysuria.   Musculoskeletal: Negative for arthralgias and myalgias.   Neurological: Negative for dizziness, weakness, light-headedness and headaches.   Psychiatric/Behavioral: Negative for dysphoric mood. The patient is not nervous/anxious.        Objective   /94   Pulse 87   Wt 101 kg (222 lb 3.2 oz)   SpO2 96%   BMI 39.36 kg/m²     Physical Exam  Vitals and nursing note reviewed.   Constitutional:       Appearance: She is well-developed.   HENT:      Head: Normocephalic.      Right Ear: Hearing,  tympanic membrane, ear canal and external ear normal.      Left Ear: Hearing, tympanic membrane, ear canal and external ear normal.      Nose: Nose normal.   Eyes:      Conjunctiva/sclera: Conjunctivae normal.      Pupils: Pupils are equal, round, and reactive to light.   Cardiovascular:      Rate and Rhythm: Normal rate and regular rhythm.      Heart sounds: Normal heart sounds.   Pulmonary:      Effort: Pulmonary effort is normal.      Breath sounds: Normal breath sounds. No decreased breath sounds, wheezing, rhonchi or rales.   Abdominal:      General: Bowel sounds are normal.      Palpations: Abdomen is soft.      Tenderness: There is abdominal tenderness in the epigastric area. There is no right CVA tenderness, left CVA tenderness, guarding or rebound.   Musculoskeletal:         General: Normal range of motion.      Cervical back: Normal range of motion.   Skin:     General: Skin is warm and dry.   Neurological:      Mental Status: She is alert.   Psychiatric:         Behavior: Behavior normal.             ASSESSMENT/PLAN    Diagnoses and all orders for this visit:    1. Chronic cough (Primary)  Comments:  - Restart Breo inhaler 1 puff daily. Rinse mouth after use.  - Start Claritin.  - Start Tessalon Perles as needed.  Orders:  -     loratadine (Claritin) 10 MG tablet; Take 1 tablet by mouth Daily.  Dispense: 30 tablet; Refill: 3  -     Discontinue: Fluticasone Furoate-Vilanterol (Breo Ellipta) 200-25 MCG/ACT inhaler; Inhale 1 puff Daily.  Dispense: 28 each; Refill: 3  -     benzonatate (TESSALON) 200 MG capsule; Take 1 capsule by mouth 3 (Three) Times a Day As Needed for Cough for up to 10 days.  Dispense: 30 capsule; Refill: 0    2. Esophageal dysphagia  Comments:  - Restart omeprazole to daily dosing. Will go back to BID if not well tolerated.  - Start Carafate up to 4 times daily.    Orders:  -     omeprazole (priLOSEC) 40 MG capsule; 1 capsule by mouth twice daily  Dispense: 180 capsule; Refill: 3  -      sucralfate (Carafate) 1 GM/10ML suspension; Take 10 mL by mouth 4 (Four) Times a Day As Needed (gastritis).  Dispense: 414 mL; Refill: 0             Return in about 4 weeks (around 2/23/2023) for Follow up.     Transcribed from ambient dictation for JONE Flood by Reshma Thao.  01/26/23   11:17 EST    Patient or patient representative verbalized consent to the visit recording.  I have personally performed the services described in this document as transcribed by the above individual, and it is both accurate and complete.

## 2023-01-27 ENCOUNTER — TELEPHONE (OUTPATIENT)
Dept: INTERNAL MEDICINE | Facility: CLINIC | Age: 63
End: 2023-01-27
Payer: COMMERCIAL

## 2023-01-27 RX ORDER — FLUTICASONE PROPIONATE AND SALMETEROL 250; 50 UG/1; UG/1
1 POWDER RESPIRATORY (INHALATION)
Qty: 60 EACH | Refills: 3 | Status: SHIPPED | OUTPATIENT
Start: 2023-01-27

## 2023-04-21 PROCEDURE — U0004 COV-19 TEST NON-CDC HGH THRU: HCPCS | Performed by: NURSE PRACTITIONER

## 2023-05-02 ENCOUNTER — TELEPHONE (OUTPATIENT)
Dept: URGENT CARE | Facility: CLINIC | Age: 63
End: 2023-05-02
Payer: COMMERCIAL

## 2023-05-02 NOTE — TELEPHONE ENCOUNTER
Pharmacy called stating that the Qvar was going to be close to $300 and that Flovent inhaler would be much cheaper.  Patient given Flovent and will cancel Qvar.  Patient is aware of risks with this medication.

## 2023-05-23 ENCOUNTER — OFFICE VISIT (OUTPATIENT)
Dept: INTERNAL MEDICINE | Facility: CLINIC | Age: 63
End: 2023-05-23
Payer: COMMERCIAL

## 2023-05-23 ENCOUNTER — APPOINTMENT (OUTPATIENT)
Dept: GENERAL RADIOLOGY | Facility: HOSPITAL | Age: 63
End: 2023-05-23
Payer: COMMERCIAL

## 2023-05-23 ENCOUNTER — HOSPITAL ENCOUNTER (OUTPATIENT)
Facility: HOSPITAL | Age: 63
Setting detail: OBSERVATION
Discharge: HOME | End: 2023-05-26
Attending: EMERGENCY MEDICINE | Admitting: FAMILY MEDICINE
Payer: COMMERCIAL

## 2023-05-23 VITALS
WEIGHT: 222.6 LBS | OXYGEN SATURATION: 98 % | DIASTOLIC BLOOD PRESSURE: 100 MMHG | SYSTOLIC BLOOD PRESSURE: 152 MMHG | HEART RATE: 86 BPM | BODY MASS INDEX: 39.43 KG/M2

## 2023-05-23 DIAGNOSIS — J96.21 ACUTE ON CHRONIC RESPIRATORY FAILURE WITH HYPOXIA AND HYPERCAPNIA: Primary | ICD-10-CM

## 2023-05-23 DIAGNOSIS — U07.1 COVID: ICD-10-CM

## 2023-05-23 DIAGNOSIS — J96.22 ACUTE ON CHRONIC RESPIRATORY FAILURE WITH HYPOXIA AND HYPERCAPNIA: Primary | ICD-10-CM

## 2023-05-23 DIAGNOSIS — E66.01 CLASS 2 SEVERE OBESITY WITH BODY MASS INDEX (BMI) OF 35 TO 39.9 WITH SERIOUS COMORBIDITY: ICD-10-CM

## 2023-05-23 DIAGNOSIS — J45.41 MODERATE PERSISTENT REACTIVE AIRWAY DISEASE WITH ACUTE EXACERBATION: Primary | ICD-10-CM

## 2023-05-23 LAB
ALBUMIN SERPL-MCNC: 4.1 G/DL (ref 3.5–5.2)
ALBUMIN/GLOB SERPL: 1.2 G/DL
ALP SERPL-CCNC: 132 U/L (ref 39–117)
ALT SERPL W P-5'-P-CCNC: 18 U/L (ref 1–33)
ANION GAP SERPL CALCULATED.3IONS-SCNC: 9 MMOL/L (ref 5–15)
ARTERIAL PATENCY WRIST A: ABNORMAL
AST SERPL-CCNC: 24 U/L (ref 1–32)
ATMOSPHERIC PRESS: ABNORMAL MM[HG]
B PARAPERT DNA SPEC QL NAA+PROBE: NOT DETECTED
B PERT DNA SPEC QL NAA+PROBE: NOT DETECTED
BASE EXCESS BLDA CALC-SCNC: 3.9 MMOL/L (ref 0–2)
BASOPHILS # BLD AUTO: 0.02 10*3/MM3 (ref 0–0.2)
BASOPHILS NFR BLD AUTO: 0.5 % (ref 0–1.5)
BDY SITE: ABNORMAL
BILIRUB SERPL-MCNC: 0.2 MG/DL (ref 0–1.2)
BODY TEMPERATURE: 37 C
BUN SERPL-MCNC: 11 MG/DL (ref 8–23)
BUN/CREAT SERPL: 15.5 (ref 7–25)
C PNEUM DNA NPH QL NAA+NON-PROBE: NOT DETECTED
CALCIUM SPEC-SCNC: 9 MG/DL (ref 8.6–10.5)
CHLORIDE SERPL-SCNC: 102 MMOL/L (ref 98–107)
CO2 BLDA-SCNC: 31.4 MMOL/L (ref 22–33)
CO2 SERPL-SCNC: 29 MMOL/L (ref 22–29)
COHGB MFR BLD: 1 % (ref 0–2)
CREAT SERPL-MCNC: 0.71 MG/DL (ref 0.57–1)
DEPRECATED RDW RBC AUTO: 46.5 FL (ref 37–54)
EGFRCR SERPLBLD CKD-EPI 2021: 95.1 ML/MIN/1.73
EOSINOPHIL # BLD AUTO: 0.41 10*3/MM3 (ref 0–0.4)
EOSINOPHIL NFR BLD AUTO: 9.9 % (ref 0.3–6.2)
EPAP: 0
ERYTHROCYTE [DISTWIDTH] IN BLOOD BY AUTOMATED COUNT: 13.8 % (ref 12.3–15.4)
FLUAV SUBTYP SPEC NAA+PROBE: NOT DETECTED
FLUBV RNA ISLT QL NAA+PROBE: NOT DETECTED
GLOBULIN UR ELPH-MCNC: 3.3 GM/DL
GLUCOSE SERPL-MCNC: 98 MG/DL (ref 65–99)
HADV DNA SPEC NAA+PROBE: NOT DETECTED
HCO3 BLDA-SCNC: 29.9 MMOL/L (ref 20–26)
HCOV 229E RNA SPEC QL NAA+PROBE: NOT DETECTED
HCOV HKU1 RNA SPEC QL NAA+PROBE: NOT DETECTED
HCOV NL63 RNA SPEC QL NAA+PROBE: NOT DETECTED
HCOV OC43 RNA SPEC QL NAA+PROBE: NOT DETECTED
HCT VFR BLD AUTO: 41.1 % (ref 34–46.6)
HCT VFR BLD CALC: 38.4 % (ref 38–51)
HGB BLD-MCNC: 12.4 G/DL (ref 12–15.9)
HGB BLDA-MCNC: 12.5 G/DL (ref 14–18)
HMPV RNA NPH QL NAA+NON-PROBE: NOT DETECTED
HOLD SPECIMEN: NORMAL
HPIV1 RNA ISLT QL NAA+PROBE: NOT DETECTED
HPIV2 RNA SPEC QL NAA+PROBE: NOT DETECTED
HPIV3 RNA NPH QL NAA+PROBE: NOT DETECTED
HPIV4 P GENE NPH QL NAA+PROBE: NOT DETECTED
IMM GRANULOCYTES # BLD AUTO: 0.01 10*3/MM3 (ref 0–0.05)
IMM GRANULOCYTES NFR BLD AUTO: 0.2 % (ref 0–0.5)
INHALED O2 CONCENTRATION: 21 %
IPAP: 0
LYMPHOCYTES # BLD AUTO: 1.49 10*3/MM3 (ref 0.7–3.1)
LYMPHOCYTES NFR BLD AUTO: 35.9 % (ref 19.6–45.3)
M PNEUMO IGG SER IA-ACNC: NOT DETECTED
MCH RBC QN AUTO: 27.5 PG (ref 26.6–33)
MCHC RBC AUTO-ENTMCNC: 30.2 G/DL (ref 31.5–35.7)
MCV RBC AUTO: 91.1 FL (ref 79–97)
METHGB BLD QL: 0.1 % (ref 0–1.5)
MODALITY: ABNORMAL
MONOCYTES # BLD AUTO: 0.49 10*3/MM3 (ref 0.1–0.9)
MONOCYTES NFR BLD AUTO: 11.8 % (ref 5–12)
NEUTROPHILS NFR BLD AUTO: 1.73 10*3/MM3 (ref 1.7–7)
NEUTROPHILS NFR BLD AUTO: 41.7 % (ref 42.7–76)
NOTE: ABNORMAL
NRBC BLD AUTO-RTO: 0 /100 WBC (ref 0–0.2)
NT-PROBNP SERPL-MCNC: 73.3 PG/ML (ref 0–900)
OXYHGB MFR BLDV: 94.7 % (ref 94–99)
PAW @ PEAK INSP FLOW SETTING VENT: 0 CMH2O
PCO2 BLDA: 49.9 MM HG (ref 35–45)
PCO2 TEMP ADJ BLD: 49.9 MM HG (ref 35–45)
PH BLDA: 7.39 PH UNITS (ref 7.35–7.45)
PH, TEMP CORRECTED: 7.39 PH UNITS
PLATELET # BLD AUTO: 317 10*3/MM3 (ref 140–450)
PMV BLD AUTO: 9.5 FL (ref 6–12)
PO2 BLDA: 77.7 MM HG (ref 83–108)
PO2 TEMP ADJ BLD: 77.7 MM HG (ref 83–108)
POTASSIUM SERPL-SCNC: 4.2 MMOL/L (ref 3.5–5.2)
PROCALCITONIN SERPL-MCNC: 0.03 NG/ML (ref 0–0.25)
PROT SERPL-MCNC: 7.4 G/DL (ref 6–8.5)
QT INTERVAL: 370 MS
QTC INTERVAL: 432 MS
RBC # BLD AUTO: 4.51 10*6/MM3 (ref 3.77–5.28)
RHINOVIRUS RNA SPEC NAA+PROBE: NOT DETECTED
RSV RNA NPH QL NAA+NON-PROBE: NOT DETECTED
SARS-COV-2 RNA NPH QL NAA+NON-PROBE: DETECTED
SODIUM SERPL-SCNC: 140 MMOL/L (ref 136–145)
TOTAL RATE: 0 BREATHS/MINUTE
TROPONIN T SERPL HS-MCNC: 12 NG/L
WBC NRBC COR # BLD: 4.15 10*3/MM3 (ref 3.4–10.8)
WHOLE BLOOD HOLD COAG: NORMAL
WHOLE BLOOD HOLD SPECIMEN: NORMAL

## 2023-05-23 PROCEDURE — 25010000002 ENOXAPARIN PER 10 MG: Performed by: INTERNAL MEDICINE

## 2023-05-23 PROCEDURE — 0202U NFCT DS 22 TRGT SARS-COV-2: CPT | Performed by: EMERGENCY MEDICINE

## 2023-05-23 PROCEDURE — 25010000002 DEXAMETHASONE SODIUM PHOSPHATE 100 MG/10ML SOLUTION: Performed by: EMERGENCY MEDICINE

## 2023-05-23 PROCEDURE — 85025 COMPLETE CBC W/AUTO DIFF WBC: CPT | Performed by: EMERGENCY MEDICINE

## 2023-05-23 PROCEDURE — 82805 BLOOD GASES W/O2 SATURATION: CPT

## 2023-05-23 PROCEDURE — 80053 COMPREHEN METABOLIC PANEL: CPT | Performed by: EMERGENCY MEDICINE

## 2023-05-23 PROCEDURE — 93005 ELECTROCARDIOGRAM TRACING: CPT

## 2023-05-23 PROCEDURE — 82375 ASSAY CARBOXYHB QUANT: CPT

## 2023-05-23 PROCEDURE — G0378 HOSPITAL OBSERVATION PER HR: HCPCS

## 2023-05-23 PROCEDURE — 84145 PROCALCITONIN (PCT): CPT | Performed by: INTERNAL MEDICINE

## 2023-05-23 PROCEDURE — 96374 THER/PROPH/DIAG INJ IV PUSH: CPT

## 2023-05-23 PROCEDURE — 36600 WITHDRAWAL OF ARTERIAL BLOOD: CPT

## 2023-05-23 PROCEDURE — 83880 ASSAY OF NATRIURETIC PEPTIDE: CPT | Performed by: EMERGENCY MEDICINE

## 2023-05-23 PROCEDURE — 83050 HGB METHEMOGLOBIN QUAN: CPT

## 2023-05-23 PROCEDURE — 96372 THER/PROPH/DIAG INJ SC/IM: CPT

## 2023-05-23 PROCEDURE — 99284 EMERGENCY DEPT VISIT MOD MDM: CPT

## 2023-05-23 PROCEDURE — 93005 ELECTROCARDIOGRAM TRACING: CPT | Performed by: EMERGENCY MEDICINE

## 2023-05-23 PROCEDURE — 94640 AIRWAY INHALATION TREATMENT: CPT

## 2023-05-23 PROCEDURE — 84484 ASSAY OF TROPONIN QUANT: CPT | Performed by: EMERGENCY MEDICINE

## 2023-05-23 PROCEDURE — 71045 X-RAY EXAM CHEST 1 VIEW: CPT

## 2023-05-23 RX ORDER — HYDROCHLOROTHIAZIDE 12.5 MG/1
12.5 TABLET ORAL
Status: DISCONTINUED | OUTPATIENT
Start: 2023-05-23 | End: 2023-05-26 | Stop reason: HOSPADM

## 2023-05-23 RX ORDER — IPRATROPIUM BROMIDE AND ALBUTEROL SULFATE 2.5; .5 MG/3ML; MG/3ML
3 SOLUTION RESPIRATORY (INHALATION) ONCE
Status: DISCONTINUED | OUTPATIENT
Start: 2023-05-23 | End: 2023-05-23

## 2023-05-23 RX ORDER — ENOXAPARIN SODIUM 100 MG/ML
40 INJECTION SUBCUTANEOUS EVERY 12 HOURS SCHEDULED
Status: DISCONTINUED | OUTPATIENT
Start: 2023-05-23 | End: 2023-05-26 | Stop reason: HOSPADM

## 2023-05-23 RX ORDER — ONDANSETRON 2 MG/ML
4 INJECTION INTRAMUSCULAR; INTRAVENOUS EVERY 6 HOURS PRN
Status: DISCONTINUED | OUTPATIENT
Start: 2023-05-23 | End: 2023-05-26 | Stop reason: HOSPADM

## 2023-05-23 RX ORDER — PANTOPRAZOLE SODIUM 40 MG/1
40 TABLET, DELAYED RELEASE ORAL
Status: DISCONTINUED | OUTPATIENT
Start: 2023-05-24 | End: 2023-05-26 | Stop reason: HOSPADM

## 2023-05-23 RX ORDER — AMOXICILLIN 250 MG
2 CAPSULE ORAL 2 TIMES DAILY
Status: DISCONTINUED | OUTPATIENT
Start: 2023-05-23 | End: 2023-05-26 | Stop reason: HOSPADM

## 2023-05-23 RX ORDER — AMLODIPINE BESYLATE 10 MG/1
10 TABLET ORAL DAILY
Status: DISCONTINUED | OUTPATIENT
Start: 2023-05-23 | End: 2023-05-26 | Stop reason: HOSPADM

## 2023-05-23 RX ORDER — METHYLPREDNISOLONE ACETATE 40 MG/ML
40 INJECTION, SUSPENSION INTRA-ARTICULAR; INTRALESIONAL; INTRAMUSCULAR; SOFT TISSUE ONCE
Status: DISCONTINUED | OUTPATIENT
Start: 2023-05-23 | End: 2023-05-23

## 2023-05-23 RX ORDER — GUAIFENESIN AND CODEINE PHOSPHATE 100; 10 MG/5ML; MG/5ML
5 SOLUTION ORAL EVERY 4 HOURS PRN
Status: DISCONTINUED | OUTPATIENT
Start: 2023-05-23 | End: 2023-05-26 | Stop reason: HOSPADM

## 2023-05-23 RX ORDER — LOSARTAN POTASSIUM 50 MG/1
50 TABLET ORAL
Status: DISCONTINUED | OUTPATIENT
Start: 2023-05-23 | End: 2023-05-26 | Stop reason: HOSPADM

## 2023-05-23 RX ORDER — ACETAMINOPHEN 325 MG/1
650 TABLET ORAL EVERY 4 HOURS PRN
Status: DISCONTINUED | OUTPATIENT
Start: 2023-05-23 | End: 2023-05-26 | Stop reason: HOSPADM

## 2023-05-23 RX ORDER — ROSUVASTATIN CALCIUM 10 MG/1
5 TABLET, COATED ORAL DAILY
Status: DISCONTINUED | OUTPATIENT
Start: 2023-05-23 | End: 2023-05-23

## 2023-05-23 RX ORDER — SODIUM CHLORIDE 9 MG/ML
40 INJECTION, SOLUTION INTRAVENOUS AS NEEDED
Status: DISCONTINUED | OUTPATIENT
Start: 2023-05-23 | End: 2023-05-26 | Stop reason: HOSPADM

## 2023-05-23 RX ORDER — SODIUM CHLORIDE 0.9 % (FLUSH) 0.9 %
10 SYRINGE (ML) INJECTION AS NEEDED
Status: DISCONTINUED | OUTPATIENT
Start: 2023-05-23 | End: 2023-05-26 | Stop reason: HOSPADM

## 2023-05-23 RX ORDER — SODIUM CHLORIDE 0.9 % (FLUSH) 0.9 %
10 SYRINGE (ML) INJECTION EVERY 12 HOURS SCHEDULED
Status: DISCONTINUED | OUTPATIENT
Start: 2023-05-23 | End: 2023-05-26 | Stop reason: HOSPADM

## 2023-05-23 RX ORDER — POLYETHYLENE GLYCOL 3350 17 G/17G
17 POWDER, FOR SOLUTION ORAL DAILY PRN
Status: DISCONTINUED | OUTPATIENT
Start: 2023-05-23 | End: 2023-05-26 | Stop reason: HOSPADM

## 2023-05-23 RX ORDER — SEMAGLUTIDE 0.25 MG/.5ML
0.25 INJECTION, SOLUTION SUBCUTANEOUS WEEKLY
Qty: 2 ML | Refills: 2 | Status: SHIPPED | OUTPATIENT
Start: 2023-05-23

## 2023-05-23 RX ORDER — OXYBUTYNIN CHLORIDE 5 MG/1
5 TABLET, EXTENDED RELEASE ORAL DAILY
Status: DISCONTINUED | OUTPATIENT
Start: 2023-05-23 | End: 2023-05-23

## 2023-05-23 RX ORDER — BISACODYL 10 MG
10 SUPPOSITORY, RECTAL RECTAL DAILY PRN
Status: DISCONTINUED | OUTPATIENT
Start: 2023-05-23 | End: 2023-05-26 | Stop reason: HOSPADM

## 2023-05-23 RX ORDER — BUDESONIDE 0.5 MG/2ML
0.5 INHALANT ORAL
Status: DISCONTINUED | OUTPATIENT
Start: 2023-05-23 | End: 2023-05-26 | Stop reason: HOSPADM

## 2023-05-23 RX ORDER — ALBUTEROL SULFATE 90 UG/1
2 AEROSOL, METERED RESPIRATORY (INHALATION) ONCE
Status: COMPLETED | OUTPATIENT
Start: 2023-05-23 | End: 2023-05-23

## 2023-05-23 RX ORDER — ONDANSETRON 4 MG/1
4 TABLET, FILM COATED ORAL EVERY 6 HOURS PRN
Status: DISCONTINUED | OUTPATIENT
Start: 2023-05-23 | End: 2023-05-26 | Stop reason: HOSPADM

## 2023-05-23 RX ORDER — ALBUTEROL SULFATE 90 UG/1
2 AEROSOL, METERED RESPIRATORY (INHALATION) EVERY 4 HOURS PRN
Status: DISCONTINUED | OUTPATIENT
Start: 2023-05-23 | End: 2023-05-26 | Stop reason: HOSPADM

## 2023-05-23 RX ORDER — BISACODYL 5 MG/1
5 TABLET, DELAYED RELEASE ORAL DAILY PRN
Status: DISCONTINUED | OUTPATIENT
Start: 2023-05-23 | End: 2023-05-26 | Stop reason: HOSPADM

## 2023-05-23 RX ORDER — PREDNISONE 10 MG/1
TABLET ORAL
Qty: 30 TABLET | Refills: 0 | Status: SHIPPED | OUTPATIENT
Start: 2023-05-23 | End: 2023-05-23

## 2023-05-23 RX ORDER — POLYETHYLENE GLYCOL 3350 17 G/17G
17 POWDER, FOR SOLUTION ORAL DAILY PRN
Status: DISCONTINUED | OUTPATIENT
Start: 2023-05-23 | End: 2023-05-23

## 2023-05-23 RX ORDER — AMITRIPTYLINE HYDROCHLORIDE 50 MG/1
50 TABLET, FILM COATED ORAL NIGHTLY
Status: DISCONTINUED | OUTPATIENT
Start: 2023-05-23 | End: 2023-05-26 | Stop reason: HOSPADM

## 2023-05-23 RX ORDER — CETIRIZINE HYDROCHLORIDE 10 MG/1
10 TABLET ORAL DAILY
Status: DISCONTINUED | OUTPATIENT
Start: 2023-05-24 | End: 2023-05-26 | Stop reason: HOSPADM

## 2023-05-23 RX ADMIN — DEXAMETHASONE SODIUM PHOSPHATE 10 MG: 10 INJECTION, SOLUTION INTRAMUSCULAR; INTRAVENOUS at 17:46

## 2023-05-23 RX ADMIN — AMITRIPTYLINE HYDROCHLORIDE 50 MG: 50 TABLET, FILM COATED ORAL at 22:15

## 2023-05-23 RX ADMIN — ENOXAPARIN SODIUM 40 MG: 100 INJECTION SUBCUTANEOUS at 22:15

## 2023-05-23 RX ADMIN — AMLODIPINE BESYLATE 10 MG: 10 TABLET ORAL at 22:15

## 2023-05-23 RX ADMIN — SODIUM CHLORIDE 1000 ML: 9 INJECTION, SOLUTION INTRAVENOUS at 17:49

## 2023-05-23 RX ADMIN — NIRMATRELVIR AND RITONAVIR 3 EACH: KIT at 22:17

## 2023-05-23 RX ADMIN — DOCUSATE SODIUM 50 MG AND SENNOSIDES 8.6 MG 2 TABLET: 8.6; 5 TABLET, FILM COATED ORAL at 22:15

## 2023-05-23 RX ADMIN — LOSARTAN POTASSIUM 50 MG: 50 TABLET, FILM COATED ORAL at 22:14

## 2023-05-23 RX ADMIN — GUAIFENESIN AND CODEINE PHOSPHATE 5 ML: 10; 100 LIQUID ORAL at 22:15

## 2023-05-23 RX ADMIN — POLYETHYLENE GLYCOL 3350 17 G: 17 POWDER, FOR SOLUTION ORAL at 22:15

## 2023-05-23 RX ADMIN — Medication 10 ML: at 22:23

## 2023-05-23 RX ADMIN — HYDROCHLOROTHIAZIDE 12.5 MG: 12.5 CAPSULE ORAL at 22:14

## 2023-05-23 RX ADMIN — ALBUTEROL SULFATE 2 PUFF: 90 AEROSOL, METERED RESPIRATORY (INHALATION) at 17:14

## 2023-05-23 NOTE — PROGRESS NOTES
"Chief Complaint   Patient presents with   • Discuss weight loss medication   • Cough   • Wheezing     Acute       Subjective     Diane Manzaanres is a 62 y.o. female.        History of Present Illness     The patient presents to the clinic today for a cough.    She is having a flare up of her cough again. It started about 3 weeks ago. She has been seend at Urgent Care twice since it started. She thinks it is her sinuses draining in the back and then it gets down in her lungs. The cough has become worse since it started. She has an inhaler. She had an appointment with pulmonary last week, but her  ended up in the emergency room, so she missed it. She has not rescheduled it. She is using DuoNeb in her nebulizer. She was put on Qvar Redihaler instead of Advair. She has not taken any steroids since her cough started but was given a steroid injection when she was first seen for her cough. She uses the nebulizer 3 times a day. She can monitor her oxygen level at home, and it was 89 to 90 percent. She cannot sleep because she coughs all night. Chest is tight, difficult to catch her breath.    She inquires about weight loss medication. She knows she cannot take a pill that could increase her heart rate. She weighs almost 250 pounds. She has not been walking. She used \"to be able to walk it off, but now it is not working out that way\".      No current facility-administered medications for this visit.  No current outpatient medications on file.    Facility-Administered Medications Ordered in Other Visits:   •  acetaminophen (TYLENOL) tablet 650 mg, 650 mg, Oral, Q4H PRN, Leslie Canales DO  •  albuterol sulfate HFA (PROVENTIL HFA;VENTOLIN HFA;PROAIR HFA) inhaler 2 puff, 2 puff, Inhalation, Q4H PRN, Leslie Canales DO  •  amitriptyline (ELAVIL) tablet 50 mg, 50 mg, Oral, Nightly, Leslie Canales DO, 50 mg at 05/23/23 2215  •  amLODIPine (NORVASC) tablet 10 mg, 10 mg, Oral, Daily, " Leslie Canales DO, 10 mg at 05/24/23 0905  •  sennosides-docusate (PERICOLACE) 8.6-50 MG per tablet 2 tablet, 2 tablet, Oral, BID, 2 tablet at 05/24/23 0904 **AND** polyethylene glycol (MIRALAX) packet 17 g, 17 g, Oral, Daily PRN, 17 g at 05/23/23 2215 **AND** bisacodyl (DULCOLAX) EC tablet 5 mg, 5 mg, Oral, Daily PRN **AND** bisacodyl (DULCOLAX) suppository 10 mg, 10 mg, Rectal, Daily PRN, Leslie Canales DO  •  budesonide (PULMICORT) nebulizer solution 0.5 mg, 0.5 mg, Nebulization, BID - RT, Leslie Canales DO, 0.5 mg at 05/24/23 0923  •  cetirizine (zyrTEC) tablet 10 mg, 10 mg, Oral, Daily, Leslie Canales DO, 10 mg at 05/24/23 0905  •  dexamethasone (DECADRON) injection 6 mg, 6 mg, Intravenous, Daily, Christen Man, DO  •  Enoxaparin Sodium (LOVENOX) syringe 40 mg, 40 mg, Subcutaneous, Q12H, Leslie Canales DO, 40 mg at 05/24/23 0905  •  guaiFENesin-codeine (GUAIFENESIN AC) 100-10 MG/5ML liquid 5 mL, 5 mL, Oral, Q4H PRN, Leslie Canales DO, 5 mL at 05/23/23 2215  •  losartan (COZAAR) tablet 50 mg, 50 mg, Oral, Q24H, 50 mg at 05/24/23 0904 **AND** hydroCHLOROthiazide (HYDRODIURIL) oral 12.5 mg, 12.5 mg, Oral, Q24H, Leslie Canales DO, 12.5 mg at 05/24/23 0905  •  ondansetron (ZOFRAN) tablet 4 mg, 4 mg, Oral, Q6H PRN **OR** ondansetron (ZOFRAN) injection 4 mg, 4 mg, Intravenous, Q6H PRN, Leslie Canales DO  •  pantoprazole (PROTONIX) EC tablet 40 mg, 40 mg, Oral, Q AM, Leslie Canales DO, 40 mg at 05/24/23 0501  •  Pharmacy Consult - Remdesivir for Mild to Moderate COVID-19 (Within 5 days of symptom onset)- only if contraindicated to Paxlovid, , Does not apply, Continuous PRN, Christen Man, DO  •  remdesivir 200 mg in sodium chloride 0.9 % 290 mL IVPB (powder vial), 200 mg, Intravenous, Q24H **FOLLOWED BY** [START ON 5/25/2023] remdesivir 100 mg in sodium chloride 0.9 % 250 mL IVPB (powder vial), 100 mg, Intravenous, Q24H,  Romario Patiño [facility]  •  sodium chloride 0.9 % flush 10 mL, 10 mL, Intravenous, PRN, Alvarez Hector MD  •  sodium chloride 0.9 % flush 10 mL, 10 mL, Intravenous, Q12H, Leslie Canales Conklin, DO, 10 mL at 05/24/23 0905  •  sodium chloride 0.9 % flush 10 mL, 10 mL, Intravenous, PRN, Gilbert, Leslie Conklin, DO  •  sodium chloride 0.9 % infusion 40 mL, 40 mL, Intravenous, PRN, Gilbert, Leslie Conklin, DO     PMFSH  The following portions of the patient's history were reviewed and updated as appropriate: allergies, current medications, past family history, past medical history, past social history, past surgical history and problem list.    Review of Systems   Constitutional: Positive for fatigue. Negative for diaphoresis and fever.   HENT: Positive for congestion and postnasal drip. Negative for sinus pressure and sinus pain.    Respiratory: Positive for chest tightness, shortness of breath and wheezing.    Cardiovascular: Negative for chest pain and palpitations.   Gastrointestinal: Negative for diarrhea and nausea.   Genitourinary: Negative for dysuria.   Musculoskeletal: Negative for arthralgias and myalgias.   Neurological: Negative for light-headedness, numbness and headaches.       Objective   /100   Pulse 86   Wt 101 kg (222 lb 9.6 oz)   SpO2 98%   BMI 39.43 kg/m²     Physical Exam  Vitals and nursing note reviewed.   Constitutional:       Appearance: She is well-developed.   HENT:      Head: Normocephalic.      Right Ear: Hearing, tympanic membrane, ear canal and external ear normal.      Left Ear: Hearing, tympanic membrane, ear canal and external ear normal.      Nose: Nose normal.   Eyes:      Conjunctiva/sclera: Conjunctivae normal.      Pupils: Pupils are equal, round, and reactive to light.   Cardiovascular:      Rate and Rhythm: Normal rate and regular rhythm.      Heart sounds: Normal heart sounds.   Pulmonary:      Effort: Accessory muscle usage present.      Breath sounds:  Decreased air movement present. Decreased breath sounds and wheezing (bilateral throughout) present. No rhonchi or rales.      Comments: Pt unable to talk without coughing  Musculoskeletal:         General: Normal range of motion.      Cervical back: Normal range of motion.   Skin:     General: Skin is warm and dry.   Neurological:      Mental Status: She is alert.   Psychiatric:         Behavior: Behavior normal.              ASSESSMENT/PLAN    Diagnoses and all orders for this visit:    1. Moderate persistent reactive airway disease with acute exacerbation (Primary)  Comments:  Discussed treatment with steroid shot in office and prednisone taper but dyspnea increased as she was talking throughout visit. Concern for pneumonia vs severe bronchospasm. Advised pt to go to ER by EMS due to increasing respiratory distress, pt declined and said she will drive herself to ER.       2. Class 2 severe obesity with body mass index (BMI) of 35 to 39.9 with serious comorbidity  Comments:  Will see if Wegovy is covered by insurance. Wait to start until after acute respiratory concerns have improved.   Orders:  -     Semaglutide-Weight Management (Wegovy) 0.25 MG/0.5ML solution auto-injector; Inject 0.25 mg under the skin into the appropriate area as directed 1 (One) Time Per Week. If tolerated, after 4 weeks notify office to increase to 0.5 mg dose.  Dispense: 2 mL; Refill: 2             Return in about 1 week (around 5/30/2023) for Follow up.           Transcribed from ambient dictation for JONE Flood by Lacy Yao.  05/23/23   17:16 EDT    Patient or patient representative verbalized consent to the visit recording.  I have personally performed the services described in this document as transcribed by the above individual, and it is both accurate and complete.

## 2023-05-23 NOTE — H&P
[facility] Medicine Services  HISTORY AND PHYSICAL    Patient Name: Diane Manzanares  : 1960  MRN: [id number]  Primary Care Physician: Cindy Rosa PA  Date of admission: 2023      Subjective   Subjective     Chief Complaint:  covid    HPI:  Diane Manzanares is a 62 y.o. female with history of HTN, obesity who presented with worsening congestion, cough - symptoms started about 3 days ago. Went to PCP earlier today for routine visit and sent here.  Tested positive for covid, has had prior covid admission that required her to be on oxygen but no high flow.   Currently on room air, reviewed list of meds with pharmacy prior to starting paxlovid - crestor and oxybutynin on hold    Review of Systems   Constitutional: Positive for fatigue. Negative for fever.   HENT: Positive for congestion and postnasal drip.    Respiratory: Positive for cough and shortness of breath.    Cardiovascular: Negative for chest pain and leg swelling.   Gastrointestinal: Negative for constipation, diarrhea, nausea and vomiting.   Genitourinary: Negative for difficulty urinating and dysuria.   Musculoskeletal: Negative for arthralgias.   Skin: Negative for rash and wound.          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   • Insomnia    • Menopausal symptoms 2016   • Morbid obesity due to excess calories 2016   • Overactive bladder 2016   • Pain of both hip joints 2016   • Rectal polyp 4/3/2020   • Sarcoidosis of lung 2016   • Sarcoidosis of skin 2016             Past Surgical History:   Procedure Laterality Date   • HYSTERECTOMY     • SHOULDER SURGERY      Right shoulder   • TUBAL ABDOMINAL LIGATION         Family History: family history includes Arthritis  in her father; Diabetes in her father; Heart failure in her father; Hyperlipidemia in her mother; Hypertension in her father and mother; Kidney disease in her father; Obesity in an other family member; Osteoarthritis in her mother; Stroke in her father.     Social History:  reports that she quit smoking about 3 years ago. Her smoking use included cigarettes. She has a 15.00 pack-year smoking history. She has never used smokeless tobacco. She reports that she does not drink alcohol and does not use drugs.  Social History     Social History Narrative    Works for [employer].        Medications:  Available home medication information reviewed.  (Not in a hospital admission)      No Known Allergies    Objective   Objective     Vital Signs:   Temp:  [98.5 °F (36.9 °C)] 98.5 °F (36.9 °C)  Heart Rate:  [77-86] 77  Resp:  [20-28] 20  BP: (152-172)/() 155/92       Physical Exam  Constitutional:       General: She is not in acute distress.     Appearance: Normal appearance. She is obese.   HENT:      Head: Normocephalic and atraumatic.   Cardiovascular:      Rate and Rhythm: Normal rate and regular rhythm.   Pulmonary:      Effort: No respiratory distress.      Comments: Decreased inspiratory effort, decreased breath sounds  Abdominal:      General: There is no distension.      Palpations: Abdomen is soft.   Musculoskeletal:         General: No swelling.   Skin:     General: Skin is warm and dry.      Capillary Refill: Capillary refill takes 2 to 3 seconds.   Neurological:      Mental Status: She is alert and oriented to person, place, and time.   Psychiatric:         Mood and Affect: Mood normal.         Behavior: Behavior normal.            Result Review:  I have personally reviewed the results from the time of this admission to 5/23/2023 19:57 EDT and agree with these findings:  [x]  Laboratory list / accordion  []  Microbiology  [x]  Radiology  []  EKG/Telemetry   []  Cardiology/Vascular   []  Pathology  []   Old records  []  Other:   Most notable findings include:         LAB RESULTS:      Lab 05/23/23  1525   WBC 4.15   HEMOGLOBIN 12.4   HEMATOCRIT 41.1   PLATELETS 317   NEUTROS ABS 1.73   IMMATURE GRANS (ABS) 0.01   LYMPHS ABS 1.49   MONOS ABS 0.49   EOS ABS 0.41*   MCV 91.1         Lab 05/23/23  1525   SODIUM 140   POTASSIUM 4.2   CHLORIDE 102   CO2 29.0   ANION GAP 9.0   BUN 11   CREATININE 0.71   EGFR 95.1   GLUCOSE 98   CALCIUM 9.0         Lab 05/23/23  1525   TOTAL PROTEIN 7.4   ALBUMIN 4.1   GLOBULIN 3.3   ALT (SGPT) 18   AST (SGOT) 24   BILIRUBIN 0.2   ALK PHOS 132*         Lab 05/23/23  1525   PROBNP 73.3   HSTROP T 12*                 Lab 05/23/23  1725   PH, ARTERIAL 7.386   PCO2, ARTERIAL 49.9*   PO2 ART 77.7*   FIO2 21   HCO3 ART 29.9*   BASE EXCESS ART 3.9*   CARBOXYHEMOGLOBIN 1.0         Microbiology Results (last 10 days)     Procedure Component Value - Date/Time    Respiratory Panel PCR w/COVID-19(SARS-CoV-2) REUBEN/KRISTIAN/ELAYNE/PAD/COR/MAD/JAQUELIN In-House, NP Swab in UTM/VTM, 3-4 HR TAT - Swab, Nasopharynx [[id number]]  (Abnormal) Collected: 05/23/23 1520    Lab Status: Final result Specimen: Swab from Nasopharynx Updated: 05/23/23 1642     ADENOVIRUS, PCR Not Detected     Coronavirus 229E Not Detected     Coronavirus HKU1 Not Detected     Coronavirus NL63 Not Detected     Coronavirus OC43 Not Detected     COVID19 Detected     Human Metapneumovirus Not Detected     Human Rhinovirus/Enterovirus Not Detected     Influenza A PCR Not Detected     Influenza B PCR Not Detected     Parainfluenza Virus 1 Not Detected     Parainfluenza Virus 2 Not Detected     Parainfluenza Virus 3 Not Detected     Parainfluenza Virus 4 Not Detected     RSV, PCR Not Detected     Bordetella pertussis pcr Not Detected     Bordetella parapertussis PCR Not Detected     Chlamydophila pneumoniae PCR Not Detected     Mycoplasma pneumo by PCR Not Detected    Narrative:      In the setting of a positive respiratory panel with a viral infection  PLUS a negative procalcitonin without other underlying concern for bacterial infection, consider observing off antibiotics or discontinuation of antibiotics and continue supportive care. If the respiratory panel is positive for atypical bacterial infection (Bordetella pertussis, Chlamydophila pneumoniae, or Mycoplasma pneumoniae), consider antibiotic de-escalation to target atypical bacterial infection.          XR Chest 1 View    Result Date: 5/23/2023  XR CHEST 1 VW Date of Exam: 5/23/2023 2:57 PM EDT Indication: SOA triage protocol Comparison: 5/2/2023. FINDINGS: No focal airspace opacity. No pleural effusion or pneumothorax. Normal heart and mediastinal contours.     Impression: No evidence of acute disease in the chest. Electronically Signed: Esau Pierson  5/23/2023 3:39 PM EDT  Workstation ID: [id number]      Results for orders placed in visit on 02/10/21    Adult Transthoracic Echo Complete W/ Cont if Necessary Per Protocol    Interpretation Summary  · Estimated left ventricular EF = 70% Left ventricular ejection fraction appears to be 66 - 70%. Left ventricular systolic function is normal.  · Left ventricular diastolic function is consistent with (grade I) impaired relaxation.  · The right ventricular cavity is mildly dilated.  · Estimated right ventricular systolic pressure from tricuspid regurgitation is mildly elevated (35-45 mmHg). Calculated right ventricular systolic pressure from tricuspid regurgitation is 42 mmHg.      Assessment & Plan   Assessment & Plan     Active Hospital Problems    Diagnosis  POA   • **COVID [U07.1]  Yes      COVID infection, sats documented in low 90s  -start paxlovid, hold crestor and oxybutynin due to drug interactions  -codeine for cough  -isolation precautions   -chest xray ok  -check procalcitonin      HTN  -resumed home meds    Mood disorder  -continue elavil    Obesity     DVT prophylaxis:  lovenox    55 min spent with review of records, exam and placing orders      CODE  STATUS:  full  Code Status and Medical Interventions:   Ordered at: 05/23/23 1933     Level Of Support Discussed With:    Patient     Code Status (Patient has no pulse and is not breathing):    CPR (Attempt to Resuscitate)     Medical Interventions (Patient has pulse or is breathing):    Full Support       Expected Discharge   Expected Discharge Date: 5/24/2023; Expected Discharge Time:      Leslie Canales DO  05/23/23

## 2023-05-23 NOTE — ED PROVIDER NOTES
Subjective   History of Present Illness  Is a 62-year-old female presented to the emergency department with some shortness of breath.  Patient states that she has had bronchitis since 2021 since she was infected with COVID.  The patient been following up with her PCP for this.  For the last few weeks she has been having some worsening difficulties breathing.  She went to see her doctor and was administered steroid shot as well as some DuoNeb therapy.  The patient states that her symptoms have just not resolved.  Wheezing has been worsening over the last 3 days.  She has had a mild cough.  Nonproductive in nature.  She feels like there is mucus in her chest but she cannot get it out.  Does not have any associated fevers or chills.  No chest pain or palpitations.  No abdominal pain or vomiting.    History provided by:  Patient   used: No        Review of Systems   Constitutional: Negative for chills and fever.   HENT: Negative for congestion, ear pain and sore throat.    Eyes: Negative for visual disturbance.   Respiratory: Positive for cough and shortness of breath.    Cardiovascular: Negative for chest pain.   Gastrointestinal: Negative for abdominal pain.   Genitourinary: Negative for difficulty urinating.   Musculoskeletal: Negative for arthralgias.   Skin: Negative for rash.   Neurological: Negative for dizziness, weakness and numbness.   Psychiatric/Behavioral: Negative for agitation.       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   • Insomnia    • Menopausal symptoms 9/2/2016   • Morbid obesity due to excess calories 8/8/2016   • Overactive bladder 9/2/2016   • Pain of both hip joints 8/8/2016   • Rectal polyp 4/3/2020   • Sarcoidosis of lung 9/2/2016   • Sarcoidosis of skin 9/2/2016       No Known  Allergies    Past Surgical History:   Procedure Laterality Date   • HYSTERECTOMY     • SHOULDER SURGERY      Right shoulder   • TUBAL ABDOMINAL LIGATION         Family History   Problem Relation Age of Onset   • Hyperlipidemia Mother    • Osteoarthritis Mother    • Hypertension Mother    • Arthritis Father    • Heart failure Father    • Diabetes Father    • Hypertension Father    • Kidney disease Father    • Stroke Father    • Obesity Other        Social History     Socioeconomic History   • Marital status:    Tobacco Use   • Smoking status: Former     Packs/day: 1.00     Years: 15.00     Pack years: 15.00     Types: Cigarettes     Quit date: 2020     Years since quitting: 3.3   • Smokeless tobacco: Never   • Tobacco comments:     quit 25 yrs ago; smoked 1ppd x 15 yrs    Vaping Use   • Vaping Use: Never used   Substance and Sexual Activity   • Alcohol use: Never   • Drug use: Never   • Sexual activity: Defer           Objective   Physical Exam  Vitals and nursing note reviewed.   Constitutional:       General: She is not in acute distress.     Appearance: She is not ill-appearing or toxic-appearing.   HENT:      Mouth/Throat:      Pharynx: No posterior oropharyngeal erythema.   Eyes:      Conjunctiva/sclera: Conjunctivae normal.      Pupils: Pupils are equal, round, and reactive to light.   Cardiovascular:      Rate and Rhythm: Normal rate and regular rhythm.   Pulmonary:      Effort: Pulmonary effort is normal. No accessory muscle usage or respiratory distress.      Breath sounds: Wheezing present.   Abdominal:      General: Abdomen is flat. There is no distension.      Palpations: There is no mass.      Tenderness: There is no abdominal tenderness. There is no guarding or rebound.   Musculoskeletal:         General: No deformity. Normal range of motion.   Skin:     General: Skin is warm.      Findings: No rash.   Neurological:      General: No focal deficit present.      Mental Status: She is alert and  oriented to person, place, and time.      Motor: No weakness.         ECG 12 Lead Dyspnea      Date/Time: 5/23/2023 2:52 PM  Performed by: Alvarez Hector MD  Authorized by: Alvarez Hector MD   Interpreted by physician  Comparison: compared with previous ECG   Similar to previous ECG  Rhythm: sinus rhythm  Rate: normal  QRS axis: normal  Conduction: conduction normal  ST Segments: ST segments normal  T Waves: T waves normal  Clinical impression: normal ECG  Comments: EKG was directly visualized by myself, interpretations as documented in hospital course.                   ED Course  ED Course as of 05/23/23 1847   Tue May 23, 2023   1525 Respiratory Panel PCR w/COVID-19(SARS-CoV-2) REUBEN/KRISTIAN/ELAYNE/PAD/COR/MAD/JAQUELIN In-House, NP Swab in UTM/VTM, 3-4 HR TAT - Swab, Nasopharynx(!!)  Patient was positive for COVID [JK]   1530 Blood Gas, Arterial With Co-Ox(!) [JK]   1837 Single High Sensitivity Troponin T(!) [JK]   1837 CBC & Differential(!) [JK]   1837 Comprehensive Metabolic Panel(!)  Patient's ABG was concerning for compensated chronic respiratory acidosis.  CBC unremarkable.  Troponin mildly elevated.  BNP normal. [JK]   1846 XR Chest 1 View  Imaging was directly visualized by myself, per my interpretations, no acute process. [JK]   1846 On reevaluation, the patient does get significantly dyspneic and has some drop in oxygen saturation with coughing episodes as well as exertion.  Patient is likely having respiratory failure secondary to COVID infection.  Patient was continued on steroid therapy.  Admitted to hospital for further evaluation and treatment. [JK]   1847 Based on the patient's presentation, history and diffuse work-up in the emergency department, the patient is deemed appropriate for admission to the hospital for further evaluation and treatment.  This was discussed with the patient at bedside.  They are in agreement with the current medical management.    Admitting physician:   Rubin    Discussion was had with admitting physician regarding the laboratory and imaging findings.  We did discuss current therapeutics in the emergency department and progression of the patient.  Working diagnosis was conveyed to the admitting physician, as well as current status and prognosis for the patient.  They are in agreement with these findings and have accepted admission. [JK]      ED Course User Index  [JK] Alvarez Hector MD                                           Medical Decision Making  Is a 62-year-old female presented to the emergency department with some cough and difficulty breathing.  The patient does have some significant wheezing on examination.  Findings are consistent with bronchitis.  Sounds like the patient does have underlying restrictive airway disease.  She is mildly tachypneic, however there is no hypoxia or respiratory distress.  IV access will be established and the patient.  She was placed on continuous telemetry and pulse oximetry monitoring.  Patient provided DuoNeb as well as steroid therapy.  Work-up initiated.    Differential diagnosis: Asthma exacerbation, bronchitis, pneumonia, bronchiolitis, restrictive airway disease    Amount and/or Complexity of Data Reviewed  External Data Reviewed: labs, radiology, ECG and notes.     Details: External laboratories, imaging as well as notes were reviewed personally by myself.    Date of previous record: May 2, 2023    Source of note: [facility] urgent treatment    Summary: Patient was evaluated for bronchospasm.  She was given dexamethasone injection as well as DuoNeb therapy.  Work-up unremarkable.  Labs: ordered. Decision-making details documented in ED Course.  Radiology: ordered and independent interpretation performed. Decision-making details documented in ED Course.  ECG/medicine tests: ordered and independent interpretation performed. Decision-making details documented in ED Course.      Risk  Prescription drug  management.          Final diagnoses:   Acute on chronic respiratory failure with hypoxia and hypercapnia   COVID       ED Disposition  ED Disposition     ED Disposition   Decision to Admit    Condition   --    Comment   Level of Care: Telemetry [5]   Diagnosis: COVID [[id number]]               No follow-up provider specified.       Medication List      No changes were made to your prescriptions during this visit.          Alvarez Hector MD  05/23/23 2020

## 2023-05-24 ENCOUNTER — TELEPHONE (OUTPATIENT)
Dept: INTERNAL MEDICINE | Facility: CLINIC | Age: 63
End: 2023-05-24

## 2023-05-24 LAB
ALBUMIN SERPL-MCNC: 4.3 G/DL (ref 3.5–5.2)
ALBUMIN/GLOB SERPL: 1.3 G/DL
ALP SERPL-CCNC: 151 U/L (ref 39–117)
ALT SERPL W P-5'-P-CCNC: 22 U/L (ref 1–33)
ANION GAP SERPL CALCULATED.3IONS-SCNC: 12 MMOL/L (ref 5–15)
AST SERPL-CCNC: 26 U/L (ref 1–32)
BASOPHILS # BLD AUTO: 0.01 10*3/MM3 (ref 0–0.2)
BASOPHILS NFR BLD AUTO: 0.2 % (ref 0–1.5)
BILIRUB SERPL-MCNC: 0.3 MG/DL (ref 0–1.2)
BUN SERPL-MCNC: 12 MG/DL (ref 8–23)
BUN/CREAT SERPL: 18.8 (ref 7–25)
CALCIUM SPEC-SCNC: 9.5 MG/DL (ref 8.6–10.5)
CHLORIDE SERPL-SCNC: 100 MMOL/L (ref 98–107)
CK SERPL-CCNC: 176 U/L (ref 20–180)
CO2 SERPL-SCNC: 26 MMOL/L (ref 22–29)
CREAT SERPL-MCNC: 0.64 MG/DL (ref 0.57–1)
DEPRECATED RDW RBC AUTO: 44 FL (ref 37–54)
EGFRCR SERPLBLD CKD-EPI 2021: 98.8 ML/MIN/1.73
EOSINOPHIL # BLD AUTO: 0 10*3/MM3 (ref 0–0.4)
EOSINOPHIL NFR BLD AUTO: 0 % (ref 0.3–6.2)
ERYTHROCYTE [DISTWIDTH] IN BLOOD BY AUTOMATED COUNT: 13.2 % (ref 12.3–15.4)
GLOBULIN UR ELPH-MCNC: 3.2 GM/DL
GLUCOSE SERPL-MCNC: 133 MG/DL (ref 65–99)
HCT VFR BLD AUTO: 43.2 % (ref 34–46.6)
HGB BLD-MCNC: 13.5 G/DL (ref 12–15.9)
IMM GRANULOCYTES # BLD AUTO: 0.07 10*3/MM3 (ref 0–0.05)
IMM GRANULOCYTES NFR BLD AUTO: 1.7 % (ref 0–0.5)
LYMPHOCYTES # BLD AUTO: 0.88 10*3/MM3 (ref 0.7–3.1)
LYMPHOCYTES NFR BLD AUTO: 21.9 % (ref 19.6–45.3)
MCH RBC QN AUTO: 28.1 PG (ref 26.6–33)
MCHC RBC AUTO-ENTMCNC: 31.3 G/DL (ref 31.5–35.7)
MCV RBC AUTO: 90 FL (ref 79–97)
MONOCYTES # BLD AUTO: 0.09 10*3/MM3 (ref 0.1–0.9)
MONOCYTES NFR BLD AUTO: 2.2 % (ref 5–12)
NEUTROPHILS NFR BLD AUTO: 2.97 10*3/MM3 (ref 1.7–7)
NEUTROPHILS NFR BLD AUTO: 74 % (ref 42.7–76)
NRBC BLD AUTO-RTO: 0 /100 WBC (ref 0–0.2)
PLATELET # BLD AUTO: 315 10*3/MM3 (ref 140–450)
PMV BLD AUTO: 9.3 FL (ref 6–12)
POTASSIUM SERPL-SCNC: 4.5 MMOL/L (ref 3.5–5.2)
PROT SERPL-MCNC: 7.5 G/DL (ref 6–8.5)
RBC # BLD AUTO: 4.8 10*6/MM3 (ref 3.77–5.28)
SODIUM SERPL-SCNC: 138 MMOL/L (ref 136–145)
WBC NRBC COR # BLD: 4.02 10*3/MM3 (ref 3.4–10.8)

## 2023-05-24 PROCEDURE — 25010000002 ENOXAPARIN PER 10 MG: Performed by: INTERNAL MEDICINE

## 2023-05-24 PROCEDURE — 94664 DEMO&/EVAL PT USE INHALER: CPT

## 2023-05-24 PROCEDURE — 82550 ASSAY OF CK (CPK): CPT | Performed by: INTERNAL MEDICINE

## 2023-05-24 PROCEDURE — 96372 THER/PROPH/DIAG INJ SC/IM: CPT

## 2023-05-24 PROCEDURE — 80053 COMPREHEN METABOLIC PANEL: CPT | Performed by: INTERNAL MEDICINE

## 2023-05-24 PROCEDURE — 94799 UNLISTED PULMONARY SVC/PX: CPT

## 2023-05-24 PROCEDURE — 85025 COMPLETE CBC W/AUTO DIFF WBC: CPT | Performed by: INTERNAL MEDICINE

## 2023-05-24 PROCEDURE — 25010000002 REMDESIVIR 100 MG/20ML SOLUTION 1 EACH VIAL

## 2023-05-24 PROCEDURE — G0378 HOSPITAL OBSERVATION PER HR: HCPCS

## 2023-05-24 PROCEDURE — 25010000002 DEXAMETHASONE PER 1 MG: Performed by: FAMILY MEDICINE

## 2023-05-24 PROCEDURE — 96376 TX/PRO/DX INJ SAME DRUG ADON: CPT

## 2023-05-24 RX ORDER — OXYBUTYNIN CHLORIDE 5 MG/1
5 TABLET, EXTENDED RELEASE ORAL NIGHTLY
Status: DISCONTINUED | OUTPATIENT
Start: 2023-05-24 | End: 2023-05-26 | Stop reason: HOSPADM

## 2023-05-24 RX ORDER — DEXAMETHASONE SODIUM PHOSPHATE 4 MG/ML
6 INJECTION, SOLUTION INTRA-ARTICULAR; INTRALESIONAL; INTRAMUSCULAR; INTRAVENOUS; SOFT TISSUE DAILY
Status: DISCONTINUED | OUTPATIENT
Start: 2023-05-24 | End: 2023-05-26 | Stop reason: HOSPADM

## 2023-05-24 RX ORDER — ROSUVASTATIN CALCIUM 10 MG/1
5 TABLET, COATED ORAL NIGHTLY
Status: DISCONTINUED | OUTPATIENT
Start: 2023-05-24 | End: 2023-05-26 | Stop reason: HOSPADM

## 2023-05-24 RX ADMIN — HYDROCHLOROTHIAZIDE 12.5 MG: 12.5 CAPSULE ORAL at 09:05

## 2023-05-24 RX ADMIN — GUAIFENESIN AND CODEINE PHOSPHATE 5 ML: 10; 100 LIQUID ORAL at 13:55

## 2023-05-24 RX ADMIN — AMITRIPTYLINE HYDROCHLORIDE 50 MG: 50 TABLET, FILM COATED ORAL at 21:22

## 2023-05-24 RX ADMIN — LOSARTAN POTASSIUM 50 MG: 50 TABLET, FILM COATED ORAL at 09:04

## 2023-05-24 RX ADMIN — AMLODIPINE BESYLATE 10 MG: 10 TABLET ORAL at 09:05

## 2023-05-24 RX ADMIN — GUAIFENESIN AND CODEINE PHOSPHATE 5 ML: 10; 100 LIQUID ORAL at 21:22

## 2023-05-24 RX ADMIN — POLYETHYLENE GLYCOL 3350 17 G: 17 POWDER, FOR SOLUTION ORAL at 14:02

## 2023-05-24 RX ADMIN — DEXAMETHASONE SODIUM PHOSPHATE 6 MG: 4 INJECTION INTRA-ARTICULAR; INTRALESIONAL; INTRAMUSCULAR; INTRAVENOUS; SOFT TISSUE at 10:58

## 2023-05-24 RX ADMIN — CETIRIZINE HYDROCHLORIDE 10 MG: 10 TABLET, FILM COATED ORAL at 09:05

## 2023-05-24 RX ADMIN — BUDESONIDE INHALATION 0.5 MG: 0.5 SUSPENSION RESPIRATORY (INHALATION) at 09:23

## 2023-05-24 RX ADMIN — DOCUSATE SODIUM 50 MG AND SENNOSIDES 8.6 MG 2 TABLET: 8.6; 5 TABLET, FILM COATED ORAL at 09:04

## 2023-05-24 RX ADMIN — Medication 10 ML: at 21:23

## 2023-05-24 RX ADMIN — ROSUVASTATIN 5 MG: 10 TABLET, FILM COATED ORAL at 21:22

## 2023-05-24 RX ADMIN — REMDESIVIR 200 MG: 100 INJECTION, POWDER, LYOPHILIZED, FOR SOLUTION INTRAVENOUS at 10:57

## 2023-05-24 RX ADMIN — DOCUSATE SODIUM 50 MG AND SENNOSIDES 8.6 MG 2 TABLET: 8.6; 5 TABLET, FILM COATED ORAL at 21:22

## 2023-05-24 RX ADMIN — PANTOPRAZOLE SODIUM 40 MG: 40 TABLET, DELAYED RELEASE ORAL at 05:01

## 2023-05-24 RX ADMIN — Medication 10 ML: at 09:05

## 2023-05-24 RX ADMIN — OXYBUTYNIN CHLORIDE 5 MG: 5 TABLET, EXTENDED RELEASE ORAL at 21:22

## 2023-05-24 RX ADMIN — BUDESONIDE INHALATION 0.5 MG: 0.5 SUSPENSION RESPIRATORY (INHALATION) at 19:15

## 2023-05-24 RX ADMIN — ENOXAPARIN SODIUM 40 MG: 100 INJECTION SUBCUTANEOUS at 09:05

## 2023-05-24 RX ADMIN — ENOXAPARIN SODIUM 40 MG: 100 INJECTION SUBCUTANEOUS at 21:22

## 2023-05-24 NOTE — PROGRESS NOTES
[facility] Medicine Services  PROGRESS NOTE    Patient Name: Diane Manzanares  : 1960  MRN: [id number]    Date of Admission: 2023  Primary Care Physician: Cindy Rosa PA    Subjective   Subjective     CC:  F/U COVID    HPI:  Patient seen and examined. Feels worse today. Requiring O2. +Cough.     ROS:  Gen- No fevers, chills  CV- No chest pain, palpitations  Resp- +cough, +dyspnea  GI- No N/V/D, abd pain    Objective   Objective     Vital Signs:   Temp:  [97.6 °F (36.4 °C)-98.7 °F (37.1 °C)] 98 °F (36.7 °C)  Heart Rate:  [71-94] 94  Resp:  [20-28] 22  BP: (146-172)/() 146/92  Flow (L/min):  [2] 2     Physical Exam:  Constitutional: No acute distress, awake, alert, appears not to feel well   HENT: NCAT, mucous membranes moist  Respiratory: +coarse B/L + cough   Cardiovascular: RRR, no murmurs, rubs, or gallops  Gastrointestinal: Positive bowel sounds, soft, nontender, nondistended  Musculoskeletal: No bilateral ankle edema  Psychiatric: Appropriate affect, cooperative  Neurologic: Oriented x 3, BEARDEN, speech clear  Skin: No rashes to exposed skin    Results Reviewed:  LAB RESULTS:      Lab 23  0718 23  1525   WBC 4.02 4.15   HEMOGLOBIN 13.5 12.4   HEMATOCRIT 43.2 41.1   PLATELETS 315 317   NEUTROS ABS 2.97 1.73   IMMATURE GRANS (ABS) 0.07* 0.01   LYMPHS ABS 0.88 1.49   MONOS ABS 0.09* 0.49   EOS ABS 0.00 0.41*   MCV 90.0 91.1   PROCALCITONIN  --  0.03         Lab 23  0718 23  1525   SODIUM 138 140   POTASSIUM 4.5 4.2   CHLORIDE 100 102   CO2 26.0 29.0   ANION GAP 12.0 9.0   BUN 12 11   CREATININE 0.64 0.71   EGFR 98.8 95.1   GLUCOSE 133* 98   CALCIUM 9.5 9.0         Lab 23  0718 23  1525   TOTAL PROTEIN 7.5 7.4   ALBUMIN 4.3 4.1   GLOBULIN 3.2 3.3   ALT (SGPT) 22 18   AST (SGOT) 26 24   BILIRUBIN 0.3 0.2   ALK PHOS 151* 132*         Lab 23  1525   PROBNP 73.3   HSTROP T 12*                 Lab 23  1725   PH, ARTERIAL  7.386   PCO2, ARTERIAL 49.9*   PO2 ART 77.7*   FIO2 21   HCO3 ART 29.9*   BASE EXCESS ART 3.9*   CARBOXYHEMOGLOBIN 1.0     Brief Urine Lab Results     None          Microbiology Results Abnormal     None          XR Chest 1 View    Result Date: 5/23/2023  XR CHEST 1 VW Date of Exam: 5/23/2023 2:57 PM EDT Indication: SOA triage protocol Comparison: 5/2/2023. FINDINGS: No focal airspace opacity. No pleural effusion or pneumothorax. Normal heart and mediastinal contours.     Impression: No evidence of acute disease in the chest. Electronically Signed: Esau Murphyord  5/23/2023 3:39 PM EDT  Workstation ID: [id number]      Results for orders placed in visit on 02/10/21    Adult Transthoracic Echo Complete W/ Cont if Necessary Per Protocol    Interpretation Summary  · Estimated left ventricular EF = 70% Left ventricular ejection fraction appears to be 66 - 70%. Left ventricular systolic function is normal.  · Left ventricular diastolic function is consistent with (grade I) impaired relaxation.  · The right ventricular cavity is mildly dilated.  · Estimated right ventricular systolic pressure from tricuspid regurgitation is mildly elevated (35-45 mmHg). Calculated right ventricular systolic pressure from tricuspid regurgitation is 42 mmHg.      Current medications:  Scheduled Meds:amitriptyline, 50 mg, Oral, Nightly  amLODIPine, 10 mg, Oral, Daily  budesonide, 0.5 mg, Nebulization, BID - RT  cetirizine, 10 mg, Oral, Daily  dexamethasone, 6 mg, Intravenous, Daily  enoxaparin, 40 mg, Subcutaneous, Q12H  losartan, 50 mg, Oral, Q24H   And  hydroCHLOROthiazide, 12.5 mg, Oral, Q24H  pantoprazole, 40 mg, Oral, Q AM  remdesivir, 200 mg, Intravenous, Q24H   Followed by  [START ON 5/25/2023] remdesivir, 100 mg, Intravenous, Q24H  senna-docusate sodium, 2 tablet, Oral, BID  sodium chloride, 10 mL, Intravenous, Q12H      Continuous Infusions:Pharmacy Consult - Remdesivir,       PRN Meds:.•  acetaminophen  •  albuterol sulfate HFA  •   senna-docusate sodium **AND** polyethylene glycol **AND** bisacodyl **AND** bisacodyl  •  guaiFENesin-codeine  •  ondansetron **OR** ondansetron  •  Pharmacy Consult - Remdesivir  •  sodium chloride  •  sodium chloride  •  sodium chloride    Assessment & Plan   Assessment & Plan     Active Hospital Problems    Diagnosis  POA   • **COVID [U07.1]  Yes      Resolved Hospital Problems   No resolved problems to display.        Brief Hospital Course to date:  Diane Manzanares is a 62 y.o. female with history of HTN, obesity who presented with worsening congestion, cough - symptoms started about 3 days PTA. Went to PCP  for routine visit and sent to ED.  Tested positive for covid, has had prior covid admission that required her to be on oxygen but no high flow.     This patient's problems and plans were partially entered by my partner and updated as appropriate by me 05/24/23.  All problems are new to me today    COVID  -DC Paxlovid, patient feeling worse today, now on O2.   -Start Remdesivir, Decadron (day 1)   -Wean O2 as tolerated  -Cough meds PRN  -Pulmicort BID    HTN  -Continue home meds    Expected Discharge Location and Transportation: Home  Expected Discharge   Expected Discharge Date: 5/26/2023; Expected Discharge Time:      DVT prophylaxis:  Medical DVT prophylaxis orders are present.          CODE STATUS:   Code Status and Medical Interventions:   Ordered at: 05/23/23 1933     Level Of Support Discussed With:    Patient     Code Status (Patient has no pulse and is not breathing):    CPR (Attempt to Resuscitate)     Medical Interventions (Patient has pulse or is breathing):    Full Support       Christen Man,   05/24/23

## 2023-05-24 NOTE — CASE MANAGEMENT/SOCIAL WORK
Continued Stay Note  [facility]     Patient Name: Diane Manzanares  MRN: [id number]  Today's Date: 5/24/2023    Admit Date: 5/23/2023    Plan: update   Discharge Plan     Row Name 05/24/23 1511       Plan    Plan update    Patient/Family in Agreement with Plan yes    Plan Comments CM has printed Good Rx coupons and provide to patient to offset medication costs for [employer].  CM following.    Final Discharge Disposition Code 01 - home or self-care    Row Name 05/24/23 4617       Plan    Plan Home    Patient/Family in Agreement with Plan yes    Plan Comments Spoke with patient at bedside regarding discharge planning.  Patient denies use of HH and reports she has a Nebulizer.  She has prescription coverage and medications are not always affordable, mostly the respiratory medications.  CM to review medications and search for any additional help.  She lives with her spouse in a multilevel house with ground level entry.  She received the COVID vaccine.  No other discharge needs verbalized.  CM following.  Patient plan is to discharge home via car with family to transport.    Final Discharge Disposition Code 01 - home or self-care               Discharge Codes    No documentation.               Expected Discharge Date and Time     Expected Discharge Date Expected Discharge Time    May 26, 2023             Anahi Paredes, RN

## 2023-05-24 NOTE — PLAN OF CARE
Goal Outcome Evaluation:  Plan of Care Reviewed With: patient           Outcome Evaluation: VSS on 2 L NC, sats > 90%. A/O x 4. Airborne precautions maintained. Patient having nonproductive cough, PRN guafenisen given x1. Independent, ambulating to bathroom. Reports increased dyspnea with exertion. No other complaints.

## 2023-05-24 NOTE — TELEPHONE ENCOUNTER
Caller: Diane Manzanares    Relationship: Self    Best call back number: [phone]    What is the best time to reach you: ANY TIME    Who are you requesting to speak with (clinical staff, provider,  specific staff member): ARTURO LUA    Do you know the name of the person who called: SELF    What was the call regarding: PATIENT HAS BEEN ADMITTED TO HOSPITAL FOR COVID/SARS AND WHEN SHE COUGHS SHE IS URINATING AND WOULD LIKE A REFERRAL FOR [facility]. PLEASE ADVISE    Do you require a callback: YES

## 2023-05-24 NOTE — PLAN OF CARE
Goal Outcome Evaluation:   Pt a/o x4, vs stable, and on 2L NC. Pt resting in bed. Call light within reach.

## 2023-05-24 NOTE — TELEPHONE ENCOUNTER
Called pt and let her know since she is currently admitted that they can place that referral in the hospital, if they are not able to place the referral pt will call back and let us know to place one.

## 2023-05-24 NOTE — CASE MANAGEMENT/SOCIAL WORK
Continued Stay Note  [facility]     Patient Name: Diane Manzanares  MRN: [id number]  Today's Date: 5/24/2023    Admit Date: 5/23/2023    Plan: Home   Discharge Plan     Row Name 05/24/23 1457       Plan    Plan Home    Patient/Family in Agreement with Plan yes    Plan Comments Spoke with patient at bedside regarding discharge planning.  Patient denies use of HH and reports she has a Nebulizer.  She has prescription coverage and medications are not always affordable, mostly the respiratory medications.  CM to review medications and search for any additional help.  She lives with her spouse in a multilevel house with ground level entry.  She received the COVID vaccine.  No other discharge needs verbalized.  CM following.  Patient plan is to discharge home via car with family to transport.    Final Discharge Disposition Code 01 - home or self-care               Discharge Codes    No documentation.               Expected Discharge Date and Time     Expected Discharge Date Expected Discharge Time    May 26, 2023             Anahi Paredes RN

## 2023-05-25 LAB
ALBUMIN SERPL-MCNC: 4.1 G/DL (ref 3.5–5.2)
ALBUMIN/GLOB SERPL: 1.4 G/DL
ALP SERPL-CCNC: 138 U/L (ref 39–117)
ALT SERPL W P-5'-P-CCNC: 28 U/L (ref 1–33)
ANION GAP SERPL CALCULATED.3IONS-SCNC: 10 MMOL/L (ref 5–15)
AST SERPL-CCNC: 24 U/L (ref 1–32)
BASOPHILS # BLD AUTO: 0.01 10*3/MM3 (ref 0–0.2)
BASOPHILS NFR BLD AUTO: 0.1 % (ref 0–1.5)
BILIRUB SERPL-MCNC: <0.2 MG/DL (ref 0–1.2)
BUN SERPL-MCNC: 17 MG/DL (ref 8–23)
BUN/CREAT SERPL: 22.4 (ref 7–25)
CALCIUM SPEC-SCNC: 9.8 MG/DL (ref 8.6–10.5)
CHLORIDE SERPL-SCNC: 99 MMOL/L (ref 98–107)
CO2 SERPL-SCNC: 27 MMOL/L (ref 22–29)
CREAT SERPL-MCNC: 0.76 MG/DL (ref 0.57–1)
CRP SERPL-MCNC: 1.28 MG/DL (ref 0–0.5)
DEPRECATED RDW RBC AUTO: 44.4 FL (ref 37–54)
EGFRCR SERPLBLD CKD-EPI 2021: 87.6 ML/MIN/1.73
EOSINOPHIL # BLD AUTO: 0 10*3/MM3 (ref 0–0.4)
EOSINOPHIL NFR BLD AUTO: 0 % (ref 0.3–6.2)
ERYTHROCYTE [DISTWIDTH] IN BLOOD BY AUTOMATED COUNT: 13.5 % (ref 12.3–15.4)
GLOBULIN UR ELPH-MCNC: 2.9 GM/DL
GLUCOSE SERPL-MCNC: 131 MG/DL (ref 65–99)
HCT VFR BLD AUTO: 42.1 % (ref 34–46.6)
HGB BLD-MCNC: 13.2 G/DL (ref 12–15.9)
IMM GRANULOCYTES # BLD AUTO: 0.07 10*3/MM3 (ref 0–0.05)
IMM GRANULOCYTES NFR BLD AUTO: 0.7 % (ref 0–0.5)
LYMPHOCYTES # BLD AUTO: 1.48 10*3/MM3 (ref 0.7–3.1)
LYMPHOCYTES NFR BLD AUTO: 15.8 % (ref 19.6–45.3)
MCH RBC QN AUTO: 28.3 PG (ref 26.6–33)
MCHC RBC AUTO-ENTMCNC: 31.4 G/DL (ref 31.5–35.7)
MCV RBC AUTO: 90.3 FL (ref 79–97)
MONOCYTES # BLD AUTO: 0.62 10*3/MM3 (ref 0.1–0.9)
MONOCYTES NFR BLD AUTO: 6.6 % (ref 5–12)
NEUTROPHILS NFR BLD AUTO: 7.2 10*3/MM3 (ref 1.7–7)
NEUTROPHILS NFR BLD AUTO: 76.8 % (ref 42.7–76)
NRBC BLD AUTO-RTO: 0 /100 WBC (ref 0–0.2)
PLATELET # BLD AUTO: 311 10*3/MM3 (ref 140–450)
PMV BLD AUTO: 9.5 FL (ref 6–12)
POTASSIUM SERPL-SCNC: 4.6 MMOL/L (ref 3.5–5.2)
PROT SERPL-MCNC: 7 G/DL (ref 6–8.5)
RBC # BLD AUTO: 4.66 10*6/MM3 (ref 3.77–5.28)
SODIUM SERPL-SCNC: 136 MMOL/L (ref 136–145)
WBC NRBC COR # BLD: 9.38 10*3/MM3 (ref 3.4–10.8)

## 2023-05-25 PROCEDURE — G0378 HOSPITAL OBSERVATION PER HR: HCPCS

## 2023-05-25 PROCEDURE — 85025 COMPLETE CBC W/AUTO DIFF WBC: CPT | Performed by: FAMILY MEDICINE

## 2023-05-25 PROCEDURE — 25010000002 ENOXAPARIN PER 10 MG: Performed by: INTERNAL MEDICINE

## 2023-05-25 PROCEDURE — 99232 SBSQ HOSP IP/OBS MODERATE 35: CPT | Performed by: NURSE PRACTITIONER

## 2023-05-25 PROCEDURE — 86140 C-REACTIVE PROTEIN: CPT | Performed by: FAMILY MEDICINE

## 2023-05-25 PROCEDURE — 94799 UNLISTED PULMONARY SVC/PX: CPT

## 2023-05-25 PROCEDURE — 96372 THER/PROPH/DIAG INJ SC/IM: CPT

## 2023-05-25 PROCEDURE — 25010000002 DEXAMETHASONE PER 1 MG: Performed by: FAMILY MEDICINE

## 2023-05-25 PROCEDURE — 25010000002 REMDESIVIR 100 MG/20ML SOLUTION 1 EACH VIAL

## 2023-05-25 PROCEDURE — 96376 TX/PRO/DX INJ SAME DRUG ADON: CPT

## 2023-05-25 PROCEDURE — 80053 COMPREHEN METABOLIC PANEL: CPT | Performed by: INTERNAL MEDICINE

## 2023-05-25 RX ADMIN — DEXAMETHASONE SODIUM PHOSPHATE 6 MG: 4 INJECTION INTRA-ARTICULAR; INTRALESIONAL; INTRAMUSCULAR; INTRAVENOUS; SOFT TISSUE at 08:54

## 2023-05-25 RX ADMIN — Medication 10 ML: at 08:54

## 2023-05-25 RX ADMIN — BISACODYL 5 MG: 5 TABLET, COATED ORAL at 09:05

## 2023-05-25 RX ADMIN — BUDESONIDE INHALATION 0.5 MG: 0.5 SUSPENSION RESPIRATORY (INHALATION) at 20:30

## 2023-05-25 RX ADMIN — Medication 10 ML: at 21:21

## 2023-05-25 RX ADMIN — GUAIFENESIN AND CODEINE PHOSPHATE 5 ML: 10; 100 LIQUID ORAL at 12:42

## 2023-05-25 RX ADMIN — CETIRIZINE HYDROCHLORIDE 10 MG: 10 TABLET, FILM COATED ORAL at 08:55

## 2023-05-25 RX ADMIN — DOCUSATE SODIUM 50 MG AND SENNOSIDES 8.6 MG 2 TABLET: 8.6; 5 TABLET, FILM COATED ORAL at 08:54

## 2023-05-25 RX ADMIN — ROSUVASTATIN 5 MG: 10 TABLET, FILM COATED ORAL at 21:21

## 2023-05-25 RX ADMIN — OXYBUTYNIN CHLORIDE 5 MG: 5 TABLET, EXTENDED RELEASE ORAL at 21:20

## 2023-05-25 RX ADMIN — REMDESIVIR 100 MG: 100 INJECTION, POWDER, LYOPHILIZED, FOR SOLUTION INTRAVENOUS at 09:47

## 2023-05-25 RX ADMIN — HYDROCHLOROTHIAZIDE 12.5 MG: 12.5 CAPSULE ORAL at 08:54

## 2023-05-25 RX ADMIN — LOSARTAN POTASSIUM 50 MG: 50 TABLET, FILM COATED ORAL at 08:54

## 2023-05-25 RX ADMIN — ACETAMINOPHEN 650 MG: 325 TABLET ORAL at 09:05

## 2023-05-25 RX ADMIN — ALBUTEROL SULFATE 2 PUFF: 90 AEROSOL, METERED RESPIRATORY (INHALATION) at 13:25

## 2023-05-25 RX ADMIN — DOCUSATE SODIUM 50 MG AND SENNOSIDES 8.6 MG 2 TABLET: 8.6; 5 TABLET, FILM COATED ORAL at 21:21

## 2023-05-25 RX ADMIN — AMITRIPTYLINE HYDROCHLORIDE 50 MG: 50 TABLET, FILM COATED ORAL at 21:21

## 2023-05-25 RX ADMIN — ENOXAPARIN SODIUM 40 MG: 100 INJECTION SUBCUTANEOUS at 21:21

## 2023-05-25 RX ADMIN — ENOXAPARIN SODIUM 40 MG: 100 INJECTION SUBCUTANEOUS at 08:54

## 2023-05-25 RX ADMIN — PANTOPRAZOLE SODIUM 40 MG: 40 TABLET, DELAYED RELEASE ORAL at 05:43

## 2023-05-25 RX ADMIN — AMLODIPINE BESYLATE 10 MG: 10 TABLET ORAL at 08:55

## 2023-05-25 NOTE — PROGRESS NOTES
[facility] Medicine Services  PROGRESS NOTE    Patient Name: Diane Manzanares  : 1960  MRN: [id number]    Date of Admission: 2023  Primary Care Physician: Cindy Rosa PA    Subjective   Subjective     CC:  F/U COVID    HPI:  Feels her breathing is improved, non prod cough. Occas soa.     ROS:  Gen- No fevers, chills  CV- No chest pain, palpitations  Resp- +cough, +dyspnea  GI- No N/V/D, abd pain    Objective   Objective     Vital Signs:   Temp:  [95.7 °F (35.4 °C)-98.5 °F (36.9 °C)] 95.7 °F (35.4 °C)  Heart Rate:  [] 78  Resp:  [18-22] 18  BP: (134-135)/(76-94) 134/82  Flow (L/min):  [1-2] 1     Physical Exam:  Constitutional: No acute distress, awake, alert  HENT: NCAT, mucous membranes moist  Respiratory: tight inspiratory wheezing with prolonged expiratory phase, respiratory effort normal 1LNC  Cardiovascular: RRR, no murmurs, rubs, or gallops  Gastrointestinal: Positive bowel sounds, soft, nontender, nondistended  Musculoskeletal: No bilateral ankle edema  Psychiatric: Appropriate affect, cooperative  Neurologic: Oriented x 3, strength symmetric in all extremities, Cranial Nerves grossly intact to confrontation, speech clear  Skin: No rashes     Results Reviewed:  LAB RESULTS:      Lab 23  0538 23  0718 23  1525   WBC 9.38 4.02 4.15   HEMOGLOBIN 13.2 13.5 12.4   HEMATOCRIT 42.1 43.2 41.1   PLATELETS 311 315 317   NEUTROS ABS 7.20* 2.97 1.73   IMMATURE GRANS (ABS) 0.07* 0.07* 0.01   LYMPHS ABS 1.48 0.88 1.49   MONOS ABS 0.62 0.09* 0.49   EOS ABS 0.00 0.00 0.41*   MCV 90.3 90.0 91.1   CRP 1.28*  --   --    PROCALCITONIN  --   --  0.03         Lab 23  0538 23  0718 23  1525   SODIUM 136 138 140   POTASSIUM 4.6 4.5 4.2   CHLORIDE 99 100 102   CO2 27.0 26.0 29.0   ANION GAP 10.0 12.0 9.0   BUN 17 12 11   CREATININE 0.76 0.64 0.71   EGFR 87.6 98.8 95.1   GLUCOSE 131* 133* 98   CALCIUM 9.8 9.5 9.0         Lab 23  0538  05/24/23  0718 05/23/23  1525   TOTAL PROTEIN 7.0 7.5 7.4   ALBUMIN 4.1 4.3 4.1   GLOBULIN 2.9 3.2 3.3   ALT (SGPT) 28 22 18   AST (SGOT) 24 26 24   BILIRUBIN <0.2 0.3 0.2   ALK PHOS 138* 151* 132*         Lab 05/23/23  1525   PROBNP 73.3   HSTROP T 12*                 Lab 05/23/23  1725   PH, ARTERIAL 7.386   PCO2, ARTERIAL 49.9*   PO2 ART 77.7*   FIO2 21   HCO3 ART 29.9*   BASE EXCESS ART 3.9*   CARBOXYHEMOGLOBIN 1.0     Brief Urine Lab Results     None          Microbiology Results Abnormal     None          XR Chest 1 View    Result Date: 5/23/2023  XR CHEST 1 VW Date of Exam: 5/23/2023 2:57 PM EDT Indication: SOA triage protocol Comparison: 5/2/2023. FINDINGS: No focal airspace opacity. No pleural effusion or pneumothorax. Normal heart and mediastinal contours.     Impression: No evidence of acute disease in the chest. Electronically Signed: Esau Pierson  5/23/2023 3:39 PM EDT  Workstation ID: [id number]      Results for orders placed in visit on 02/10/21    Adult Transthoracic Echo Complete W/ Cont if Necessary Per Protocol    Interpretation Summary  · Estimated left ventricular EF = 70% Left ventricular ejection fraction appears to be 66 - 70%. Left ventricular systolic function is normal.  · Left ventricular diastolic function is consistent with (grade I) impaired relaxation.  · The right ventricular cavity is mildly dilated.  · Estimated right ventricular systolic pressure from tricuspid regurgitation is mildly elevated (35-45 mmHg). Calculated right ventricular systolic pressure from tricuspid regurgitation is 42 mmHg.      Current medications:  Scheduled Meds:amitriptyline, 50 mg, Oral, Nightly  amLODIPine, 10 mg, Oral, Daily  budesonide, 0.5 mg, Nebulization, BID - RT  cetirizine, 10 mg, Oral, Daily  dexamethasone, 6 mg, Intravenous, Daily  enoxaparin, 40 mg, Subcutaneous, Q12H  losartan, 50 mg, Oral, Q24H   And  hydroCHLOROthiazide, 12.5 mg, Oral, Q24H  oxybutynin XL, 5 mg, Oral, Nightly  pantoprazole,  40 mg, Oral, Q AM  remdesivir, 100 mg, Intravenous, Q24H  rosuvastatin, 5 mg, Oral, Nightly  senna-docusate sodium, 2 tablet, Oral, BID  sodium chloride, 10 mL, Intravenous, Q12H      Continuous Infusions:Pharmacy Consult - Remdesivir, , Last Rate: Stopped (05/24/23 1413)      PRN Meds:.•  acetaminophen  •  albuterol sulfate HFA  •  senna-docusate sodium **AND** polyethylene glycol **AND** bisacodyl **AND** bisacodyl  •  guaiFENesin-codeine  •  ondansetron **OR** ondansetron  •  Pharmacy Consult - Remdesivir  •  sodium chloride  •  sodium chloride  •  sodium chloride    Assessment & Plan   Assessment & Plan     Active Hospital Problems    Diagnosis  POA   • **COVID [U07.1]  Yes      Resolved Hospital Problems   No resolved problems to display.        Brief Hospital Course to date:  Diane Manzanares is a 62 y.o. female with history of HTN, obesity who presented with worsening congestion, cough - symptoms started about 3 days PTA. Went to PCP  for routine visit and sent to ED.  Tested positive for covid, has had prior covid admission that required her to be on oxygen but no high flow.     This patient's problems and plans were partially entered by my partner and updated as appropriate by me 05/25/23.  All problems are new to me today    COVID  -DC Paxlovid, patient feeling worse today, now on O2.   -Start Remdesivir, Decadron (day 2) will complete third dose tomorrow and likely DC home; currently on 1LNC   -Wean O2 as tolerated  -Cough meds PRN  -Pulmicort BID    HTN  -Continue home meds    Expected Discharge Location and Transportation: Home  Expected Discharge   Expected Discharge Date: 5/26/2023; Expected Discharge Time:      DVT prophylaxis:  Medical DVT prophylaxis orders are present.     AM-PAC 6 Clicks Score (PT): 24 (05/25/23 0901)    CODE STATUS:   Code Status and Medical Interventions:   Ordered at: 05/23/23 1933     Level Of Support Discussed With:    Patient     Code Status (Patient has no pulse and  is not breathing):    CPR (Attempt to Resuscitate)     Medical Interventions (Patient has pulse or is breathing):    Full Support       Jenifer Lozada, APRN  05/25/23

## 2023-05-26 ENCOUNTER — READMISSION MANAGEMENT (OUTPATIENT)
Dept: CALL CENTER | Facility: HOSPITAL | Age: 63
End: 2023-05-26
Payer: COMMERCIAL

## 2023-05-26 VITALS
BODY MASS INDEX: 38.98 KG/M2 | SYSTOLIC BLOOD PRESSURE: 128 MMHG | DIASTOLIC BLOOD PRESSURE: 74 MMHG | HEIGHT: 63 IN | RESPIRATION RATE: 16 BRPM | TEMPERATURE: 97.6 F | HEART RATE: 85 BPM | OXYGEN SATURATION: 91 % | WEIGHT: 220 LBS

## 2023-05-26 LAB
ALBUMIN SERPL-MCNC: 3.6 G/DL (ref 3.5–5.2)
ALBUMIN/GLOB SERPL: 1.2 G/DL
ALP SERPL-CCNC: 121 U/L (ref 39–117)
ALT SERPL W P-5'-P-CCNC: 36 U/L (ref 1–33)
ANION GAP SERPL CALCULATED.3IONS-SCNC: 6 MMOL/L (ref 5–15)
AST SERPL-CCNC: 29 U/L (ref 1–32)
BILIRUB SERPL-MCNC: <0.2 MG/DL (ref 0–1.2)
BUN SERPL-MCNC: 17 MG/DL (ref 8–23)
BUN/CREAT SERPL: 25.8 (ref 7–25)
CALCIUM SPEC-SCNC: 8.6 MG/DL (ref 8.6–10.5)
CHLORIDE SERPL-SCNC: 101 MMOL/L (ref 98–107)
CO2 SERPL-SCNC: 31 MMOL/L (ref 22–29)
CREAT SERPL-MCNC: 0.66 MG/DL (ref 0.57–1)
EGFRCR SERPLBLD CKD-EPI 2021: 98.1 ML/MIN/1.73
GLOBULIN UR ELPH-MCNC: 3 GM/DL
GLUCOSE SERPL-MCNC: 95 MG/DL (ref 65–99)
POTASSIUM SERPL-SCNC: 4.3 MMOL/L (ref 3.5–5.2)
PROT SERPL-MCNC: 6.6 G/DL (ref 6–8.5)
SODIUM SERPL-SCNC: 138 MMOL/L (ref 136–145)

## 2023-05-26 PROCEDURE — G0378 HOSPITAL OBSERVATION PER HR: HCPCS

## 2023-05-26 PROCEDURE — 94664 DEMO&/EVAL PT USE INHALER: CPT

## 2023-05-26 PROCEDURE — 99239 HOSP IP/OBS DSCHRG MGMT >30: CPT | Performed by: NURSE PRACTITIONER

## 2023-05-26 PROCEDURE — 94799 UNLISTED PULMONARY SVC/PX: CPT

## 2023-05-26 PROCEDURE — 96376 TX/PRO/DX INJ SAME DRUG ADON: CPT

## 2023-05-26 PROCEDURE — 25010000002 REMDESIVIR 100 MG/20ML SOLUTION 1 EACH VIAL

## 2023-05-26 PROCEDURE — 96372 THER/PROPH/DIAG INJ SC/IM: CPT

## 2023-05-26 PROCEDURE — 25010000002 DEXAMETHASONE PER 1 MG: Performed by: FAMILY MEDICINE

## 2023-05-26 PROCEDURE — 25010000002 ENOXAPARIN PER 10 MG: Performed by: INTERNAL MEDICINE

## 2023-05-26 PROCEDURE — 80053 COMPREHEN METABOLIC PANEL: CPT | Performed by: INTERNAL MEDICINE

## 2023-05-26 RX ORDER — DEXAMETHASONE 6 MG/1
6 TABLET ORAL
Qty: 7 TABLET | Refills: 0
Start: 2023-05-26 | End: 2023-05-26 | Stop reason: SDUPTHER

## 2023-05-26 RX ORDER — DEXAMETHASONE 6 MG/1
6 TABLET ORAL
Qty: 7 TABLET | Refills: 0
Start: 2023-05-27 | End: 2023-05-26 | Stop reason: SDUPTHER

## 2023-05-26 RX ORDER — DEXAMETHASONE 6 MG/1
6 TABLET ORAL
Qty: 7 TABLET | Refills: 0
Start: 2023-05-26

## 2023-05-26 RX ORDER — DEXAMETHASONE 6 MG/1
6 TABLET ORAL
Qty: 7 TABLET | Refills: 0
Start: 2023-05-27 | End: 2023-05-26 | Stop reason: HOSPADM

## 2023-05-26 RX ADMIN — AMLODIPINE BESYLATE 10 MG: 10 TABLET ORAL at 09:31

## 2023-05-26 RX ADMIN — HYDROCHLOROTHIAZIDE 12.5 MG: 12.5 CAPSULE ORAL at 09:31

## 2023-05-26 RX ADMIN — ENOXAPARIN SODIUM 40 MG: 100 INJECTION SUBCUTANEOUS at 09:31

## 2023-05-26 RX ADMIN — LOSARTAN POTASSIUM 50 MG: 50 TABLET, FILM COATED ORAL at 09:31

## 2023-05-26 RX ADMIN — CETIRIZINE HYDROCHLORIDE 10 MG: 10 TABLET, FILM COATED ORAL at 09:31

## 2023-05-26 RX ADMIN — REMDESIVIR 100 MG: 100 INJECTION, POWDER, LYOPHILIZED, FOR SOLUTION INTRAVENOUS at 10:53

## 2023-05-26 RX ADMIN — BUDESONIDE INHALATION 0.5 MG: 0.5 SUSPENSION RESPIRATORY (INHALATION) at 08:27

## 2023-05-26 RX ADMIN — DOCUSATE SODIUM 50 MG AND SENNOSIDES 8.6 MG 2 TABLET: 8.6; 5 TABLET, FILM COATED ORAL at 09:31

## 2023-05-26 RX ADMIN — PANTOPRAZOLE SODIUM 40 MG: 40 TABLET, DELAYED RELEASE ORAL at 05:58

## 2023-05-26 RX ADMIN — Medication 10 ML: at 09:35

## 2023-05-26 RX ADMIN — DEXAMETHASONE SODIUM PHOSPHATE 6 MG: 4 INJECTION INTRA-ARTICULAR; INTRALESIONAL; INTRAMUSCULAR; INTRAVENOUS; SOFT TISSUE at 09:31

## 2023-05-26 NOTE — DISCHARGE SUMMARY
[facility] Medicine Services  DISCHARGE SUMMARY    Patient Name: Diane Manzanares  : 1960  MRN: [id number]    Date of Admission: 2023  4:32 PM  Date of Discharge:  23    Primary Care Physician: Cindy Rosa PA    Consults     No orders found from 2023 to 2023.          Hospital Course     Presenting Problem: Congestion, cough     Active Hospital Problems    Diagnosis  POA   • **COVID [U07.1]  Yes      Resolved Hospital Problems   No resolved problems to display.        Hospital Course:  Diane Manzanares is a 62 y.o. female with history of HTN, obesity who presented on 2023 with worsening congestion, cough - symptoms started about 3 days PTA. Went to PCP  for routine visit and sent to ED.  Tested positive for covid, has had prior covid admission that required her to be on oxygen but no high flow. She received 3 days of IV Remdesivir and will continue PO Decadron. CM will arrange home O2 as needed. The patient will be discharged today in stable condition.      COVID  -DC Paxlovid, patient feeling worse today, now on O2.   -S/p Remdesivir x3 days   - continue Decadron (day 3)  - currently on 1LNC, CM to arrange home O2  -Wean O2 as tolerated    HTN  -Continue home meds    Discharge Follow Up Recommendations for outpatient labs/diagnostics:   Follow up with PCP in 1 week.     Day of Discharge     HPI:   Patient seen resting in bed no apparent distress.  No acute events overnight per nursing.  She is feeling much better since admission and feels ready to discharge home today.  We discussed the importance of taking all medications as prescribed and keeping all recommended follow-up appointments.  She expressed no additional concerns at this time.    Review of Systems  Gen- No fevers, chills  CV- No chest pain, palpitations  Resp- + cough, dyspnea  GI- No N/V/D, abd pain        Vital Signs:   Temp:  [97 °F (36.1 °C)-97.6 °F (36.4 °C)] 97.6 °F (36.4  °C)  Heart Rate:  [67-91] 81  Resp:  [16] 16  BP: (128-137)/(74-99) 128/74  Flow (L/min):  [1] 1      Physical Exam:  Constitutional: No acute distress, awake, alert  HENT: NCAT, mucous membranes moist  Respiratory: grossly clear, diminished in bases, no wheezing, respiratory effort normal   Cardiovascular: RRR, no murmurs, cap refill brisk   Gastrointestinal: Positive bowel sounds, soft, nontender, nondistended  Musculoskeletal: No BLE edema   Psychiatric: Appropriate affect, cooperative  Neurologic: Oriented x 3, moves all extremities, speech clear  Skin: warm, dry, no visible rash     Pertinent  and/or Most Recent Results     LAB RESULTS:      Lab 05/25/23  0538 05/24/23 0718 05/23/23  1525   WBC 9.38 4.02 4.15   HEMOGLOBIN 13.2 13.5 12.4   HEMATOCRIT 42.1 43.2 41.1   PLATELETS 311 315 317   NEUTROS ABS 7.20* 2.97 1.73   IMMATURE GRANS (ABS) 0.07* 0.07* 0.01   LYMPHS ABS 1.48 0.88 1.49   MONOS ABS 0.62 0.09* 0.49   EOS ABS 0.00 0.00 0.41*   MCV 90.3 90.0 91.1   CRP 1.28*  --   --    PROCALCITONIN  --   --  0.03         Lab 05/26/23 0718 05/25/23  0538 05/24/23  0718 05/23/23  1525   SODIUM 138 136 138 140   POTASSIUM 4.3 4.6 4.5 4.2   CHLORIDE 101 99 100 102   CO2 31.0* 27.0 26.0 29.0   ANION GAP 6.0 10.0 12.0 9.0   BUN 17 17 12 11   CREATININE 0.66 0.76 0.64 0.71   EGFR 98.1 87.6 98.8 95.1   GLUCOSE 95 131* 133* 98   CALCIUM 8.6 9.8 9.5 9.0         Lab 05/26/23 0718 05/25/23  0538 05/24/23  0718 05/23/23  1525   TOTAL PROTEIN 6.6 7.0 7.5 7.4   ALBUMIN 3.6 4.1 4.3 4.1   GLOBULIN 3.0 2.9 3.2 3.3   ALT (SGPT) 36* 28 22 18   AST (SGOT) 29 24 26 24   BILIRUBIN <0.2 <0.2 0.3 0.2   ALK PHOS 121* 138* 151* 132*         Lab 05/23/23  1525   PROBNP 73.3   HSTROP T 12*                 Lab 05/23/23  1725   PH, ARTERIAL 7.386   PCO2, ARTERIAL 49.9*   PO2 ART 77.7*   FIO2 21   HCO3 ART 29.9*   BASE EXCESS ART 3.9*   CARBOXYHEMOGLOBIN 1.0     Brief Urine Lab Results     None        Microbiology Results (last 10 days)      Procedure Component Value - Date/Time    Respiratory Panel PCR w/COVID-19(SARS-CoV-2) REUBEN/[facility]RISTIAN/ELAYNE/PAD/COR/MAD/JAQUELIN In-House, NP Swab in UTM/VTM, 3-4 HR TAT - Swab, Nasopharynx [[id number]]  (Abnormal) Collected: 05/23/23 1520    Lab Status: Final result Specimen: Swab from Nasopharynx Updated: 05/23/23 1642     ADENOVIRUS, PCR Not Detected     Coronavirus 229E Not Detected     Coronavirus HKU1 Not Detected     Coronavirus NL63 Not Detected     Coronavirus OC43 Not Detected     COVID19 Detected     Human Metapneumovirus Not Detected     Human Rhinovirus/Enterovirus Not Detected     Influenza A PCR Not Detected     Influenza B PCR Not Detected     Parainfluenza Virus 1 Not Detected     Parainfluenza Virus 2 Not Detected     Parainfluenza Virus 3 Not Detected     Parainfluenza Virus 4 Not Detected     RSV, PCR Not Detected     Bordetella pertussis pcr Not Detected     Bordetella parapertussis PCR Not Detected     Chlamydophila pneumoniae PCR Not Detected     Mycoplasma pneumo by PCR Not Detected    Narrative:      In the setting of a positive respiratory panel with a viral infection PLUS a negative procalcitonin without other underlying concern for bacterial infection, consider observing off antibiotics or discontinuation of antibiotics and continue supportive care. If the respiratory panel is positive for atypical bacterial infection (Bordetella pertussis, Chlamydophila pneumoniae, or Mycoplasma pneumoniae), consider antibiotic de-escalation to target atypical bacterial infection.          XR Chest 1 View    Result Date: 5/23/2023  XR CHEST 1 VW Date of Exam: 5/23/2023 2:57 PM EDT Indication: SOA triage protocol Comparison: 5/2/2023. FINDINGS: No focal airspace opacity. No pleural effusion or pneumothorax. Normal heart and mediastinal contours.     No evidence of acute disease in the chest. Electronically Signed: Esau Pierson  5/23/2023 3:39 PM EDT  Workstation ID: [id number]      Results for orders placed during the  hospital encounter of 09/18/17    Doppler Arterial Multi Level Lower Extremity - Bilateral CAR    Interpretation Summary  · Right Conclusion: The right IVÁN is normal. The test is negative for exercise induced claudication.  · Left Conclusion: The left IVÁN is normal. The test is negative for exercise induced claudication.      Results for orders placed during the hospital encounter of 09/18/17    Doppler Arterial Multi Level Lower Extremity - Bilateral CAR    Interpretation Summary  · Right Conclusion: The right IVÁN is normal. The test is negative for exercise induced claudication.  · Left Conclusion: The left IVÁN is normal. The test is negative for exercise induced claudication.      Results for orders placed in visit on 02/10/21    Adult Transthoracic Echo Complete W/ Cont if Necessary Per Protocol    Interpretation Summary  · Estimated left ventricular EF = 70% Left ventricular ejection fraction appears to be 66 - 70%. Left ventricular systolic function is normal.  · Left ventricular diastolic function is consistent with (grade I) impaired relaxation.  · The right ventricular cavity is mildly dilated.  · Estimated right ventricular systolic pressure from tricuspid regurgitation is mildly elevated (35-45 mmHg). Calculated right ventricular systolic pressure from tricuspid regurgitation is 42 mmHg.      Plan for Follow-up of Pending Labs/Results: Follow up as directed.     Discharge Details        Discharge Medications      New Medications      Instructions Start Date   dexamethasone 6 MG tablet  Commonly known as: DECADRON   6 mg, Oral, Daily With Breakfast   Start Date: May 27, 2023        Continue These Medications      Instructions Start Date   albuterol sulfate  (90 Base) MCG/ACT inhaler  Commonly known as: PROVENTIL HFA;VENTOLIN HFA;PROAIR HFA   2 puffs, Inhalation, Every 4 Hours PRN      amitriptyline 25 MG tablet  Commonly known as: ELAVIL   Take 1-2 po qhs      amLODIPine 10 MG tablet  Commonly known  as: NORVASC   10 mg, Oral, Daily      fluticasone 110 MCG/ACT inhaler  Commonly known as: FLOVENT HFA   1 puff, Inhalation, 2 Times Daily - RT      ipratropium-albuterol 0.5-2.5 mg/3 ml nebulizer  Commonly known as: DUO-NEB   3 mL, Nebulization, Every 4 Hours PRN      loratadine 10 MG tablet  Commonly known as: Claritin   10 mg, Oral, Daily      losartan-hydrochlorothiazide 50-12.5 MG per tablet  Commonly known as: HYZAAR   1 tablet, Oral, Daily      meloxicam 7.5 MG tablet  Commonly known as: MOBIC   TAKE 1 TABLET BY MOUTH DAILY WITH FOOD      omeprazole 40 MG capsule  Commonly known as: priLOSEC   1 capsule by mouth twice daily      oxybutynin XL 5 MG 24 hr tablet  Commonly known as: Ditropan XL   5 mg, Oral, Daily      polyethylene glycol 17 g packet  Commonly known as: MIRALAX   17 g, Oral, Daily      rosuvastatin 5 MG tablet  Commonly known as: Crestor   5 mg, Oral, Daily      Wegovy 0.25 MG/0.5ML solution auto-injector  Generic drug: Semaglutide-Weight Management   0.25 mg, Subcutaneous, Weekly, If tolerated, after 4 weeks notify office to increase to 0.5 mg dose.             No Known Allergies      Discharge Disposition:  Home or Self Care    Diet:  Hospital:  Diet Order   Procedures   • Diet: Regular/House Diet; Texture: Regular Texture (IDDSI 7); Fluid Consistency: Thin (IDDSI 0)       Activity:  Activity Instructions     Activity as Tolerated                 CODE STATUS:    Code Status and Medical Interventions:   Ordered at: 05/23/23 1933     Level Of Support Discussed With:    Patient     Code Status (Patient has no pulse and is not breathing):    CPR (Attempt to Resuscitate)     Medical Interventions (Patient has pulse or is breathing):    Full Support       Future Appointments   Date Time Provider Department Center   5/30/2023 10:15 AM Cindy Rosa PA [facility]   9/22/2023  1:00 PM Cindy Rosa PA [facility]       Additional Instructions for the Follow-ups that You Need to Schedule      Discharge Follow-up with PCP   As directed       Currently Documented PCP:    Cindy Rosa PA    PCP Phone Number:    [phone]     Follow Up Details: Follow up with PCP in 1 week.                     Alonso Jose, APRN  05/26/23      Time Spent on Discharge:  I spent  43  minutes on this discharge activity which included: face-to-face encounter with the patient, reviewing the data in the system, coordination of the care with the nursing staff as well as consultants, documentation, and entering orders.

## 2023-05-26 NOTE — OUTREACH NOTE
Prep Survey    Flowsheet Row Responses   [facility] patient discharged from? [place]   Is LACE score < 7 ? Yes   Eligibility [facility]   Date of Admission 05/23/23   Date of Discharge 05/26/23   Discharge Disposition Home or Self Care   Discharge diagnosis COVID   Does the patient have one of the following disease processes/diagnoses(primary or secondary)? Other   Does the patient have Home health ordered? No   Is there a DME ordered? No   Prep survey completed? Yes          Jackie GONZALEZ - Registered Nurse

## 2023-05-28 ENCOUNTER — TRANSITIONAL CARE MANAGEMENT TELEPHONE ENCOUNTER (OUTPATIENT)
Dept: CALL CENTER | Facility: HOSPITAL | Age: 63
End: 2023-05-28
Payer: COMMERCIAL

## 2023-05-28 NOTE — OUTREACH NOTE
"Call Center TCM Note    Flowsheet Row Responses   [facility] patient discharged from? [place]   Does the patient have one of the following disease processes/diagnoses(primary or secondary)? Other   TCM attempt successful? Yes   Call start time 1413   Call end time 1414   Discharge diagnosis COVID   Meds reviewed with patient/caregiver? Yes   Is the patient having any side effects they believe may be caused by any medication additions or changes? No   Does the patient have all medications ordered at discharge? Yes   Is the patient taking all medications as directed (includes completed medication regime)? Yes   Comments Hosp dc fu apt on 5/31/23 with PCP   Does the patient have an appointment with their PCP within 7 days of discharge? Yes   Has home health visited the patient within 72 hours of discharge? N/A   Psychosocial issues? No   Did the patient receive a copy of their discharge instructions? Yes   Nursing interventions Reviewed instructions with patient   What is the patient's perception of their health status since discharge? Improving   Is the patient/caregiver able to teach back signs and symptoms related to disease process for when to call PCP? Yes   Is the patient/caregiver able to teach back signs and symptoms related to disease process for when to call 911? Yes   Is the patient/caregiver able to teach back the hierarchy of who to call/visit for symptoms/problems? PCP, Specialist, Home health nurse, Urgent Care, ED, 911 Yes   If the patient is a current smoker, are they able to teach back resources for cessation? Not a smoker   TCM call completed? Yes   Wrap up additional comments \"Doing better - breathing has improved\" - per pt   Call end time 1414          Jessie Santos RN    5/28/2023, 14:15 EDT      "

## 2023-05-31 ENCOUNTER — OFFICE VISIT (OUTPATIENT)
Dept: INTERNAL MEDICINE | Facility: CLINIC | Age: 63
End: 2023-05-31

## 2023-05-31 VITALS
HEIGHT: 63 IN | TEMPERATURE: 97.6 F | DIASTOLIC BLOOD PRESSURE: 76 MMHG | HEART RATE: 90 BPM | BODY MASS INDEX: 41.29 KG/M2 | SYSTOLIC BLOOD PRESSURE: 148 MMHG | WEIGHT: 233 LBS | OXYGEN SATURATION: 96 %

## 2023-05-31 DIAGNOSIS — E66.01 CLASS 2 SEVERE OBESITY WITH BODY MASS INDEX (BMI) OF 35 TO 39.9 WITH SERIOUS COMORBIDITY: ICD-10-CM

## 2023-05-31 DIAGNOSIS — U07.1 COVID: Primary | ICD-10-CM

## 2023-05-31 RX ORDER — PREDNISONE 10 MG/1
TABLET ORAL
COMMUNITY
Start: 2023-05-23

## 2023-05-31 NOTE — LETTER
May 31, 2023     Patient: Diane Manzanares   YOB: 1960   Date of Visit: 5/31/2023       To Whom it May Concern:    Diane Manzanares was seen in my clinic on 5/31/2023. She may return to work on 6/5/23 . Pt was hospitalized from 5/23/23- 5/26/23.    If you have any questions or concerns, please don't hesitate to call.         Sincerely,          JONE Flood        CC:   No Recipients

## 2023-05-31 NOTE — PROGRESS NOTES
Transitional Care Follow Up Visit  Subjective     Diane Manzanares is a 62 y.o. female who presents for a transitional care management visit.    Within 48 business hours after discharge our office contacted her via telephone to coordinate her care and needs.      I reviewed and discussed the details of that call along with the discharge summary, hospital problems, inpatient lab results, inpatient diagnostic studies, and consultation reports with Diane.     Current outpatient and discharge medications have been reconciled for the patient.  Reviewed by: JONE Sultana          5/26/2023     3:24 PM   Date of TCM Phone Call   [facility]   Date of Admission 5/23/2023   Date of Discharge 5/26/2023   Discharge Disposition Home or Self Care     Risk for Readmission (LACE) Score: 3 (5/26/2023  6:01 AM)      History of Present Illness   Course During Hospital Stay:  05/23/2023 to 05/26/2023    The patient was admitted to the hospital on 05/23/2023 for COVID-19. Her oxygen level was in the 80's. She is no longer on oxygen. She denies being sent home with oxygen due to her oxygen level improving before she was discharged from the hospital. She was treated with remdesivir, antiviral, Paxlovid, and steroids. She was discontinued from Paxlovid due to her experiencing malaise. She was discharged on 05/26/2023 with steroids. She started feeling better yesterday since she was sent home. She is out of work until 06/05/2023. She is able to work from home if needed. She is currently working part time from home. She missed her pulmonology appointment. She denies any coughing.    The patient asked for a urology referral because she was coughing so much and did not have any bladder control. However, it has improved.    The patient has gained 10 pounds since 05/26/2023. She was going to start Wegovy after recovering from COVID-19 but was unable to do so due to the high cost. She is open to trying Saxenda.  "She does not qualify for Ozempic because she is not a diabetic. She was not educated on Contrave at previous visit. She has taken Wellbutrin in the past and tolerated it well.     The following portions of the patient's history were reviewed and updated as appropriate: allergies, current medications, past family history, past medical history, past social history, past surgical history and problem list.    Review of Systems   Constitutional: Positive for fatigue. Negative for activity change and appetite change.   HENT: Negative for congestion and rhinorrhea.    Respiratory: Negative for chest tightness and shortness of breath.    Cardiovascular: Negative for chest pain and palpitations.   Gastrointestinal: Negative for abdominal pain.   Genitourinary: Negative for dysuria.   Musculoskeletal: Negative for arthralgias and myalgias.   Neurological: Negative for dizziness, weakness, light-headedness and headaches.   Psychiatric/Behavioral: Negative for dysphoric mood. The patient is not nervous/anxious.        Objective      /76   Pulse 90   Temp 97.6 °F (36.4 °C)   Ht 160 cm (63\")   Wt 106 kg (233 lb)   SpO2 96%   BMI 41.27 kg/m²     Physical Exam  Vitals and nursing note reviewed.   Constitutional:       Appearance: She is well-developed.   HENT:      Head: Normocephalic.      Right Ear: Hearing, tympanic membrane, ear canal and external ear normal.      Left Ear: Hearing, tympanic membrane, ear canal and external ear normal.      Nose: Nose normal.   Eyes:      Conjunctiva/sclera: Conjunctivae normal.      Pupils: Pupils are equal, round, and reactive to light.   Cardiovascular:      Rate and Rhythm: Normal rate and regular rhythm.      Heart sounds: Normal heart sounds.   Pulmonary:      Effort: Pulmonary effort is normal.      Breath sounds: Normal breath sounds. No decreased breath sounds, wheezing, rhonchi or rales.   Musculoskeletal:         General: Normal range of motion.      Cervical back: Normal " range of motion.   Skin:     General: Skin is warm and dry.   Neurological:      Mental Status: She is alert.   Psychiatric:         Behavior: Behavior normal.         Assessment & Plan   Diagnoses and all orders for this visit:    1. Class 2 severe obesity with body mass index (BMI) of 35 to 39.9 with serious comorbidity (Primary)  Assessment & Plan:  - Will prescribe Saxenda.   - Start at a low dose and slowly titrate up.       Orders:  -     Liraglutide (SAXENDA) 18 MG/3ML injection pen; Inject 0.6mg under skin daily for week one, THEN 1.2mg daily for week two, THEN 1.8mg daily for week three, then 2.4mg daily for week four.  Dispense: 3 mL; Refill: 5          Transcribed from ambient dictation for JONE Flood by Lashaun Lees  05/31/23   18:18 EDT    Patient or patient representative verbalized consent to the visit recording.  I have personally performed the services described in this document as transcribed by the above individual, and it is both accurate and complete.

## 2023-06-01 ENCOUNTER — TELEPHONE (OUTPATIENT)
Dept: INTERNAL MEDICINE | Facility: CLINIC | Age: 63
End: 2023-06-01
Payer: COMMERCIAL

## 2023-06-01 DIAGNOSIS — E66.01 CLASS 2 SEVERE OBESITY WITH BODY MASS INDEX (BMI) OF 35 TO 39.9 WITH SERIOUS COMORBIDITY: Primary | ICD-10-CM

## 2023-06-01 NOTE — TELEPHONE ENCOUNTER
Caller: Diane Manzanares    Relationship: Self    Best call back number: [phone]    PATIENT SPOKE WITH INSURANCE AND THEY DID NOT APPROVE THE INJECTABLE MEDICATION. THEY WILL APPROVE WELLBUTRIN (GENERIC FORM)    PLEASE SEND THAT IN.

## 2023-06-02 NOTE — TELEPHONE ENCOUNTER
Caller: Diane Manzanares    Relationship to patient: Self    Best call back number: [phone]    Patient is needing: PATIENT CALLED CHECKING THE STATUS OF WELLBUTRIN. PLEASE ADVISE

## 2023-06-05 NOTE — TELEPHONE ENCOUNTER
Does she want to try just Wellbutrin? Or Contrave, the weight loss medication with wellbutrin and naltrexone? I can send Contrave to the mail order pharmacy to see if they can get it covered for $100/month.

## 2023-07-09 ENCOUNTER — HOSPITAL ENCOUNTER (EMERGENCY)
Facility: HOSPITAL | Age: 63
Discharge: HOME | End: 2023-07-09
Attending: EMERGENCY MEDICINE | Admitting: EMERGENCY MEDICINE
Payer: COMMERCIAL

## 2023-07-09 ENCOUNTER — APPOINTMENT (OUTPATIENT)
Dept: CT IMAGING | Facility: HOSPITAL | Age: 63
End: 2023-07-09
Payer: COMMERCIAL

## 2023-07-09 VITALS
HEIGHT: 63 IN | HEART RATE: 93 BPM | DIASTOLIC BLOOD PRESSURE: 89 MMHG | TEMPERATURE: 97.8 F | RESPIRATION RATE: 20 BRPM | SYSTOLIC BLOOD PRESSURE: 152 MMHG | OXYGEN SATURATION: 99 % | BODY MASS INDEX: 40.75 KG/M2 | WEIGHT: 230 LBS

## 2023-07-09 DIAGNOSIS — R55 SYNCOPE, UNSPECIFIED SYNCOPE TYPE: Primary | ICD-10-CM

## 2023-07-09 LAB
ALBUMIN SERPL-MCNC: 4.1 G/DL (ref 3.5–5.2)
ALBUMIN/GLOB SERPL: 1.5 G/DL
ALP SERPL-CCNC: 127 U/L (ref 39–117)
ALT SERPL W P-5'-P-CCNC: 22 U/L (ref 1–33)
ANION GAP SERPL CALCULATED.3IONS-SCNC: 11 MMOL/L (ref 5–15)
AST SERPL-CCNC: 24 U/L (ref 1–32)
BASOPHILS # BLD AUTO: 0.03 10*3/MM3 (ref 0–0.2)
BASOPHILS NFR BLD AUTO: 0.6 % (ref 0–1.5)
BILIRUB SERPL-MCNC: 0.3 MG/DL (ref 0–1.2)
BUN SERPL-MCNC: 10 MG/DL (ref 8–23)
BUN/CREAT SERPL: 11.5 (ref 7–25)
CALCIUM SPEC-SCNC: 8.9 MG/DL (ref 8.6–10.5)
CHLORIDE SERPL-SCNC: 99 MMOL/L (ref 98–107)
CO2 SERPL-SCNC: 29 MMOL/L (ref 22–29)
CREAT SERPL-MCNC: 0.87 MG/DL (ref 0.57–1)
DEPRECATED RDW RBC AUTO: 47.2 FL (ref 37–54)
EGFRCR SERPLBLD CKD-EPI 2021: 74.5 ML/MIN/1.73
EOSINOPHIL # BLD AUTO: 0.58 10*3/MM3 (ref 0–0.4)
EOSINOPHIL NFR BLD AUTO: 11.5 % (ref 0.3–6.2)
ERYTHROCYTE [DISTWIDTH] IN BLOOD BY AUTOMATED COUNT: 14.2 % (ref 12.3–15.4)
GLOBULIN UR ELPH-MCNC: 2.7 GM/DL
GLUCOSE SERPL-MCNC: 117 MG/DL (ref 65–99)
HCT VFR BLD AUTO: 39.1 % (ref 34–46.6)
HGB BLD-MCNC: 12.1 G/DL (ref 12–15.9)
HOLD SPECIMEN: NORMAL
IMM GRANULOCYTES # BLD AUTO: 0.02 10*3/MM3 (ref 0–0.05)
IMM GRANULOCYTES NFR BLD AUTO: 0.4 % (ref 0–0.5)
LYMPHOCYTES # BLD AUTO: 0.93 10*3/MM3 (ref 0.7–3.1)
LYMPHOCYTES NFR BLD AUTO: 18.4 % (ref 19.6–45.3)
MAGNESIUM SERPL-MCNC: 2.1 MG/DL (ref 1.6–2.4)
MCH RBC QN AUTO: 28.2 PG (ref 26.6–33)
MCHC RBC AUTO-ENTMCNC: 30.9 G/DL (ref 31.5–35.7)
MCV RBC AUTO: 91.1 FL (ref 79–97)
MONOCYTES # BLD AUTO: 0.53 10*3/MM3 (ref 0.1–0.9)
MONOCYTES NFR BLD AUTO: 10.5 % (ref 5–12)
NEUTROPHILS NFR BLD AUTO: 2.97 10*3/MM3 (ref 1.7–7)
NEUTROPHILS NFR BLD AUTO: 58.6 % (ref 42.7–76)
NRBC BLD AUTO-RTO: 0 /100 WBC (ref 0–0.2)
PLATELET # BLD AUTO: 261 10*3/MM3 (ref 140–450)
PMV BLD AUTO: 9.4 FL (ref 6–12)
POTASSIUM SERPL-SCNC: 3.6 MMOL/L (ref 3.5–5.2)
PROT SERPL-MCNC: 6.8 G/DL (ref 6–8.5)
RBC # BLD AUTO: 4.29 10*6/MM3 (ref 3.77–5.28)
SODIUM SERPL-SCNC: 139 MMOL/L (ref 136–145)
TROPONIN T SERPL HS-MCNC: 10 NG/L
WBC NRBC COR # BLD: 5.06 10*3/MM3 (ref 3.4–10.8)
WHOLE BLOOD HOLD COAG: NORMAL
WHOLE BLOOD HOLD SPECIMEN: NORMAL

## 2023-07-09 PROCEDURE — 25510000001 IOPAMIDOL PER 1 ML: Performed by: EMERGENCY MEDICINE

## 2023-07-09 PROCEDURE — 71275 CT ANGIOGRAPHY CHEST: CPT

## 2023-07-09 PROCEDURE — 72125 CT NECK SPINE W/O DYE: CPT

## 2023-07-09 PROCEDURE — 70450 CT HEAD/BRAIN W/O DYE: CPT

## 2023-07-09 PROCEDURE — 83735 ASSAY OF MAGNESIUM: CPT | Performed by: EMERGENCY MEDICINE

## 2023-07-09 PROCEDURE — 80053 COMPREHEN METABOLIC PANEL: CPT | Performed by: EMERGENCY MEDICINE

## 2023-07-09 PROCEDURE — 84484 ASSAY OF TROPONIN QUANT: CPT | Performed by: EMERGENCY MEDICINE

## 2023-07-09 PROCEDURE — 96374 THER/PROPH/DIAG INJ IV PUSH: CPT

## 2023-07-09 PROCEDURE — 99283 EMERGENCY DEPT VISIT LOW MDM: CPT

## 2023-07-09 PROCEDURE — 25010000002 ONDANSETRON PER 1 MG: Performed by: EMERGENCY MEDICINE

## 2023-07-09 PROCEDURE — 93005 ELECTROCARDIOGRAM TRACING: CPT | Performed by: EMERGENCY MEDICINE

## 2023-07-09 PROCEDURE — 85025 COMPLETE CBC W/AUTO DIFF WBC: CPT | Performed by: EMERGENCY MEDICINE

## 2023-07-09 RX ORDER — ONDANSETRON 2 MG/ML
4 INJECTION INTRAMUSCULAR; INTRAVENOUS ONCE
Status: COMPLETED | OUTPATIENT
Start: 2023-07-09 | End: 2023-07-09

## 2023-07-09 RX ORDER — SODIUM CHLORIDE 0.9 % (FLUSH) 0.9 %
10 SYRINGE (ML) INJECTION AS NEEDED
Status: DISCONTINUED | OUTPATIENT
Start: 2023-07-09 | End: 2023-07-09 | Stop reason: HOSPADM

## 2023-07-09 RX ADMIN — IOPAMIDOL 75 ML: 755 INJECTION, SOLUTION INTRAVENOUS at 18:04

## 2023-07-09 RX ADMIN — ONDANSETRON 4 MG: 2 INJECTION INTRAMUSCULAR; INTRAVENOUS at 17:43

## 2023-07-09 NOTE — ED PROVIDER NOTES
[place]    EMERGENCY DEPARTMENT ENCOUNTER      Pt Name: Diane Manzanares  MRN: [id number]  YOB: 1960  Date of evaluation: 7/9/2023  Provider: Fernando Chi MD    CHIEF COMPLAINT       Chief Complaint   Patient presents with    Syncope         HISTORY OF PRESENT ILLNESS   Diane Manzanares is a 62 y.o. female who presents to the emergency department ***       Nursing notes were reviewed.    REVIEW OF SYSTEMS     ROS:  A chief complaint appropriate review of systems was completed and is negative except as noted in the HPI.      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    Insomnia     Menopausal symptoms 9/2/2016    Morbid obesity due to excess calories 8/8/2016    Overactive bladder 9/2/2016    Pain of both hip joints 8/8/2016    Rectal polyp 4/3/2020    Sarcoidosis of lung 9/2/2016    Sarcoidosis of skin 9/2/2016         SURGICAL HISTORY       Past Surgical History:   Procedure Laterality Date    HYSTERECTOMY      SHOULDER SURGERY      Right shoulder    TUBAL ABDOMINAL LIGATION           CURRENT MEDICATIONS       Current Facility-Administered Medications:     sodium chloride 0.9 % flush 10 mL, 10 mL, Intravenous, PRN, Fernando Chi MD    Current Outpatient Medications:     albuterol (PROVENTIL) (2.5 MG/3ML) 0.083% nebulizer solution, Take 2.5 mg by nebulization Every 4 (Four) Hours As Needed for Wheezing or Shortness of Air (or PRN cough)., Disp: 25 each, Rfl: 0    albuterol sulfate  (90 Base) MCG/ACT inhaler, Inhale 2 puffs Every 4 (Four) Hours As Needed for Wheezing or Shortness of Air., Disp: 18 g, Rfl: 0    amitriptyline (ELAVIL) 25 MG tablet, Take 1-2 po qhs, Disp: 180 tablet, Rfl: 3    amLODIPine (NORVASC) 10 MG tablet, Take 1 tablet by mouth Daily., Disp: 90  tablet, Rfl: 3    benzonatate (TESSALON) 200 MG capsule, Take 1 capsule by mouth 3 (Three) Times a Day As Needed for Cough., Disp: 21 capsule, Rfl: 0    ipratropium-albuterol (DUO-NEB) 0.5-2.5 mg/3 ml nebulizer, Take 3 mL by nebulization Every 4 (Four) Hours As Needed for Wheezing or Shortness of Air., Disp: 360 mL, Rfl: 5    ipratropium-albuterol (DUO-NEB) 0.5-2.5 mg/3 ml nebulizer, Take 3 mL by nebulization 4 (Four) Times a Day As Needed for Wheezing., Disp: 360 mL, Rfl: 11    loratadine (Claritin) 10 MG tablet, Take 1 tablet by mouth Daily., Disp: 30 tablet, Rfl: 3    losartan-hydrochlorothiazide (HYZAAR) 50-12.5 MG per tablet, Take 1 tablet by mouth Daily., Disp: 90 tablet, Rfl: 3    meloxicam (MOBIC) 7.5 MG tablet, TAKE 1 TABLET BY MOUTH DAILY WITH FOOD, Disp: 30 tablet, Rfl: 1    naltrexone-bupropion ER (CONTRAVE) 8-90 MG tablet, Wk 1: 1 tab daily, Wk 2: 1 tab twice a day, Wk 3: 2 tabs in AM, 1 tab in PM, Wk 4: 2 tabs twice a day, Maintenance dose: 2 tabs twice daily., Disp: 120 tablet, Rfl: 3    omeprazole (priLOSEC) 40 MG capsule, 1 capsule by mouth twice daily, Disp: 180 capsule, Rfl: 3    oxybutynin XL (Ditropan XL) 5 MG 24 hr tablet, Take 1 tablet by mouth Daily., Disp: 90 tablet, Rfl: 3    polyethylene glycol (MIRALAX) packet, Take 17 g by mouth Daily., Disp: 30 each, Rfl: 3    predniSONE (DELTASONE) 10 MG tablet, Take 4 tabs daily x 3 days, then take 3 tabs daily x 3 days, then take 2 tabs daily x 3 days, then take 1 tab daily x 3 days, Disp: 31 tablet, Rfl: 0    promethazine-dextromethorphan (PROMETHAZINE-DM) 6.25-15 MG/5ML syrup, Take 5 mL by mouth 4 (Four) Times a Day As Needed for Cough. Will cause drowsiness, Disp: 180 mL, Rfl: 0    rosuvastatin (Crestor) 5 MG tablet, Take 1 tablet by mouth Daily., Disp: 90 tablet, Rfl: 3    ALLERGIES     Patient has no known allergies.    FAMILY HISTORY       Family History   Problem Relation Age of Onset    Hyperlipidemia Mother     Osteoarthritis Mother      "Hypertension Mother     Arthritis Father     Heart failure Father     Diabetes Father     Hypertension Father     Kidney disease Father     Stroke Father     Obesity Other           SOCIAL HISTORY       Social History     Socioeconomic History    Marital status:    Tobacco Use    Smoking status: Former     Packs/day: 1.00     Years: 15.00     Pack years: 15.00     Types: Cigarettes     Quit date: 2020     Years since quitting: 3.5    Smokeless tobacco: Never    Tobacco comments:     quit 25 yrs ago; smoked 1ppd x 15 yrs    Vaping Use    Vaping Use: Never used   Substance and Sexual Activity    Alcohol use: Never    Drug use: Never    Sexual activity: Defer         PHYSICAL EXAM    (up to 7 for level 4, 8 or more for level 5)     Vitals:    07/09/23 1600   BP: 152/89   BP Location: Left arm   Patient Position: Sitting   Pulse: 93   Resp: 20   Temp: 97.8 °F (36.6 °C)   TempSrc: Oral   SpO2: 99%   Weight: 104 kg (230 lb)   Height: 160 cm (63\")       ***      DIAGNOSTIC RESULTS     EKG: All EKGs are interpreted by the Emergency Department Physician who either signs or Co-signs this chart in the absence of a cardiologist.    ECG 12 Lead ED Triage Standing Order; Syncope    (Results Pending)         RADIOLOGY:   [x] Radiologist's Report Reviewed:  No orders to display       I ordered and independently reviewed the above noted radiographic studies.        LABS:    I have reviewed and interpreted all of the currently available lab results from this visit (if applicable):  Results for orders placed or performed in visit on 06/29/23   TSH    Specimen: Arm, Left; Blood   Result Value Ref Range    TSH 4.600 (H) 0.270 - 4.200 uIU/mL   Lipid Panel    Specimen: Arm, Left; Blood   Result Value Ref Range    Total Cholesterol 177 0 - 200 mg/dL    Triglycerides 100 0 - 150 mg/dL    HDL Cholesterol 60 40 - 60 mg/dL    LDL Cholesterol  99 0 - 100 mg/dL    VLDL Cholesterol 18 5 - 40 mg/dL    LDL/HDL Ratio 1.62    Comprehensive " Metabolic Panel    Specimen: Arm, Left; Blood   Result Value Ref Range    Glucose 76 65 - 99 mg/dL    BUN 9 8 - 23 mg/dL    Creatinine 0.89 0.57 - 1.00 mg/dL    Sodium 136 136 - 145 mmol/L    Potassium 5.3 (H) 3.5 - 5.2 mmol/L    Chloride 99 98 - 107 mmol/L    CO2 26.0 22.0 - 29.0 mmol/L    Calcium 9.4 8.6 - 10.5 mg/dL    Total Protein 7.0 6.0 - 8.5 g/dL    Albumin 4.4 3.5 - 5.2 g/dL    ALT (SGPT) 30 1 - 33 U/L    AST (SGOT) 23 1 - 32 U/L    Alkaline Phosphatase 128 (H) 39 - 117 U/L    Total Bilirubin 0.3 0.0 - 1.2 mg/dL    Globulin 2.6 gm/dL    A/G Ratio 1.7 g/dL    BUN/Creatinine Ratio 10.1 7.0 - 25.0    Anion Gap 11.0 5.0 - 15.0 mmol/L    eGFR 72.5 >60.0 mL/min/1.73   Fungal Antibodies, Quantitative Double Immunodiffusion    Specimen: Arm, Left; Blood   Result Value Ref Range    Aspergillus fumigatus Negative Neg:<1:1    Aspergillus flavus Negative Neg:<1:1    Aspergillus niger Negative Neg:<1:1    Blastomyces Abs, Qn, DID Negative Neg:<1:1   IgE Level    Specimen: Arm, Left; Blood   Result Value Ref Range    IgE 108 6 - 495 IU/mL   Allergens, Zone 8    Specimen: Arm, Left; Blood   Result Value Ref Range    Class Description Comment     D. pteronyssinus (dust mite) <0.10 Class 0 kU/L    D. farinae (dust mite) <0.10 Class 0 kU/L    Cat Dander <0.10 Class 0 kU/L    Dog Dander, IgE <0.10 Class 0 kU/L    [place] Grass <0.10 Class 0 kU/L    [place] Bluegrass IgE <0.10 Class 0 kU/L    [place] Grass <0.10 Class 0 kU/L    [place] Grass <0.10 Class 0 kU/L    Cockroach, American <0.10 Class 0 kU/L    Penicillium chrysogen <0.10 Class 0 kU/L    Cladosporium herbarum <0.10 Class 0 kU/L    Aspergillus fumigatus <0.10 Class 0 kU/L    Mucor racemosus <0.10 Class 0 kU/L    Alternaria alternata <0.10 Class 0 kU/L    Stemphylium herbarum <0.10 Class 0 kU/L    [place], White <0.10 Class 0 kU/L    Elm, American <0.10 Class 0 kU/L    Maple/Gilmer <0.10 Class 0 kU/L    Hazelnut Tree <0.10 Class 0 kU/L    [place], White <0.10 Class 0  kU/L    Maple [place] <0.10 Class 0 kU/L    White [place] <0.10 Class 0 kU/L    [place], Mountain <0.10 Class 0 kU/L    Sweet Gum <0.10 Class 0 kU/L    Ragweed, Common/Short <0.10 Class 0 kU/L    Mugwort <0.10 Class 0 kU/L    English Plantain <0.10 Class 0 kU/L    Pigweed, Rough/Common <0.10 Class 0 kU/L    Sheep Sorrel <0.10 Class 0 kU/L    Nettle <0.10 Class 0 kU/L   Aspergillus Fumigatus IgE    Specimen: Arm, Left; Blood   Result Value Ref Range    Aspergillus fumigatus <0.10 Class 0 kU/L   Angiotensin Converting Enzyme    Specimen: Arm, Left; Blood   Result Value Ref Range    Angiotensin Converting Enzyme 49 14 - 82 U/L   ANCA Panel    Specimen: Arm, Left; Blood   Result Value Ref Range    ANTI-MPO ANTIBODIES <0.2 0.0 - 0.9 units    ANTI-PR3 ANTIBODIES <0.2 0.0 - 0.9 units    C-ANCA <1:20 Neg:<1:20 titer    P-ANCA <1:20 Neg:<1:20 titer    Atypical pANCA <1:20 Neg:<1:20 titer   ZIYAD Screen    Specimen: Arm, Left; Blood   Result Value Ref Range    ZIYAD Direct Negative Negative        If labs were ordered, I independently reviewed the results and considered them in treating the patient.      EMERGENCY DEPARTMENT COURSE and DIFFERENTIAL DIAGNOSIS/MDM:   Vitals:  AS OF 16:04 EDT    BP - 152/89  HR - 93  TEMP - 97.8 °F (36.6 °C) (Oral)  O2 SATS - 99%        Discussion below represents my analysis of pertinent findings related to patient's condition, differential diagnosis, treatment plan and final disposition.      Differential diagnosis:  The differential diagnosis associated with the patient's presentation includes: ***      Independent interpretations (ECG/rhythm strip/X-ray/US/CT scan): ***      Additional sources:  Discussed/obtained information from independent historians:   [] Spouse:   [] Parent:   [] Friend:   [] EMS:   [] Other:  External (non-ED) record review:   [] Inpatient record:   [] Office record:   [] Outpatient record:   [] Prior Outpatient labs:   [] Prior Outpatient radiology:   [] Primary Care  record:   [] Outside ED record:   [] Other:       Patient's care impacted by:   [] Diabetes   [] Hypertension   [] Coronary Artery Disease   [] Cancer   [] Other:     Care significantly affected by Social Determinants of Health (housing and economic circumstances, unemployment)    [] Yes     [] No   If yes, Patient's care significantly limited by  Social Determinants of Health including:    [] Inadequate housing    [] Low income    [] Alcoholism and drug addiction in family    [] Problems related to primary support group    [] Unemployment    [] Problems related to employment    [] Other Social Determinants of Health:       Consideration of admission/observation vs discharge: ***      I considered prescription management with: ***   [] Pain medication:   [] Antiviral:   [] Antibiotic:   [] Other:    Additional orders considered but not ordered:  The following testing was considered but ultimately not selected after discussion with patient/family: ***    ED Course:           ***    I had a discussion with the patient/family regarding diagnosis, diagnostic results, treatment plan, and medications.  The patient/family indicated understanding of these instructions.  I spent adequate time at the bedside preceding discharge necessary to personally discuss the aftercare instructions, giving patient education, providing explanations of the results of our evaluations/findings, and my decision making to assure that the patient/family understand the plan of care.  Time was allotted to answer questions at that time and throughout the ED course.  Emphasis was placed on timely follow-up after discharge.  I also discussed the potential for the development of an acute emergent condition requiring further evaluation, admission, or even surgical intervention. I discussed that we found nothing during the visit today indicating the need for further workup, admission, or the presence of an unstable medical condition.  I encouraged the  patient to return to the emergency department immediately for ANY concerns, worsening, new complaints, or if symptoms persist and unable to seek follow-up in a timely fashion.  The patient/family expressed understanding and agreement with this plan.  The patient will follow-up with their PCP in 1-2 days for reevaluation.           PROCEDURES:  Procedures    CRITICAL CARE TIME        FINAL IMPRESSION    No diagnosis found.      DISPOSITION/PLAN     ED Disposition       None              Comment: Please note this report has been produced using speech recognition software.      Fernando Chi MD  Attending Emergency Physician           Comment   --                 Comment: Please note this report has been produced using speech recognition software.      Fernando Chi MD  Attending Emergency Physician             Fernando Chi MD  07/27/23 1215       Fernando Chi MD  07/27/23 1226

## 2023-07-12 LAB
QT INTERVAL: 348 MS
QTC INTERVAL: 421 MS

## 2023-08-18 ENCOUNTER — TELEPHONE (OUTPATIENT)
Dept: PULMONOLOGY | Facility: CLINIC | Age: 63
End: 2023-08-18
Payer: COMMERCIAL

## 2023-09-14 PROCEDURE — 87186 SC STD MICRODIL/AGAR DIL: CPT | Performed by: NURSE PRACTITIONER

## 2023-09-14 PROCEDURE — 87077 CULTURE AEROBIC IDENTIFY: CPT | Performed by: NURSE PRACTITIONER

## 2023-09-14 PROCEDURE — 87086 URINE CULTURE/COLONY COUNT: CPT | Performed by: NURSE PRACTITIONER

## 2023-09-15 ENCOUNTER — TELEPHONE (OUTPATIENT)
Dept: URGENT CARE | Facility: CLINIC | Age: 63
End: 2023-09-15

## 2023-09-15 DIAGNOSIS — G62.9 NEUROPATHY: ICD-10-CM

## 2023-09-15 RX ORDER — AMITRIPTYLINE HYDROCHLORIDE 25 MG/1
TABLET, FILM COATED ORAL
Qty: 180 TABLET | Refills: 0 | Status: SHIPPED | OUTPATIENT
Start: 2023-09-15

## 2023-10-08 DIAGNOSIS — I10 ESSENTIAL HYPERTENSION: ICD-10-CM

## 2023-10-09 RX ORDER — LOSARTAN POTASSIUM AND HYDROCHLOROTHIAZIDE 12.5; 5 MG/1; MG/1
1 TABLET ORAL DAILY
Qty: 90 TABLET | Refills: 3 | OUTPATIENT
Start: 2023-10-09

## 2023-10-24 ENCOUNTER — TELEPHONE (OUTPATIENT)
Dept: INTERNAL MEDICINE | Facility: CLINIC | Age: 63
End: 2023-10-24

## 2023-10-24 NOTE — TELEPHONE ENCOUNTER
Caller: Diane Manzanares    Relationship to patient: Self    Best call back number:      Chief complaint: SCHEDULING    Type of visit: NEW PATIENT OFFBOARDING FROM [facility]/PHYSICAL    Requested date: NEXT AVAILABLE     If rescheduling, when is the original appointment: NA     Additional notes:PATIENT UNABLE TO MAKE APPT ON [id number] WITH DR [email or web address]

## 2023-11-17 ENCOUNTER — LAB (OUTPATIENT)
Dept: INTERNAL MEDICINE | Facility: CLINIC | Age: 63
End: 2023-11-17
Payer: COMMERCIAL

## 2023-11-17 ENCOUNTER — OFFICE VISIT (OUTPATIENT)
Dept: INTERNAL MEDICINE | Facility: CLINIC | Age: 63
End: 2023-11-17
Payer: COMMERCIAL

## 2023-11-17 VITALS
RESPIRATION RATE: 18 BRPM | WEIGHT: 224 LBS | HEIGHT: 63 IN | SYSTOLIC BLOOD PRESSURE: 122 MMHG | DIASTOLIC BLOOD PRESSURE: 80 MMHG | BODY MASS INDEX: 39.69 KG/M2

## 2023-11-17 DIAGNOSIS — Z13.29 THYROID DISORDER SCREENING: ICD-10-CM

## 2023-11-17 DIAGNOSIS — E55.9 VITAMIN D DEFICIENCY: ICD-10-CM

## 2023-11-17 DIAGNOSIS — Z13.220 LIPID SCREENING: ICD-10-CM

## 2023-11-17 DIAGNOSIS — E78.2 MIXED HYPERLIPIDEMIA: ICD-10-CM

## 2023-11-17 DIAGNOSIS — U09.9 COVID-19 LONG HAULER: ICD-10-CM

## 2023-11-17 DIAGNOSIS — M54.2 NECK PAIN: ICD-10-CM

## 2023-11-17 DIAGNOSIS — Z13.0 SCREENING FOR BLOOD DISEASE: ICD-10-CM

## 2023-11-17 DIAGNOSIS — M40.202 KYPHOSIS OF CERVICAL REGION, UNSPECIFIED KYPHOSIS TYPE: ICD-10-CM

## 2023-11-17 DIAGNOSIS — Z13.21 ENCOUNTER FOR VITAMIN DEFICIENCY SCREENING: ICD-10-CM

## 2023-11-17 DIAGNOSIS — G62.9 NEUROPATHY: ICD-10-CM

## 2023-11-17 DIAGNOSIS — I10 PRIMARY HYPERTENSION: Primary | ICD-10-CM

## 2023-11-17 DIAGNOSIS — F51.01 PRIMARY INSOMNIA: ICD-10-CM

## 2023-11-17 PROCEDURE — 82306 VITAMIN D 25 HYDROXY: CPT | Performed by: NURSE PRACTITIONER

## 2023-11-17 PROCEDURE — 80061 LIPID PANEL: CPT | Performed by: NURSE PRACTITIONER

## 2023-11-17 PROCEDURE — 82746 ASSAY OF FOLIC ACID SERUM: CPT | Performed by: NURSE PRACTITIONER

## 2023-11-17 PROCEDURE — 83036 HEMOGLOBIN GLYCOSYLATED A1C: CPT | Performed by: NURSE PRACTITIONER

## 2023-11-17 PROCEDURE — 80050 GENERAL HEALTH PANEL: CPT | Performed by: NURSE PRACTITIONER

## 2023-11-17 PROCEDURE — 36415 COLL VENOUS BLD VENIPUNCTURE: CPT | Performed by: NURSE PRACTITIONER

## 2023-11-17 PROCEDURE — 82607 VITAMIN B-12: CPT | Performed by: NURSE PRACTITIONER

## 2023-11-17 RX ORDER — METHOCARBAMOL 750 MG/1
750 TABLET, FILM COATED ORAL 4 TIMES DAILY PRN
Qty: 60 TABLET | Refills: 1 | Status: SHIPPED | OUTPATIENT
Start: 2023-11-17

## 2023-11-17 RX ORDER — OXYBUTYNIN CHLORIDE 10 MG/1
10 TABLET, EXTENDED RELEASE ORAL DAILY
Qty: 30 TABLET | Refills: 1 | Status: SHIPPED | OUTPATIENT
Start: 2023-11-17

## 2023-11-17 RX ORDER — ALBUTEROL SULFATE 90 UG/1
2 AEROSOL, METERED RESPIRATORY (INHALATION) EVERY 4 HOURS PRN
Qty: 18 G | Refills: 1 | Status: SHIPPED | OUTPATIENT
Start: 2023-11-17

## 2023-11-17 RX ORDER — AMITRIPTYLINE HYDROCHLORIDE 25 MG/1
TABLET, FILM COATED ORAL
Qty: 180 TABLET | Refills: 1 | Status: SHIPPED | OUTPATIENT
Start: 2023-11-17

## 2023-11-17 RX ORDER — GABAPENTIN 300 MG/1
300 CAPSULE ORAL NIGHTLY PRN
Qty: 30 CAPSULE | Refills: 0 | Status: SHIPPED | OUTPATIENT
Start: 2023-11-17

## 2023-11-17 NOTE — PROGRESS NOTES
Subjective   Chief Complaint   Patient presents with    Establish Care     New patient      Diane Manzanares is a 63 y.o. female.     The patient is here today to establish care. She has a history of some chronic disorders but wants a physical. This will not be able to be completed today.    For hypertension she is currently taking losartan, hydrochlorothiazide, and amlodipine.    She has a history of bronchitis. She is not currently using any inhalers. She was on oxygen due to COVID-19. She is considered a long hauler. She still must use her albuterol inhaler occasionally. She does not have an inhaler. She has had COVID-19 twice. She had her first bout of COVID-19 in 11/2019. She has had COVID-19 in the last year. She does wheeze and have dyspnea.    She is taking amitriptyline for her lower extremities. Reports her lower extremities are cold and have paresthesia. She has circulation problems. She had it looked at and they told her there was nothing wrong, but her feet should not be darker than her legs. This happened after she had COVID-19. She had an ultrasound of her lower extremities 6 months ago. The amitriptyline does not help. It is worse at night when she is trying to sleep. The amitriptyline helps with her sleep, but it does not do anything for the paresthesia in her lower extremities. She is not sure if she has had an EMG. The paresthesia does not occur constantly. Sitting in her chairs makes it worse. She gets cold and she has to have a little heater at her feet and that soothes it out a little bit.    She takes Claritin for allergies.    For hyperlipidemia she is currently taking Crestor.    She has sarcoidosis and does not see anyone for that.    She has pain in her knees. She has put on 50 pounds since she had COVID-19. She has a hump in her neck. She has not had it x-rayed and looked at. It has been ongoing for 6 to 8 months. It is getting bigger. She sits at a desk typing all day with her head  "down. She denies any family history of kyphosis. Her middle back bothers her. She takes Aleve for pain at night in her neck and back. She has not taken a muscle relaxer.     She had a urinary tract infection in 09/2023. She was taking Macrobid. It is better.    She takes Prilosec for GERD.    She was taking oxybutynin a couple of months ago. She is up 5 to 6 times a night. It did help, but it did dry her out. She also has a dry mouth and worsens with talking and is dry in the mornings.    She would like to know options for weight loss that is covered by her insurance. She is interested in surgical interventions as well.     She is up to date on her colon cancer screening. She is due for a mammogram and DEXA scan.  NEEDS DEXA AND MAMM    I have reviewed the following portions of the patient's history and confirmed they are accurate: allergies, current medications, past family history, past medical history, past social history, past surgical history, and problem list    Review of Systems  Pertinent items are noted in HPI.     Current Outpatient Medications on File Prior to Visit   Medication Sig    amLODIPine (NORVASC) 10 MG tablet Take 1 tablet by mouth Daily.    benzonatate (TESSALON) 200 MG capsule Take 1 capsule by mouth 3 (Three) Times a Day As Needed for Cough.    loratadine (Claritin) 10 MG tablet Take 1 tablet by mouth Daily.    losartan-hydrochlorothiazide (HYZAAR) 50-12.5 MG per tablet Take 1 tablet by mouth Daily.    omeprazole (priLOSEC) 40 MG capsule 1 capsule by mouth twice daily    polyethylene glycol (MIRALAX) packet Take 17 g by mouth Daily.    rosuvastatin (Crestor) 5 MG tablet Take 1 tablet by mouth Daily.     No current facility-administered medications on file prior to visit.       Objective   Vitals:    11/17/23 1418   BP: 122/80   BP Location: Left arm   Patient Position: Sitting   Cuff Size: Adult   Resp: 18   Weight: 102 kg (224 lb)   Height: 160 cm (63\")     Body mass index is 39.68 " kg/m².    Physical Exam  Vitals and nursing note reviewed.   Constitutional:       Appearance: She is well-developed.   HENT:      Head: Normocephalic and atraumatic.      Nose: Nose normal.   Eyes:      Conjunctiva/sclera: Conjunctivae normal.      Pupils: Pupils are equal, round, and reactive to light.   Cardiovascular:      Rate and Rhythm: Normal rate and regular rhythm.      Heart sounds: Normal heart sounds.   Pulmonary:      Effort: Pulmonary effort is normal.      Breath sounds: Normal breath sounds.   Abdominal:      General: Bowel sounds are normal.      Palpations: Abdomen is soft.   Musculoskeletal:      Cervical back: Normal range of motion and neck supple. Tenderness present. Pain with movement present. Decreased range of motion.      Comments: Cervical kyphosis.    Skin:     General: Skin is warm and dry.   Neurological:      Mental Status: She is alert and oriented to person, place, and time.   Psychiatric:         Behavior: Behavior normal.         Thought Content: Thought content normal.         Judgment: Judgment normal.         Assessment & Plan   Problem List Items Addressed This Visit       Hyperlipidemia    Hypertension - Primary    Primary insomnia    Neck pain    Relevant Orders    XR Spine Cervical Complete 4 or 5 View    Neuropathy    Relevant Medications    gabapentin (NEURONTIN) 300 MG capsule    amitriptyline (ELAVIL) 25 MG tablet    Kyphosis of cervical region    Relevant Orders    XR Spine Cervical Complete 4 or 5 View    COVID-19 long hauler    Relevant Medications    albuterol sulfate  (90 Base) MCG/ACT inhaler     Other Visit Diagnoses       Screening for blood disease        Relevant Orders    CBC (No Diff) (Completed)    Comprehensive Metabolic Panel (Completed)    Lipid Panel (Completed)    Hemoglobin A1c (Completed)    TSH Rfx On Abnormal To Free T4 (Completed)    Vitamin B12 & Folate (Completed)    Vitamin D,25-Hydroxy (Completed)    Thyroid disorder screening         Relevant Orders    TSH Rfx On Abnormal To Free T4 (Completed)    Lipid screening        Relevant Orders    Lipid Panel (Completed)    Encounter for vitamin deficiency screening        Relevant Orders    Vitamin B12 & Folate (Completed)    Vitamin D,25-Hydroxy (Completed)    Vitamin D deficiency        Relevant Orders    Vitamin D,25-Hydroxy (Completed)           1. Hypertension  - Chronic, stable.  - Continue losartan, hydrochlorothiazide, and amlodipine.  - Follow a heart-healthy, low-cholesterol diet.  - Completing fasting lipid panel today.    2. Hyperlipidemia  - Chronic, stable.  - Continue Crestor.  - Follow a heart-healthy, low-cholesterol, high-fiber diet.  - Checking fasting lipid panel and CMP.    3. Neuropathy  - Chronic, unstable.  - Start gabapentin.  - Continue amitriptyline at bedtime.  - Start Robaxin as needed.    4. Insomnia  - Chronic, stable.  - Continue amitriptyline.    5. Neck pain  - Chronic, unstable.  - Start Robaxin and gabapentin.  - Continue with NSAIDs and Tylenol as needed.  - X-ray ordered.    6. COVID-19 long hauler  - Chronic, stable.  - Continue albuterol as needed.        Current Outpatient Medications:     albuterol sulfate  (90 Base) MCG/ACT inhaler, Inhale 2 puffs Every 4 (Four) Hours As Needed for Wheezing or Shortness of Air., Disp: 18 g, Rfl: 1    amitriptyline (ELAVIL) 25 MG tablet, TAKE 1 TO 2 TABLETS BY MOUTH AT BEDTIME, Disp: 180 tablet, Rfl: 1    amLODIPine (NORVASC) 10 MG tablet, Take 1 tablet by mouth Daily., Disp: 90 tablet, Rfl: 3    benzonatate (TESSALON) 200 MG capsule, Take 1 capsule by mouth 3 (Three) Times a Day As Needed for Cough., Disp: 21 capsule, Rfl: 0    loratadine (Claritin) 10 MG tablet, Take 1 tablet by mouth Daily., Disp: 30 tablet, Rfl: 3    losartan-hydrochlorothiazide (HYZAAR) 50-12.5 MG per tablet, Take 1 tablet by mouth Daily., Disp: 90 tablet, Rfl: 3    omeprazole (priLOSEC) 40 MG capsule, 1 capsule by mouth twice daily, Disp: 180  capsule, Rfl: 3    polyethylene glycol (MIRALAX) packet, Take 17 g by mouth Daily., Disp: 30 each, Rfl: 3    rosuvastatin (Crestor) 5 MG tablet, Take 1 tablet by mouth Daily., Disp: 90 tablet, Rfl: 3    gabapentin (NEURONTIN) 300 MG capsule, Take 1 capsule by mouth At Night As Needed (leg pain)., Disp: 30 capsule, Rfl: 0    methocarbamol (ROBAXIN) 750 MG tablet, Take 1 tablet by mouth 4 (Four) Times a Day As Needed for Muscle Spasms., Disp: 60 tablet, Rfl: 1    ondansetron ODT (ZOFRAN-ODT) 8 MG disintegrating tablet, Take 1/2 to 1 tablet by mouth (dissolve under tongue or swallow) every 8 hours as needed for nausea., Disp: 30 tablet, Rfl: 1    oxybutynin XL (Ditropan XL) 10 MG 24 hr tablet, Take 1 tablet by mouth Daily., Disp: 30 tablet, Rfl: 1    Tirzepatide 2.5 MG/0.5ML solution pen-injector, Inject 0.5 mL under the skin into the appropriate area as directed Every 7 (Seven) Days., Disp: 2 mL, Rfl: 0       Plan of care reviewed with the patient at the conclusion of today's visit.  Education was provided regarding diagnosis, management, and any prescribed or recommended OTC medications.  Patient verbalized understanding of and agreement with management plan.     Return if symptoms worsen or fail to improve, for Annual, one week.      Transcribed from ambient dictation for LAUREN Norman by LAUREN Norman.  11/17/23   14:24 EST    Patient or patient representative verbalized consent to the visit recording.  I have personally performed the services described in this document as transcribed by the above individual, and it is both accurate and complete.    Transcribed from ambient dictation for LAUREN Norman by Renetta Quigley.  11/17/23   16:54 EST    Patient or patient representative verbalized consent to the visit recording.  I have personally performed the services described in this document as transcribed by the above individual, and it is both accurate and  complete.

## 2023-11-18 LAB
25(OH)D3 SERPL-MCNC: 34.6 NG/ML (ref 30–100)
ALBUMIN SERPL-MCNC: 4.3 G/DL (ref 3.5–5.2)
ALBUMIN/GLOB SERPL: 1.4 G/DL
ALP SERPL-CCNC: 140 U/L (ref 39–117)
ALT SERPL W P-5'-P-CCNC: 17 U/L (ref 1–33)
ANION GAP SERPL CALCULATED.3IONS-SCNC: 8.8 MMOL/L (ref 5–15)
AST SERPL-CCNC: 19 U/L (ref 1–32)
BILIRUB SERPL-MCNC: 0.2 MG/DL (ref 0–1.2)
BUN SERPL-MCNC: 14 MG/DL (ref 8–23)
BUN/CREAT SERPL: 16.5 (ref 7–25)
CALCIUM SPEC-SCNC: 9.4 MG/DL (ref 8.6–10.5)
CHLORIDE SERPL-SCNC: 102 MMOL/L (ref 98–107)
CHOLEST SERPL-MCNC: 173 MG/DL (ref 0–200)
CO2 SERPL-SCNC: 27.2 MMOL/L (ref 22–29)
CREAT SERPL-MCNC: 0.85 MG/DL (ref 0.57–1)
DEPRECATED RDW RBC AUTO: 42.7 FL (ref 37–54)
EGFRCR SERPLBLD CKD-EPI 2021: 76.1 ML/MIN/1.73
ERYTHROCYTE [DISTWIDTH] IN BLOOD BY AUTOMATED COUNT: 13.1 % (ref 12.3–15.4)
FOLATE SERPL-MCNC: 10 NG/ML (ref 4.78–24.2)
GLOBULIN UR ELPH-MCNC: 3 GM/DL
GLUCOSE SERPL-MCNC: 77 MG/DL (ref 65–99)
HBA1C MFR BLD: 6.4 % (ref 4.8–5.6)
HCT VFR BLD AUTO: 40 % (ref 34–46.6)
HDLC SERPL-MCNC: 54 MG/DL (ref 40–60)
HGB BLD-MCNC: 12.9 G/DL (ref 12–15.9)
LDLC SERPL CALC-MCNC: 100 MG/DL (ref 0–100)
LDLC/HDLC SERPL: 1.83 {RATIO}
MCH RBC QN AUTO: 28.5 PG (ref 26.6–33)
MCHC RBC AUTO-ENTMCNC: 32.3 G/DL (ref 31.5–35.7)
MCV RBC AUTO: 88.3 FL (ref 79–97)
PLATELET # BLD AUTO: 335 10*3/MM3 (ref 140–450)
PMV BLD AUTO: 10.6 FL (ref 6–12)
POTASSIUM SERPL-SCNC: 3.9 MMOL/L (ref 3.5–5.2)
PROT SERPL-MCNC: 7.3 G/DL (ref 6–8.5)
RBC # BLD AUTO: 4.53 10*6/MM3 (ref 3.77–5.28)
SODIUM SERPL-SCNC: 138 MMOL/L (ref 136–145)
TRIGL SERPL-MCNC: 102 MG/DL (ref 0–150)
TSH SERPL DL<=0.05 MIU/L-ACNC: 3.55 UIU/ML (ref 0.27–4.2)
VIT B12 BLD-MCNC: 863 PG/ML (ref 211–946)
VLDLC SERPL-MCNC: 19 MG/DL (ref 5–40)
WBC NRBC COR # BLD AUTO: 4.36 10*3/MM3 (ref 3.4–10.8)

## 2023-11-20 ENCOUNTER — OFFICE VISIT (OUTPATIENT)
Dept: INTERNAL MEDICINE | Facility: CLINIC | Age: 63
End: 2023-11-20
Payer: COMMERCIAL

## 2023-11-20 ENCOUNTER — PATIENT ROUNDING (BHMG ONLY) (OUTPATIENT)
Dept: INTERNAL MEDICINE | Facility: CLINIC | Age: 63
End: 2023-11-20
Payer: COMMERCIAL

## 2023-11-20 VITALS
WEIGHT: 224 LBS | HEIGHT: 63 IN | HEART RATE: 80 BPM | DIASTOLIC BLOOD PRESSURE: 80 MMHG | BODY MASS INDEX: 39.69 KG/M2 | SYSTOLIC BLOOD PRESSURE: 124 MMHG

## 2023-11-20 DIAGNOSIS — K22.2 ESOPHAGEAL STENOSIS: ICD-10-CM

## 2023-11-20 DIAGNOSIS — E11.9 TYPE 2 DIABETES MELLITUS WITHOUT COMPLICATION, WITH LONG-TERM CURRENT USE OF INSULIN: ICD-10-CM

## 2023-11-20 DIAGNOSIS — Z78.0 ENCOUNTER FOR OSTEOPOROSIS SCREENING IN ASYMPTOMATIC POSTMENOPAUSAL PATIENT: ICD-10-CM

## 2023-11-20 DIAGNOSIS — Z13.820 ENCOUNTER FOR OSTEOPOROSIS SCREENING IN ASYMPTOMATIC POSTMENOPAUSAL PATIENT: ICD-10-CM

## 2023-11-20 DIAGNOSIS — Z00.00 WELLNESS EXAMINATION: Primary | ICD-10-CM

## 2023-11-20 DIAGNOSIS — Z79.4 TYPE 2 DIABETES MELLITUS WITHOUT COMPLICATION, WITH LONG-TERM CURRENT USE OF INSULIN: ICD-10-CM

## 2023-11-20 DIAGNOSIS — N81.10 PROLAPSE OF FEMALE BLADDER, ACQUIRED: ICD-10-CM

## 2023-11-20 DIAGNOSIS — Z12.31 ENCOUNTER FOR SCREENING MAMMOGRAM FOR MALIGNANT NEOPLASM OF BREAST: ICD-10-CM

## 2023-11-20 RX ORDER — SEMAGLUTIDE 1.34 MG/ML
0.25 INJECTION, SOLUTION SUBCUTANEOUS WEEKLY
Qty: 1.5 ML | Refills: 1 | Status: SHIPPED | OUTPATIENT
Start: 2023-11-20 | End: 2023-12-03

## 2023-11-20 RX ORDER — ONDANSETRON 8 MG/1
TABLET, ORALLY DISINTEGRATING ORAL
Qty: 30 TABLET | Refills: 1 | Status: SHIPPED | OUTPATIENT
Start: 2023-11-20

## 2023-11-20 NOTE — PROGRESS NOTES
Subjective   Chief Complaint   Patient presents with    Annual Exam       Diane Manzanares is a 63 y.o. female here today for annual exam.    She is doing well and feels healthy overall. For exercise she walks around the office. She takes a daily multivitamin. She would like to lose weight and would like to try something that will help her.  She denies alcohol, tobacco, or drug use.     She had a total hysterectomy in 1996 or 1997.    She notes not too long ago she had an UTI.  She reports when she urinates and then wipes, she can feel her vagina coming out. When she strains to defecate, she can feel it as well.  She has been using oxybutynin and has been helping.    She does not have an antiemetic at home for nausea.     She also notes she has xerostomia, even when she is not taking the oxybutynin.     In the past she had an esophageal dilation procedure done.  She feels like she needs another one done.     She reports her father was diabetic.     She believes she is due for a mammogram and colonoscopy; last colonoscopy was 12/2022. She recalls only one polyp was found. A colonoscopy was done at the [facility]. She notes bowel movements are normal with MiraLAx. She is up to date with immunizations, eye, and dental.  She denies shingles vaccine. At age 63 she had pneumonia vaccine done. She wears a seatbelt and sunscreen.     She denies exposure to TB or recent travel out of the country.    Last visit she had DEXA scan order placed.       Overall healthy:  Yes  Regular exercise:  Yes  Diet is well balanced:  Yes  Vitamin Supplement:  Yes  Alcohol intake:  No  Tobacco use:  No  Cardiovascular risk is low:  Low  Menstrual cycle regular:  Hysterectomy, 1996  PAP:   hysterectomy  :  overactive bladder, feels she has bladder prolapse. See HPI  Mammogram:   normal, 2 years ago [facility]  Colonoscopy:   done 2022 at [facility], one polyp  GI:  Normal BM  Regular dental exam:  Up to date  Regular eye exam:   Up to date  Immunizations up to date:  Up to date  Wear a seatbelt regularly:  Yes  Wear sunscreen regularly when outdoors:  Yes    The 10-year ASCVD risk score (Jorden OLIVO, et al., 2019) is: 7.5%    Values used to calculate the score:      Age: 63 years      Sex: Female      Is Non- : Yes      Diabetic: No      Tobacco smoker: No      Systolic Blood Pressure: 124 mmHg      Is BP treated: Yes      HDL Cholesterol: 54 mg/dL      Total Cholesterol: 173 mg/dL      Past Medical History:   Diagnosis Date    Acid reflux 2016    Acute bronchitis 2016    Arthritis     Atopic dermatitis 2016    Bronchitis     Detrusor instability 2016    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    Insomnia     Menopausal symptoms 2016    Morbid obesity due to excess calories 2016    Overactive bladder 2016    Pain of both hip joints 2016    Rectal polyp 4/3/2020    Sarcoidosis of lung 2016    Sarcoidosis of skin 2016     Past Surgical History:   Procedure Laterality Date    HYSTERECTOMY      SHOULDER SURGERY      Right shoulder    TUBAL ABDOMINAL LIGATION       Family History   Problem Relation Age of Onset    Hyperlipidemia Mother     Osteoarthritis Mother     Hypertension Mother     Arthritis Father     Heart failure Father     Diabetes Father     Hypertension Father     Kidney disease Father     Stroke Father     Obesity Other      Social History     Tobacco Use   Smoking Status Former    Packs/day: 1.00    Years: 15.00    Additional pack years: 0.00    Total pack years: 15.00    Types: Cigarettes    Start date:     Quit date:     Years since quittin.9    Passive exposure: Past   Smokeless Tobacco Never      Social History     Substance and Sexual Activity   Alcohol Use Never      No Known Allergies    Review of Systems  Pertinent items are noted in HPI.     Current Outpatient Medications on File Prior to Visit   Medication Sig    albuterol sulfate  (90  "Base) MCG/ACT inhaler Inhale 2 puffs Every 4 (Four) Hours As Needed for Wheezing or Shortness of Air.    amitriptyline (ELAVIL) 25 MG tablet TAKE 1 TO 2 TABLETS BY MOUTH AT BEDTIME    amLODIPine (NORVASC) 10 MG tablet Take 1 tablet by mouth Daily.    benzonatate (TESSALON) 200 MG capsule Take 1 capsule by mouth 3 (Three) Times a Day As Needed for Cough.    gabapentin (NEURONTIN) 300 MG capsule Take 1 capsule by mouth At Night As Needed (leg pain).    loratadine (Claritin) 10 MG tablet Take 1 tablet by mouth Daily.    losartan-hydrochlorothiazide (HYZAAR) 50-12.5 MG per tablet Take 1 tablet by mouth Daily.    methocarbamol (ROBAXIN) 750 MG tablet Take 1 tablet by mouth 4 (Four) Times a Day As Needed for Muscle Spasms.    omeprazole (priLOSEC) 40 MG capsule 1 capsule by mouth twice daily    oxybutynin XL (Ditropan XL) 10 MG 24 hr tablet Take 1 tablet by mouth Daily.    polyethylene glycol (MIRALAX) packet Take 17 g by mouth Daily.    rosuvastatin (Crestor) 5 MG tablet Take 1 tablet by mouth Daily.     No current facility-administered medications on file prior to visit.       Objective   Vitals:    11/20/23 1348   BP: 124/80   BP Location: Left arm   Patient Position: Sitting   Pulse: 80   Weight: 102 kg (224 lb)   Height: 160 cm (62.99\")     Body mass index is 39.69 kg/m².    Physical Exam  Vitals reviewed.   Constitutional:       Appearance: Normal appearance. She is well-developed.   HENT:      Head: Normocephalic and atraumatic.      Right Ear: Hearing, tympanic membrane, ear canal and external ear normal.      Left Ear: Hearing, tympanic membrane, ear canal and external ear normal.      Nose: Nose normal.      Mouth/Throat:      Lips: Pink.      Mouth: Mucous membranes are moist.      Pharynx: Oropharynx is clear. Uvula midline.   Eyes:      General: Lids are normal.      Extraocular Movements: Extraocular movements intact.      Conjunctiva/sclera: Conjunctivae normal.      Pupils: Pupils are equal, round, and " reactive to light.   Neck:      Thyroid: No thyromegaly.      Trachea: Trachea normal.   Cardiovascular:      Rate and Rhythm: Normal rate and regular rhythm.      Pulses:           Carotid pulses are 2+ on the right side and 2+ on the left side.     Heart sounds: Normal heart sounds.   Pulmonary:      Effort: Pulmonary effort is normal. No respiratory distress.      Breath sounds: Normal breath sounds.   Abdominal:      General: Bowel sounds are normal.      Palpations: Abdomen is soft.      Tenderness: There is no abdominal tenderness.   Skin:     General: Skin is warm and dry.      Capillary Refill: Capillary refill takes 2 to 3 seconds.   Neurological:      Mental Status: She is alert and oriented to person, place, and time.      GCS: GCS eye subscore is 4. GCS verbal subscore is 5. GCS motor subscore is 6.   Psychiatric:         Attention and Perception: Attention normal.         Mood and Affect: Mood normal.         Speech: Speech normal.         Behavior: Behavior normal. Behavior is cooperative.         Thought Content: Thought content normal.                  Assessment & Plan     Current Outpatient Medications:     albuterol sulfate  (90 Base) MCG/ACT inhaler, Inhale 2 puffs Every 4 (Four) Hours As Needed for Wheezing or Shortness of Air., Disp: 18 g, Rfl: 1    amitriptyline (ELAVIL) 25 MG tablet, TAKE 1 TO 2 TABLETS BY MOUTH AT BEDTIME, Disp: 180 tablet, Rfl: 1    amLODIPine (NORVASC) 10 MG tablet, Take 1 tablet by mouth Daily., Disp: 90 tablet, Rfl: 3    benzonatate (TESSALON) 200 MG capsule, Take 1 capsule by mouth 3 (Three) Times a Day As Needed for Cough., Disp: 21 capsule, Rfl: 0    gabapentin (NEURONTIN) 300 MG capsule, Take 1 capsule by mouth At Night As Needed (leg pain)., Disp: 30 capsule, Rfl: 0    loratadine (Claritin) 10 MG tablet, Take 1 tablet by mouth Daily., Disp: 30 tablet, Rfl: 3    losartan-hydrochlorothiazide (HYZAAR) 50-12.5 MG per tablet, Take 1 tablet by mouth Daily., Disp:  90 tablet, Rfl: 3    methocarbamol (ROBAXIN) 750 MG tablet, Take 1 tablet by mouth 4 (Four) Times a Day As Needed for Muscle Spasms., Disp: 60 tablet, Rfl: 1    omeprazole (priLOSEC) 40 MG capsule, 1 capsule by mouth twice daily, Disp: 180 capsule, Rfl: 3    ondansetron ODT (ZOFRAN-ODT) 8 MG disintegrating tablet, Take 1/2 to 1 tablet by mouth (dissolve under tongue or swallow) every 8 hours as needed for nausea., Disp: 30 tablet, Rfl: 1    oxybutynin XL (Ditropan XL) 10 MG 24 hr tablet, Take 1 tablet by mouth Daily., Disp: 30 tablet, Rfl: 1    polyethylene glycol (MIRALAX) packet, Take 17 g by mouth Daily., Disp: 30 each, Rfl: 3    rosuvastatin (Crestor) 5 MG tablet, Take 1 tablet by mouth Daily., Disp: 90 tablet, Rfl: 3    Semaglutide,0.25 or 0.5MG/DOS, (Ozempic, 0.25 or 0.5 MG/DOSE,) 2 MG/1.5ML solution pen-injector, Inject 0.25 mg under the skin into the appropriate area as directed 1 (One) Time Per Week., Disp: 1.5 mL, Rfl: 1    Problem List Items Addressed This Visit    None  Visit Diagnoses       Wellness examination    -  Primary    Type 2 diabetes mellitus without complication, with long-term current use of insulin        Relevant Medications    Semaglutide,0.25 or 0.5MG/DOS, (Ozempic, 0.25 or 0.5 MG/DOSE,) 2 MG/1.5ML solution pen-injector    Esophageal stenosis        Relevant Orders    Ambulatory Referral to Gastroenterology (Completed)    Prolapse of female bladder, acquired        Relevant Orders    Ambulatory Referral to Urology (Completed)    Encounter for screening mammogram for malignant neoplasm of breast        Relevant Orders    Mammo Screening Digital Tomosynthesis Bilateral With CAD    Encounter for osteoporosis screening in asymptomatic postmenopausal patient        Relevant Orders    DEXA Bone Density Axial          1.  Wellness exam  - Patient will continue to eat a well-balanced diet, drink adequate amount of water, exercise at least 3 times weekly, and get adequate rest.  Suggested a  multivitamin daily.  Discussed future preventative screenings and immunizations.    2. Type 2 diabetes mellitus  - New, unstable.  - Start Ozempic.  - Follow a diabetic diet.   - Will recheck A1c in 3 months.  - Follow up in 1 month for recheck on Ozempic.     3. Esophageal stenosis  - Chronic, stable.   -Referring to gastroenterology.  - Continue Prilosec.     4. Prolapsed bladder  - New, stable.  - Continue oxybutynin.   - Referral to urology for consultation.           Counseling was given to patient for the following topics: appropriate exercise, healthy eating habits, disease prevention, risk factors for cancer, importance of self breast exam and breast health, importance of immunizations, including risks and benefits, bone health, sun safety, and seatbelt use.      Plan of care reviewed with the patient at the conclusion of today's visit.  Education was provided regarding diagnosis, management, and any prescribed or recommended OTC medications.  Patient verbalized understanding of and agreement with management plan.     Return in about 1 month (around 12/20/2023), or if symptoms worsen or fail to improve, for Follow-up.      Transcribed from ambient dictation for LAUREN Norman by Renetta Quigley.  11/20/23   16:30 EST    Patient or patient representative verbalized consent to the visit recording.  I have personally performed the services described in this document as transcribed by the above individual, and it is both accurate and complete.

## 2023-11-20 NOTE — PROGRESS NOTES
A  my chart message has been sent to the patient for patient [place] [facility] [place]

## 2023-11-21 ENCOUNTER — PRIOR AUTHORIZATION (OUTPATIENT)
Dept: INTERNAL MEDICINE | Facility: CLINIC | Age: 63
End: 2023-11-21
Payer: COMMERCIAL

## 2023-11-22 ENCOUNTER — TELEPHONE (OUTPATIENT)
Dept: INTERNAL MEDICINE | Facility: CLINIC | Age: 63
End: 2023-11-22
Payer: COMMERCIAL

## 2023-11-22 NOTE — TELEPHONE ENCOUNTER
Caller: Diane Manzanares    Relationship: Self    Best call back number: [phone]     Which medication are you concerned about: OZEMPIC    Who prescribed you this medication: BRAULIO FINLEY    When did you start taking this medication: NA    What are your concerns: PATIENT CALLED AND STATED HER INSURANCE WILL NOT COVER THE OZEMPIC. IS THERE ANOTHER MEDICATION SHE CAN TRY.     How long have you had these concerns:

## 2023-12-03 DIAGNOSIS — E11.9 TYPE 2 DIABETES MELLITUS WITHOUT COMPLICATION, WITH LONG-TERM CURRENT USE OF INSULIN: Primary | ICD-10-CM

## 2023-12-03 DIAGNOSIS — Z79.4 TYPE 2 DIABETES MELLITUS WITHOUT COMPLICATION, WITH LONG-TERM CURRENT USE OF INSULIN: Primary | ICD-10-CM

## 2023-12-03 DIAGNOSIS — E11.65 TYPE 2 DIABETES MELLITUS WITH HYPERGLYCEMIA, WITHOUT LONG-TERM CURRENT USE OF INSULIN: ICD-10-CM

## 2024-01-17 ENCOUNTER — HOSPITAL ENCOUNTER (OUTPATIENT)
Dept: MAMMOGRAPHY | Facility: HOSPITAL | Age: 64
Discharge: HOME | End: 2024-01-17
Payer: COMMERCIAL

## 2024-01-17 ENCOUNTER — APPOINTMENT (OUTPATIENT)
Dept: OTHER | Facility: HOSPITAL | Age: 64
End: 2024-01-17
Payer: COMMERCIAL

## 2024-01-17 ENCOUNTER — HOSPITAL ENCOUNTER (OUTPATIENT)
Dept: BONE DENSITY | Facility: HOSPITAL | Age: 64
Discharge: HOME | End: 2024-01-17
Payer: COMMERCIAL

## 2024-01-17 DIAGNOSIS — Z12.31 ENCOUNTER FOR SCREENING MAMMOGRAM FOR MALIGNANT NEOPLASM OF BREAST: ICD-10-CM

## 2024-01-17 DIAGNOSIS — Z13.820 ENCOUNTER FOR OSTEOPOROSIS SCREENING IN ASYMPTOMATIC POSTMENOPAUSAL PATIENT: ICD-10-CM

## 2024-01-17 DIAGNOSIS — Z78.0 ENCOUNTER FOR OSTEOPOROSIS SCREENING IN ASYMPTOMATIC POSTMENOPAUSAL PATIENT: ICD-10-CM

## 2024-01-17 PROCEDURE — 77063 BREAST TOMOSYNTHESIS BI: CPT

## 2024-01-17 PROCEDURE — 77067 SCR MAMMO BI INCL CAD: CPT

## 2024-01-17 PROCEDURE — 77080 DXA BONE DENSITY AXIAL: CPT

## 2024-02-06 ENCOUNTER — HOSPITAL ENCOUNTER (OUTPATIENT)
Dept: MAMMOGRAPHY | Facility: HOSPITAL | Age: 64
Discharge: HOME | End: 2024-02-06
Payer: COMMERCIAL

## 2024-02-22 ENCOUNTER — HOSPITAL ENCOUNTER (OUTPATIENT)
Dept: MAMMOGRAPHY | Facility: HOSPITAL | Age: 64
Discharge: HOME | End: 2024-02-22
Payer: COMMERCIAL

## 2024-02-22 ENCOUNTER — HOSPITAL ENCOUNTER (OUTPATIENT)
Dept: ULTRASOUND IMAGING | Facility: HOSPITAL | Age: 64
Discharge: HOME | End: 2024-02-22
Payer: COMMERCIAL

## 2024-02-22 DIAGNOSIS — R92.8 ABNORMAL MAMMOGRAM: ICD-10-CM

## 2024-02-22 PROCEDURE — 76642 ULTRASOUND BREAST LIMITED: CPT | Performed by: RADIOLOGY

## 2024-02-22 PROCEDURE — G0279 TOMOSYNTHESIS, MAMMO: HCPCS

## 2024-02-22 PROCEDURE — 76642 ULTRASOUND BREAST LIMITED: CPT

## 2024-02-22 PROCEDURE — 77065 DX MAMMO INCL CAD UNI: CPT | Performed by: RADIOLOGY

## 2024-02-22 PROCEDURE — 77065 DX MAMMO INCL CAD UNI: CPT

## 2024-02-22 PROCEDURE — 77061 BREAST TOMOSYNTHESIS UNI: CPT | Performed by: RADIOLOGY

## 2024-04-26 ENCOUNTER — OFFICE VISIT (OUTPATIENT)
Dept: INTERNAL MEDICINE | Facility: CLINIC | Age: 64
End: 2024-04-26
Payer: COMMERCIAL

## 2024-04-26 VITALS
RESPIRATION RATE: 16 BRPM | BODY MASS INDEX: 35.79 KG/M2 | WEIGHT: 202 LBS | DIASTOLIC BLOOD PRESSURE: 82 MMHG | SYSTOLIC BLOOD PRESSURE: 126 MMHG | TEMPERATURE: 98.1 F | OXYGEN SATURATION: 98 % | HEART RATE: 86 BPM | HEIGHT: 63 IN

## 2024-04-26 DIAGNOSIS — R10.13 EPIGASTRIC PAIN: Primary | ICD-10-CM

## 2024-04-26 DIAGNOSIS — K59.09 CHRONIC CONSTIPATION: ICD-10-CM

## 2024-04-26 PROCEDURE — 99213 OFFICE O/P EST LOW 20 MIN: CPT | Performed by: INTERNAL MEDICINE

## 2024-04-26 RX ORDER — SUCRALFATE 1 G/1
1 TABLET ORAL
Qty: 90 TABLET | Refills: 0 | Status: SHIPPED | OUTPATIENT
Start: 2024-04-26

## 2024-04-26 RX ORDER — AMOXICILLIN 250 MG
2 CAPSULE ORAL 2 TIMES DAILY PRN
Qty: 60 TABLET | Refills: 3 | Status: SHIPPED | OUTPATIENT
Start: 2024-04-26

## 2024-04-26 RX ORDER — LUBIPROSTONE 8 UG/1
8 CAPSULE ORAL 2 TIMES DAILY WITH MEALS
Qty: 30 CAPSULE | Refills: 3 | Status: SHIPPED | OUTPATIENT
Start: 2024-04-26

## 2024-04-26 NOTE — PROGRESS NOTES
Follow Up Office Visit      Date: 2024   Patient Name: Diane Manzanares  : 1960   MRN: [id number]     Chief Complaint:    Chief Complaint   Patient presents with    Abdominal Pain     Upper abd dull pain x 3 days    Heartburn    Constipation       History of Present Illness: Diane Manzanares is a 63 y.o. female who is here today to follow up with dull upper abd pain for 3 days.  Having worsening heart burn and constipation.  Last bm about 1 week.       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    Insomnia     Menopausal symptoms 2016    Morbid obesity due to excess calories 2016    Overactive bladder 2016    Pain of both hip joints 2016    Rectal polyp 4/3/2020    Sarcoidosis of lung 2016    Sarcoidosis of skin 2016       Past Surgical History:   Procedure Laterality Date    HYSTERECTOMY      OOPHORECTOMY      SHOULDER SURGERY      Right shoulder    TUBAL ABDOMINAL LIGATION         Family History   Problem Relation Age of Onset    Hyperlipidemia Mother     Osteoarthritis Mother     Hypertension Mother     Arthritis Father     Heart failure Father     Diabetes Father     Hypertension Father     Kidney disease Father     Stroke Father     Obesity Other     Breast cancer Neg Hx     Ovarian cancer Neg Hx         Social History     Socioeconomic History    Marital status:    Tobacco Use    Smoking status: Former     Current packs/day: 0.00     Average packs/day: 1 pack/day for 15.0 years (15.0 ttl pk-yrs)     Types: Cigarettes     Start date:      Quit date:      Years since quittin.3     Passive exposure: Past    Smokeless tobacco: Never   Vaping Use    Vaping status: Never Used   Substance and Sexual Activity    Alcohol use: Never    Drug use:  Never    Sexual activity: Defer         I have reviewed the patients family history, social history, past medical history, past surgical history and have updated it as appropriate.     Medications:     Current Outpatient Medications:     albuterol sulfate  (90 Base) MCG/ACT inhaler, Inhale 2 puffs Every 4 (Four) Hours As Needed for Wheezing or Shortness of Air., Disp: 18 g, Rfl: 1    amitriptyline (ELAVIL) 25 MG tablet, TAKE 1 TO 2 TABLETS BY MOUTH AT BEDTIME, Disp: 180 tablet, Rfl: 1    amLODIPine (NORVASC) 10 MG tablet, Take 1 tablet by mouth Daily., Disp: 90 tablet, Rfl: 3    loratadine (Claritin) 10 MG tablet, Take 1 tablet by mouth Daily., Disp: 30 tablet, Rfl: 3    losartan-hydrochlorothiazide (HYZAAR) 50-12.5 MG per tablet, Take 1 tablet by mouth Daily., Disp: 90 tablet, Rfl: 3    omeprazole (priLOSEC) 40 MG capsule, 1 capsule by mouth twice daily, Disp: 180 capsule, Rfl: 3    oxybutynin XL (Ditropan XL) 10 MG 24 hr tablet, Take 1 tablet by mouth Daily., Disp: 30 tablet, Rfl: 1    polyethylene glycol (MIRALAX) packet, Take 17 g by mouth Daily., Disp: 30 each, Rfl: 3    gabapentin (NEURONTIN) 300 MG capsule, Take 1 capsule by mouth At Night As Needed (leg pain). (Patient not taking: Reported on 4/26/2024), Disp: 30 capsule, Rfl: 0    lactulose (CEPHULAC) 20 g packet, Take 1 packet by mouth Daily As Needed (constipation)., Disp: 30 each, Rfl: 3    methocarbamol (ROBAXIN) 750 MG tablet, Take 1 tablet by mouth 4 (Four) Times a Day As Needed for Muscle Spasms. (Patient not taking: Reported on 4/26/2024), Disp: 60 tablet, Rfl: 1    ondansetron ODT (ZOFRAN-ODT) 8 MG disintegrating tablet, Take 1/2 to 1 tablet by mouth (dissolve under tongue or swallow) every 8 hours as needed for nausea. (Patient not taking: Reported on 4/26/2024), Disp: 30 tablet, Rfl: 1    rosuvastatin (Crestor) 5 MG tablet, Take 1 tablet by mouth Daily. (Patient not taking: Reported on 4/26/2024), Disp: 90 tablet, Rfl: 3     "sennosides-docusate (senna-docusate sodium) 8.6-50 MG per tablet, Take 2 tablets by mouth 2 (Two) Times a Day As Needed for Constipation., Disp: 60 tablet, Rfl: 3    sucralfate (Carafate) 1 g tablet, Take 1 tablet by mouth 3 (Three) Times a Day Before Meals., Disp: 90 tablet, Rfl: 0    Allergies:   No Known Allergies    Objective       Vital Signs:   Vitals:    04/26/24 1350   BP: 126/82   BP Location: Left arm   Patient Position: Sitting   Cuff Size: Adult   Pulse: 86   Resp: 16   Temp: 98.1 °F (36.7 °C)   TempSrc: Tympanic   SpO2: 98%   Weight: 91.6 kg (202 lb)   Height: 160 cm (63\")     Body mass index is 35.78 kg/m².    Physical Exam:  Physical Exam  Abdominal:      General: Abdomen is flat. Bowel sounds are normal. There is distension.      Palpations: Abdomen is soft.      Tenderness: There is no abdominal tenderness.         Procedures    Results:       Assessment / Plan      Assessment/Plan:   Diagnoses and all orders for this visit:    1. Epigastric pain (Primary)  -     sucralfate (Carafate) 1 g tablet; Take 1 tablet by mouth 3 (Three) Times a Day Before Meals.  Dispense: 90 tablet; Refill: 0    2. Chronic constipation  -     sennosides-docusate (senna-docusate sodium) 8.6-50 MG per tablet; Take 2 tablets by mouth 2 (Two) Times a Day As Needed for Constipation.  Dispense: 60 tablet; Refill: 3  -     lactulose (CEPHULAC) 20 g packet; Take 1 packet by mouth Daily As Needed (constipation).  Dispense: 30 each; Refill: 3                 Follow Up:   No follow-ups on file.    Gabino Kohlre DO    [facility]  "

## 2024-04-29 ENCOUNTER — TELEPHONE (OUTPATIENT)
Dept: INTERNAL MEDICINE | Facility: CLINIC | Age: 64
End: 2024-04-29
Payer: COMMERCIAL

## 2024-04-29 DIAGNOSIS — K59.09 CHRONIC CONSTIPATION: Primary | ICD-10-CM

## 2024-04-29 RX ORDER — LACTULOSE 10 G/15ML
20 SOLUTION ORAL 2 TIMES DAILY PRN
Qty: 237 ML | Refills: 3 | Status: SHIPPED | OUTPATIENT
Start: 2024-04-29

## 2024-05-03 DIAGNOSIS — M75.42 IMPINGEMENT SYNDROME OF LEFT SHOULDER: ICD-10-CM

## 2024-05-03 DIAGNOSIS — M75.52 BURSITIS OF LEFT SHOULDER: ICD-10-CM

## 2024-05-03 DIAGNOSIS — M25.512 LEFT SHOULDER PAIN, UNSPECIFIED CHRONICITY: ICD-10-CM

## 2024-05-03 DIAGNOSIS — I10 ESSENTIAL HYPERTENSION: ICD-10-CM

## 2024-05-03 DIAGNOSIS — R10.13 EPIGASTRIC PAIN: ICD-10-CM

## 2024-05-03 DIAGNOSIS — S46.012A TRAUMATIC INCOMPLETE TEAR OF LEFT ROTATOR CUFF, INITIAL ENCOUNTER: ICD-10-CM

## 2024-05-03 RX ORDER — SUCRALFATE 1 G/1
1 TABLET ORAL
Qty: 90 TABLET | Refills: 0 | Status: SHIPPED | OUTPATIENT
Start: 2024-05-03

## 2024-05-03 RX ORDER — LOSARTAN POTASSIUM AND HYDROCHLOROTHIAZIDE 12.5; 5 MG/1; MG/1
1 TABLET ORAL DAILY
Qty: 90 TABLET | Refills: 3 | OUTPATIENT
Start: 2024-05-03

## 2024-05-03 RX ORDER — MELOXICAM 7.5 MG/1
TABLET ORAL
Qty: 30 TABLET | Refills: 1 | OUTPATIENT
Start: 2024-05-03

## 2024-08-26 DIAGNOSIS — U09.9 COVID-19 LONG HAULER: ICD-10-CM

## 2024-08-27 RX ORDER — ALBUTEROL SULFATE 90 UG/1
2 AEROSOL, METERED RESPIRATORY (INHALATION) EVERY 4 HOURS PRN
Qty: 6.7 G | Refills: 2 | Status: SHIPPED | OUTPATIENT
Start: 2024-08-27

## 2024-09-29 ENCOUNTER — PATIENT ROUNDING (BHMG ONLY) (OUTPATIENT)
Dept: URGENT CARE | Facility: CLINIC | Age: 64
End: 2024-09-29
Payer: COMMERCIAL

## 2024-11-22 ENCOUNTER — OFFICE VISIT (OUTPATIENT)
Dept: INTERNAL MEDICINE | Facility: CLINIC | Age: 64
End: 2024-11-22
Payer: COMMERCIAL

## 2024-11-22 VITALS
SYSTOLIC BLOOD PRESSURE: 132 MMHG | HEART RATE: 96 BPM | DIASTOLIC BLOOD PRESSURE: 84 MMHG | OXYGEN SATURATION: 97 % | HEIGHT: 63 IN | WEIGHT: 193 LBS | TEMPERATURE: 97.2 F | BODY MASS INDEX: 34.2 KG/M2

## 2024-11-22 DIAGNOSIS — Z13.29 THYROID DISORDER SCREENING: ICD-10-CM

## 2024-11-22 DIAGNOSIS — E66.813 CLASS 3 SEVERE OBESITY WITH SERIOUS COMORBIDITY AND BODY MASS INDEX (BMI) OF 60.0 TO 69.9 IN ADULT, UNSPECIFIED OBESITY TYPE: ICD-10-CM

## 2024-11-22 DIAGNOSIS — N81.10 PROLAPSE OF FEMALE BLADDER, ACQUIRED: ICD-10-CM

## 2024-11-22 DIAGNOSIS — Z13.21 ENCOUNTER FOR VITAMIN DEFICIENCY SCREENING: ICD-10-CM

## 2024-11-22 DIAGNOSIS — Z13.1 SCREENING FOR DIABETES MELLITUS: ICD-10-CM

## 2024-11-22 DIAGNOSIS — E66.01 CLASS 3 SEVERE OBESITY WITH SERIOUS COMORBIDITY AND BODY MASS INDEX (BMI) OF 60.0 TO 69.9 IN ADULT, UNSPECIFIED OBESITY TYPE: ICD-10-CM

## 2024-11-22 DIAGNOSIS — Z00.00 WELLNESS EXAMINATION: Primary | ICD-10-CM

## 2024-11-22 DIAGNOSIS — E55.9 VITAMIN D DEFICIENCY: ICD-10-CM

## 2024-11-22 DIAGNOSIS — K58.1 IRRITABLE BOWEL SYNDROME WITH CONSTIPATION: ICD-10-CM

## 2024-11-22 DIAGNOSIS — Z13.0 SCREENING FOR BLOOD DISEASE: ICD-10-CM

## 2024-11-22 DIAGNOSIS — R32 URINARY INCONTINENCE, UNSPECIFIED TYPE: ICD-10-CM

## 2024-11-22 DIAGNOSIS — I10 ESSENTIAL HYPERTENSION: ICD-10-CM

## 2024-11-22 DIAGNOSIS — Z13.220 LIPID SCREENING: ICD-10-CM

## 2024-11-22 LAB
25(OH)D3 SERPL-MCNC: 30.2 NG/ML (ref 30–100)
ALBUMIN SERPL-MCNC: 4.2 G/DL (ref 3.5–5.2)
ALBUMIN/GLOB SERPL: 1.4 G/DL
ALP SERPL-CCNC: 119 U/L (ref 39–117)
ALT SERPL W P-5'-P-CCNC: 17 U/L (ref 1–33)
ANION GAP SERPL CALCULATED.3IONS-SCNC: 12.2 MMOL/L (ref 5–15)
AST SERPL-CCNC: 20 U/L (ref 1–32)
BILIRUB SERPL-MCNC: 0.3 MG/DL (ref 0–1.2)
BUN SERPL-MCNC: 12 MG/DL (ref 8–23)
BUN/CREAT SERPL: 14 (ref 7–25)
CALCIUM SPEC-SCNC: 9.3 MG/DL (ref 8.6–10.5)
CHLORIDE SERPL-SCNC: 97 MMOL/L (ref 98–107)
CHOLEST SERPL-MCNC: 207 MG/DL (ref 0–200)
CO2 SERPL-SCNC: 26.8 MMOL/L (ref 22–29)
CREAT SERPL-MCNC: 0.86 MG/DL (ref 0.57–1)
DEPRECATED RDW RBC AUTO: 42.6 FL (ref 37–54)
EGFRCR SERPLBLD CKD-EPI 2021: 74.6 ML/MIN/1.73
ERYTHROCYTE [DISTWIDTH] IN BLOOD BY AUTOMATED COUNT: 13.4 % (ref 12.3–15.4)
FOLATE SERPL-MCNC: 6.94 NG/ML (ref 4.78–24.2)
GLOBULIN UR ELPH-MCNC: 2.9 GM/DL
GLUCOSE SERPL-MCNC: 74 MG/DL (ref 65–99)
HBA1C MFR BLD: 5.5 % (ref 4.8–5.6)
HCT VFR BLD AUTO: 40.5 % (ref 34–46.6)
HDLC SERPL-MCNC: 50 MG/DL (ref 40–60)
HGB BLD-MCNC: 13.5 G/DL (ref 12–15.9)
LDLC SERPL CALC-MCNC: 131 MG/DL (ref 0–100)
LDLC/HDLC SERPL: 2.56 {RATIO}
MCH RBC QN AUTO: 29.2 PG (ref 26.6–33)
MCHC RBC AUTO-ENTMCNC: 33.3 G/DL (ref 31.5–35.7)
MCV RBC AUTO: 87.5 FL (ref 79–97)
PLATELET # BLD AUTO: 319 10*3/MM3 (ref 140–450)
PMV BLD AUTO: 9.9 FL (ref 6–12)
POTASSIUM SERPL-SCNC: 3.6 MMOL/L (ref 3.5–5.2)
PROT SERPL-MCNC: 7.1 G/DL (ref 6–8.5)
RBC # BLD AUTO: 4.63 10*6/MM3 (ref 3.77–5.28)
SODIUM SERPL-SCNC: 136 MMOL/L (ref 136–145)
TRIGL SERPL-MCNC: 144 MG/DL (ref 0–150)
TSH SERPL DL<=0.05 MIU/L-ACNC: 2.71 UIU/ML (ref 0.27–4.2)
VIT B12 BLD-MCNC: 715 PG/ML (ref 211–946)
VLDLC SERPL-MCNC: 26 MG/DL (ref 5–40)
WBC NRBC COR # BLD AUTO: 3.47 10*3/MM3 (ref 3.4–10.8)

## 2024-11-22 PROCEDURE — 80050 GENERAL HEALTH PANEL: CPT | Performed by: NURSE PRACTITIONER

## 2024-11-22 PROCEDURE — 80061 LIPID PANEL: CPT | Performed by: NURSE PRACTITIONER

## 2024-11-22 PROCEDURE — 82306 VITAMIN D 25 HYDROXY: CPT | Performed by: NURSE PRACTITIONER

## 2024-11-22 PROCEDURE — 83036 HEMOGLOBIN GLYCOSYLATED A1C: CPT | Performed by: NURSE PRACTITIONER

## 2024-11-22 PROCEDURE — 82746 ASSAY OF FOLIC ACID SERUM: CPT | Performed by: NURSE PRACTITIONER

## 2024-11-22 PROCEDURE — 82607 VITAMIN B-12: CPT | Performed by: NURSE PRACTITIONER

## 2024-11-22 RX ORDER — LOSARTAN POTASSIUM AND HYDROCHLOROTHIAZIDE 12.5; 5 MG/1; MG/1
1 TABLET ORAL DAILY
Qty: 90 TABLET | Refills: 3 | Status: SHIPPED | OUTPATIENT
Start: 2024-11-22

## 2024-11-22 RX ORDER — OXYBUTYNIN CHLORIDE 15 MG/1
15 TABLET, EXTENDED RELEASE ORAL DAILY
Qty: 90 TABLET | Refills: 1 | Status: SHIPPED | OUTPATIENT
Start: 2024-11-22

## 2024-11-22 RX ORDER — AMLODIPINE BESYLATE 10 MG/1
10 TABLET ORAL DAILY
Qty: 90 TABLET | Refills: 3 | Status: SHIPPED | OUTPATIENT
Start: 2024-11-22

## 2024-11-22 RX ORDER — PHENTERMINE HYDROCHLORIDE 37.5 MG/1
37.5 TABLET ORAL
Qty: 30 TABLET | Refills: 0 | Status: SHIPPED | OUTPATIENT
Start: 2024-11-22

## 2024-11-22 NOTE — PROGRESS NOTES
Subjective   Chief Complaint   Patient presents with    Annual Exam       Diane Manzanares is a 64 y.o. female here today for annual exam.    History of Present Illness    Overall healthy:  Yes  Regular exercise:  Physically active  Diet is well balanced:  Yes  Vitamin Supplement:  No  Alcohol intake:  No  Tobacco use:  No  Cardiovascular risk is low:  Low  Menstrual cycle regular:  Hysterectomy, 1996  PAP:   hysterectomy  :  overactive bladder, feels she has bladder prolapse.   Mammogram:  up to date  Colonoscopy:   done 2022 at [facility], one polyp, return in 2027.   GI:  constipation. Taking OTC colace.   Regular dental exam:  Up to date  Regular eye exam:  Up to date  Immunizations up to date:  Up to date  Wear a seatbelt regularly:  Yes  Wear sunscreen regularly when outdoors:  Yes      The patient presents for a physical exam.    She reports overall good health, maintaining regular exercise and a balanced diet. She does not take any vitamins. She is up-to-date with her dental and eye exams and does not receive any immunizations. Her hearing remains unchanged.    She has a history of hysterectomy in 1996. She experienced an overactive bladder last year and suspected a bladder prolapse but was unable to secure an appointment with gynecology until February 2024. She continues to experience overactive bladder symptoms, including frequent urination at night, which disrupts her sleep. Despite taking oxybutynin, she reports no improvement and wishes to increase the dosage. She also reports a sensation of a bulge when wiping after urination, which she believes is her bladder. This has been ongoing for a year.    She has tried Contrave for weight loss without success and fasts from 8:30 am to 1:00 pm daily. She has increased her physical activity and unable to lose weight. BP and HR are stable and at goal. Denies any palpitations or insomnia.     She had a mammogram in February 2024 and a colonoscopy in 2022,  during which one polyp was found. She is due for another colonoscopy in 2027.    She has been experiencing constipation for the past 5 to 6 years, despite taking Colace. She has tried Linzess in the past, but it is no longer covered by her insurance. She has also tried lactulose and Amitiza for constipation, but found them ineffective.    She takes amlodipine and losartan for blood pressure and amitriptyline for leg numbness. She occasionally uses an albuterol inhaler for bronchitis. She is not experiencing shortness of breath, chest pain, or difficulty swallowing. She does not report any weakness on one side of the body. She experiences occasional leg swelling and has varicose veins, which she manages. She does not report any concerning skin lesions.    SOCIAL HISTORY  She does not drink alcohol or smoke.    IMMUNIZATIONS  She did get the initial COVID-19 vaccine.      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 9/2/2016    Insomnia     Menopausal symptoms 9/2/2016    Morbid obesity due to excess calories 8/8/2016    Overactive bladder 9/2/2016    Pain of both hip joints 8/8/2016    Rectal polyp 4/3/2020    Sarcoidosis of lung 9/2/2016    Sarcoidosis of skin 9/2/2016     Past Surgical History:   Procedure Laterality Date    HYSTERECTOMY      OOPHORECTOMY      SHOULDER SURGERY      Right shoulder    TUBAL ABDOMINAL LIGATION       Family History   Problem Relation Age of Onset    Hyperlipidemia  "Mother     Osteoarthritis Mother     Hypertension Mother     Arthritis Father     Heart failure Father     Diabetes Father     Hypertension Father     Kidney disease Father     Stroke Father     Obesity Other     Breast cancer Neg Hx     Ovarian cancer Neg Hx      Social History     Tobacco Use   Smoking Status Former    Current packs/day: 0.00    Average packs/day: 1 pack/day for 15.0 years (15.0 ttl pk-yrs)    Types: Cigarettes    Start date:     Quit date:     Years since quittin.9    Passive exposure: Past   Smokeless Tobacco Never      Social History     Substance and Sexual Activity   Alcohol Use Never      No Known Allergies    Review of Systems  Pertinent items are noted in HPI.     Current Outpatient Medications on File Prior to Visit   Medication Sig    albuterol sulfate  (90 Base) MCG/ACT inhaler INHALE 2 PUFFS EVERY 4 HOURS AS NEEDED FOR WHEEZING OR SHORTNESS OF AIR    clobetasol propionate (TEMOVATE) 0.05 % cream Apply to affected areas in thin layer BID    loratadine (Claritin) 10 MG tablet Take 1 tablet by mouth Daily.    gabapentin (NEURONTIN) 300 MG capsule Take 1 capsule by mouth At Night As Needed (leg pain). (Patient not taking: Reported on 2024)    ondansetron ODT (ZOFRAN-ODT) 8 MG disintegrating tablet Take 1/2 to 1 tablet by mouth (dissolve under tongue or swallow) every 8 hours as needed for nausea. (Patient not taking: Reported on 2024)    rosuvastatin (Crestor) 5 MG tablet Take 1 tablet by mouth Daily. (Patient not taking: Reported on 2024)     No current facility-administered medications on file prior to visit.       Objective   Vitals:    24 1416   BP: 132/84   BP Location: Left arm   Patient Position: Sitting   Cuff Size: Large Adult   Pulse: 96   Temp: 97.2 °F (36.2 °C)   SpO2: 97%   Weight: 87.5 kg (193 lb)   Height: 160 cm (62.99\")     Body mass index is 34.2 kg/m².    Physical Exam  Vitals reviewed.   Constitutional:       Appearance: Normal " appearance. She is well-developed.   HENT:      Head: Normocephalic and atraumatic.      Right Ear: Hearing, tympanic membrane, ear canal and external ear normal.      Left Ear: Hearing, tympanic membrane, ear canal and external ear normal.      Nose: Nose normal.      Mouth/Throat:      Lips: Pink.      Mouth: Mucous membranes are moist.      Pharynx: Oropharynx is clear. Uvula midline.   Eyes:      General: Lids are normal.      Extraocular Movements: Extraocular movements intact.      Conjunctiva/sclera: Conjunctivae normal.      Pupils: Pupils are equal, round, and reactive to light.   Neck:      Thyroid: No thyromegaly.      Trachea: Trachea normal.   Cardiovascular:      Rate and Rhythm: Normal rate and regular rhythm.      Pulses:           Carotid pulses are 2+ on the right side and 2+ on the left side.     Heart sounds: Normal heart sounds.   Pulmonary:      Effort: Pulmonary effort is normal. No respiratory distress.      Breath sounds: Normal breath sounds.   Abdominal:      General: Bowel sounds are normal.      Palpations: Abdomen is soft.      Tenderness: There is no abdominal tenderness.   Skin:     General: Skin is warm and dry.      Capillary Refill: Capillary refill takes 2 to 3 seconds.   Neurological:      Mental Status: She is alert and oriented to person, place, and time.      GCS: GCS eye subscore is 4. GCS verbal subscore is 5. GCS motor subscore is 6.   Psychiatric:         Attention and Perception: Attention normal.         Mood and Affect: Mood normal.         Speech: Speech normal.         Behavior: Behavior normal. Behavior is cooperative.         Thought Content: Thought content normal.         Results  Laboratory Studies  A1c was at 6.4.    BMI is >= 30 and <35. (Class 1 Obesity). The following options were offered after discussion;: exercise counseling/recommendations, nutrition counseling/recommendations, and pharmacological intervention options        Assessment & Plan     Current  Outpatient Medications:     albuterol sulfate  (90 Base) MCG/ACT inhaler, INHALE 2 PUFFS EVERY 4 HOURS AS NEEDED FOR WHEEZING OR SHORTNESS OF AIR, Disp: 6.7 g, Rfl: 2    amLODIPine (NORVASC) 10 MG tablet, Take 1 tablet by mouth Daily., Disp: 90 tablet, Rfl: 3    clobetasol propionate (TEMOVATE) 0.05 % cream, Apply to affected areas in thin layer BID, Disp: 30 g, Rfl: 0    loratadine (Claritin) 10 MG tablet, Take 1 tablet by mouth Daily., Disp: 30 tablet, Rfl: 3    losartan-hydrochlorothiazide (HYZAAR) 50-12.5 MG per tablet, Take 1 tablet by mouth Daily., Disp: 90 tablet, Rfl: 3    gabapentin (NEURONTIN) 300 MG capsule, Take 1 capsule by mouth At Night As Needed (leg pain). (Patient not taking: Reported on 4/26/2024), Disp: 30 capsule, Rfl: 0    linaclotide (Linzess) 290 MCG capsule capsule, Take 1 capsule by mouth Every Morning Before Breakfast., Disp: 30 capsule, Rfl: 5    ondansetron ODT (ZOFRAN-ODT) 8 MG disintegrating tablet, Take 1/2 to 1 tablet by mouth (dissolve under tongue or swallow) every 8 hours as needed for nausea. (Patient not taking: Reported on 4/26/2024), Disp: 30 tablet, Rfl: 1    oxybutynin XL (DITROPAN XL) 15 MG 24 hr tablet, Take 1 tablet by mouth Daily., Disp: 90 tablet, Rfl: 1    phentermine (Adipex-P) 37.5 MG tablet, Take 1 tablet by mouth Every Morning Before Breakfast., Disp: 30 tablet, Rfl: 0    rosuvastatin (Crestor) 5 MG tablet, Take 1 tablet by mouth Daily. (Patient not taking: Reported on 4/26/2024), Disp: 90 tablet, Rfl: 3    Problem List Items Addressed This Visit    None  Visit Diagnoses       Wellness examination    -  Primary    Relevant Orders    CBC (No Diff) (Completed)    Comprehensive Metabolic Panel (Completed)    Lipid Panel (Completed)    Hemoglobin A1c (Completed)    TSH Rfx On Abnormal To Free T4 (Completed)    Vitamin B12 & Folate (Completed)    Vitamin D,25-Hydroxy (Completed)    Essential hypertension        Relevant Medications    amLODIPine (NORVASC) 10 MG  tablet    losartan-hydrochlorothiazide (HYZAAR) 50-12.5 MG per tablet    Prolapse of female bladder, acquired        Relevant Medications    oxybutynin XL (DITROPAN XL) 15 MG 24 hr tablet    Other Relevant Orders    Ambulatory Referral to Urology (Completed)    Class 3 severe obesity with serious comorbidity and body mass index (BMI) of 60.0 to 69.9 in adult, unspecified obesity type        Relevant Medications    phentermine (Adipex-P) 37.5 MG tablet    Irritable bowel syndrome with constipation        Relevant Medications    linaclotide (Linzess) 290 MCG capsule capsule    Screening for blood disease        Relevant Orders    CBC (No Diff) (Completed)    Comprehensive Metabolic Panel (Completed)    Lipid Panel (Completed)    Hemoglobin A1c (Completed)    TSH Rfx On Abnormal To Free T4 (Completed)    Vitamin B12 & Folate (Completed)    Vitamin D,25-Hydroxy (Completed)    Lipid screening        Relevant Orders    Lipid Panel (Completed)    Screening for diabetes mellitus        Relevant Orders    Hemoglobin A1c (Completed)    Thyroid disorder screening        Relevant Orders    TSH Rfx On Abnormal To Free T4 (Completed)    Encounter for vitamin deficiency screening        Relevant Orders    Vitamin B12 & Folate (Completed)    Vitamin D,25-Hydroxy (Completed)    Vitamin D deficiency        Relevant Orders    Vitamin D,25-Hydroxy (Completed)    Urinary incontinence, unspecified type        Relevant Orders    Ambulatory Referral to Urology (Completed)            Assessment & Plan  Wellness exam  Patient will continue to eat a well-balanced diet, drink adequate amount of water, exercise at least 3 times weekly, and get adequate rest.  Suggested a multivitamin daily.  Discussed future preventative screenings and immunizations.    Increase oxybutynin to 15mg daily. Referring to urology for consult. Continue amlodipine, losartan, and HCTZ. Follow low cholesterol and high fiber diet. Checking lipid panel and CMP. Follow low  carb and low sugar diet. Checking A1c. Start adipex to help with appetite suppression and weight loss. Follow up in one month. Start linzess constipation. Increase water intake.     Patient was educated on side effects of taking Phentermine medications including risk of addiction, increased heart rate, blood pressure, anxiety, insomnia, and dry mouth. Patient verbalized understanding and wants to continue with this medication. Patient will stop medication and schedule earlier follow up if these symptoms occur. Patient will increase water intake, follow a well balanced diet low in cholesterol/carbs/sugars, decrease portion sizes, and increase physical activity. MYLA reviewed and appropriate.         Follow-up  Return in 1 month for follow-up.           Counseling was given to patient for the following topics: appropriate exercise, healthy eating habits, disease prevention, risk factors for cancer, importance of self breast exam and breast health, importance of immunizations, including risks and benefits, bone health, sun safety, and seatbelt use.      Plan of care reviewed with the patient at the conclusion of today's visit.  Education was provided regarding diagnosis, management, and any prescribed or recommended OTC medications.  Patient verbalized understanding of and agreement with management plan.     Return in about 1 month (around 12/22/2024), or if symptoms worsen or fail to improve, for Follow-up.      Transcribed from ambient dictation for LAUREN Norman by LAUREN Norman.  11/22/24   14:42 EST    Patient or patient representative verbalized consent for the use of Ambient Listening during the visit with  LAUREN Norman for chart documentation. 12/1/2024  01:35 EST

## 2024-12-07 ENCOUNTER — TELEPHONE (OUTPATIENT)
Dept: INTERNAL MEDICINE | Facility: CLINIC | Age: 64
End: 2024-12-07
Payer: COMMERCIAL

## 2024-12-07 NOTE — TELEPHONE ENCOUNTER
" urology stated \"Our office does not see for prolapse, and our first available for urinary incontinence with a Nurse Practitioner would not be until Feb. Recommend PT keep her existing appointment as they can address both issues \" I sent the pt to  urology for the prolapse.  "

## 2024-12-27 ENCOUNTER — TELEPHONE (OUTPATIENT)
Dept: INTERNAL MEDICINE | Facility: CLINIC | Age: 64
End: 2024-12-27
Payer: COMMERCIAL

## 2024-12-27 NOTE — TELEPHONE ENCOUNTER
Caller: Diane Manzanares    Relationship: Self    Best call back number: [phone]    What is the best time to reach you: ANYTIME     Who are you requesting to speak with (clinical staff, provider,  specific staff member): CLINCIAL STAFF     AMITRIPTYLINE 25 MG  WANTED TO LET PROVIDER KNOW OF THIS.

## 2024-12-27 NOTE — TELEPHONE ENCOUNTER
Hub to relay    Attempted to reach patient no answer. Please let patient know that Radha Is on vacation and will not return until January 7, 2024. Can you ask patient what she wanted to tell Radha about her amitriptyline 25 mg?

## 2025-02-07 ENCOUNTER — TRANSCRIBE ORDERS (OUTPATIENT)
Dept: PHYSICAL THERAPY | Facility: CLINIC | Age: 65
End: 2025-02-07
Payer: COMMERCIAL

## 2025-02-07 DIAGNOSIS — M25.572 LEFT ANKLE PAIN, UNSPECIFIED CHRONICITY: ICD-10-CM

## 2025-02-07 DIAGNOSIS — S39.012A STRAIN OF LUMBAR REGION, INITIAL ENCOUNTER: Primary | ICD-10-CM

## 2025-02-17 ENCOUNTER — TELEPHONE (OUTPATIENT)
Dept: INTERNAL MEDICINE | Facility: CLINIC | Age: 65
End: 2025-02-17

## 2025-02-17 NOTE — TELEPHONE ENCOUNTER
Caller: Diane Manzanares    Relationship: Self    Best call back number: [phone]    What was the call regarding: PT WANTS LAST SET OF LABS MAILED TO HER HOME ADDRESS ON FILE. IT WAS CONFIRMED OVER PHONE BY ME TODAY. PLEASE CALL IF THERE ARE ANY QUESTIONS    Is it okay if the provider responds through MyChart: NO

## 2025-03-17 ENCOUNTER — TRANSCRIBE ORDERS (OUTPATIENT)
Dept: ADMINISTRATIVE | Facility: HOSPITAL | Age: 65
End: 2025-03-17
Payer: COMMERCIAL

## 2025-03-17 DIAGNOSIS — S39.012A LUMBAR STRAIN, INITIAL ENCOUNTER: Primary | ICD-10-CM

## 2025-04-22 ENCOUNTER — HOSPITAL ENCOUNTER (OUTPATIENT)
Dept: MRI IMAGING | Facility: HOSPITAL | Age: 65
Discharge: HOME | End: 2025-04-22
Admitting: PHYSICAL MEDICINE & REHABILITATION
Payer: OTHER MISCELLANEOUS

## 2025-04-22 DIAGNOSIS — S39.012A LUMBAR STRAIN, INITIAL ENCOUNTER: ICD-10-CM

## 2025-04-22 PROCEDURE — 72148 MRI LUMBAR SPINE W/O DYE: CPT

## 2025-06-16 ENCOUNTER — OFFICE VISIT (OUTPATIENT)
Dept: ORTHOPEDIC SURGERY | Facility: CLINIC | Age: 65
End: 2025-06-16
Payer: OTHER MISCELLANEOUS

## 2025-06-16 VITALS
WEIGHT: 184 LBS | BODY MASS INDEX: 32.6 KG/M2 | SYSTOLIC BLOOD PRESSURE: 130 MMHG | HEIGHT: 63 IN | DIASTOLIC BLOOD PRESSURE: 80 MMHG

## 2025-06-16 DIAGNOSIS — M25.572 LEFT ANKLE PAIN, UNSPECIFIED CHRONICITY: Primary | ICD-10-CM

## 2025-06-16 RX ORDER — MIRABEGRON 25 MG/1
TABLET, FILM COATED, EXTENDED RELEASE ORAL
COMMUNITY
Start: 2025-06-10

## 2025-06-16 NOTE — TELEPHONE ENCOUNTER
ASSESSMENT:  Jenn Barclay is a 68 year old female presents for consultation at the request of Jorge Dunn who presents today for new patient evaluation of :         Diagnoses and all orders for this visit:  Primary osteoarthritis of right knee  -     PAIN Joint Injection Major Joint Right; Future  Spinal stenosis of lumbar region, unspecified whether neurogenic claudication present  -     Spine  Referral  -     MR Lumbar Spine w/o Contrast; Future  -     XR Lumbar Spine G/E 4 Views; Future  Pain of right lower leg  -     Spine  Referral  -     MR Lumbar Spine w/o Contrast; Future  -     PAIN Joint Injection Major Joint Right; Future  Lumbar radiculopathy  -     MR Lumbar Spine w/o Contrast; Future  -     XR Lumbar Spine G/E 4 Views; Future       Patient is neurologically intact on exam. No myelopathic or red flag symptoms.    Patient is a 68-year-old female presenting for evaluation of chronic low back pain with right-sided lumbar radiculopathy as well as right knee pain and swelling.  On exam she has positive neural tension testing on the right lower extremity as well as a palpable Baker's cyst and swelling of the right knee.  We reviewed her available imaging including CT lumbar spine and knee x-ray, with the lumbar spine CT showing mild to moderate stenosis at L4-L5 and these knee x-ray showing chondrocalcinosis, patellofemoral arthritis, and joint effusion.  We discussed moving forward with an MRI lumbar spine for better evaluation of neural elements and a steroid injection for the right knee to help treat the right knee pain.    PLAN:  Reviewed spine anatomy and disease process. Discussed diagnosis and treatment options with the patient today. A shared decision making model was used. The patient's values and choices were respected. The following represents what was discussed and decided upon by the provider and the patient.    1. DIAGNOSTIC TESTS  MRI of lumbar spine was ordered for  Rewrote xoponex ordered so it is to be administered 3 times daily.    Please clarify when she would like to return to work at home and if she wants to work full time or start with some restrictions- 1/2 day or 3 days a week, etc.   further characterization of soft tissues and/or neural compression    2. PHYSICAL THERAPY  Patient is already performing a home program, and was encouraged to continue doing so.  Patient reports recently completing PT without significant benefit  Discussed the importance of core strengthening, ROM, stretching exercises with the patient and how each of these entities is important in decreasing pain.  Explained to the patient that the purpose of physical therapy is to teach the patient a home exercise program.  These exercises need to be performed every day in order to decrease pain and prevent future occurrences of pain.  Likened it to brushing one's teeth.      3. MEDICATIONS:  Discussed multiple medication options today with patient. Discussed risks, side effects, and proper use of medications. Patient verbalized understanding.  No new medications ordered at this visit.    4. INTERVENTIONS:  Right knee (intra-articular) steroid injection with fluoroscopic guidance.  Will attempt to aspirate/drain joint as well    5. OTHER REFERRALS:  No other referrals at this time.    6. FOLLOW-UP  In approximately 2-4 weeks to assess response to injection.  Patient advised to contact our office for earlier appointment if symptoms worsening or not improving, or any side effects are noticed.    Advised patient to call the [facility] if symptoms worsen or you have problems controlling bladder and bowel function.   ______________________________________________________________________    SUBJECTIVE:   Jenn Barclay  is a 68 year old female who presents today for new patient evaluation.    Patient reports she is having chronic low back pain for 15 years but in the past year the pain is started to radiate down the back of the right leg.  Typically goes down to the buttock and posterior hamstring area, and she has not noticed any significant pain traveling below that.  Pain worsens with any prolonged sitting or standing, and bending  forward is worse than bending backward.  Changing position is helpful as is the pain medication that she takes.  No prior back surgeries.    Separately of this, patient notes that she has been noticing intermittent swelling of the right knee and the right ankle particularly with more walking or towards the end of the day.  Swelling of the knee is located circumferentially around the knee, with a palpable bump or mass in the posterior knee area at times.  She denies any associated warmth, redness, or loss of movement in either the knee or the ankle.  Patient reports she has been told at various times that this is venous edema, gout, or arthritis, and she is not sure what is actually causing her symptoms.  No history of knee or ankle surgery, but did have a right hip fracture in March which is now status post hip replacement.    -Treatment to Date: PT, medication      Oswestry (SHANTE) Questionnaire        7/26/2024     9:42 AM   OSWESTRY DISABILITY INDEX   Count 5   Sum 4   Oswestry Score (%) 16 %        Data saved with a previous flowsheet row definition       Neck Disability Index:      7/26/2024     9:42 AM   Neck Disability Index (  [employer]. 1991. All rights reserved.; used with permission)   SECTION 1 - PAIN INTENSITY 1    SECTION 3 - LIFTING 0    SECTION 5 - HEADACHES 1    SECTION 7 - WORK 1    SECTION 9 - SLEEPING 0    Count 5    Sum 3        Data saved with a previous flowsheet row definition              -Medications:    Current Outpatient Medications   Medication Sig Dispense Refill    acetaminophen (TYLENOL) 325 MG tablet Take 325 mg by mouth daily as needed for mild pain or pain.      albuterol (PROVENTIL) (2.5 MG/3ML) 0.083% neb solution USE 1 VIAL IN NEBULIZER EVERY 6 HOURS AS NEEDED FOR WHEEZING 270 mL 3    ascorbic acid (VITAMIN C) 500 MG tablet Take 500 mg by mouth daily.      aspirin (ASA) 325 MG EC tablet Take 325 mg by mouth daily as needed (headache).      augmented betamethasone  dipropionate (DIPROLENE AF) 0.05 % external cream Apply topically 2 times daily. Stronger cream for rash. 50 g 3    Calcium Carb-Cholecalciferol (CALCIUM 600+D) 600-20 MG-MCG TABS Take 1 tablet by mouth 2 times daily.      furosemide (LASIX) 40 MG tablet Take 1 tablet (40 mg) by mouth daily for 10 days. 10 tablet 0    ipratropium - albuterol 0.5 mg/2.5 mg/3 mL (DUONEB) 0.5-2.5 (3) MG/3ML neb solution TAKE 1 VIAL(3ML) BY NEBULIZATION EVERY 6 HOURS AS NEEDED FOR COUGH WITH PHELGM. 90 mL 2    lipase-protease-amylase (CREON) 72195-59120-400608 units CPEP per EC capsule Take 2 capsules by mouth 3 times daily (with meals). With meals or snacks      multivitamin w/minerals (THERA-VIT-M) tablet Take 1 tablet by mouth daily.      nebulizer nebulization 1 nebulizer please as covered by insurance.  May also dispense supplies (tubing, inhalation device, etc) as needed. 1 each 0    oxyCODONE IR (ROXICODONE) 10 MG tablet Take 1 tablet (10 mg) by mouth 5 times daily. 150 tablet 0    POTASSIUM CITRATE PO Take 200 mg by mouth daily.      tiotropium-olodaterol (STIOLTO RESPIMAT) 2.5-2.5 MCG/ACT AERS Inhale 2 puffs into the lungs daily.      Vitamin D3 (VITAMIN D, CHOLECALCIFEROL,) 25 mcg (1000 units) tablet Take 1 tablet by mouth daily.       No current facility-administered medications for this visit.       Allergies   Allergen Reactions    Mirtazapine      Other reaction(s): Other (See Comments)  Hallucination     Tape [Adhesive Tape]      Allergy Hx  In addition to NO paper tape    Liquid Adhesive Rash     Other reaction(s): Other (See Comments)  Allergy Hx - Rash from paper tape       Past Medical History:   Diagnosis Date    Acute myeloid leukemia (AML), M2 (H)     AML (acute myeloid leukemia) (H) 12/2009    Baker's cyst     Chronic back pain     Chronic diarrhea     Compression fx, lumbar spine (H)     Controlled substance agreement signed 08/03/2017    COPD (chronic obstructive pulmonary disease) (H)     COPD (chronic  obstructive pulmonary disease) (H)     Full code status 09/10/2017    Gastric ulcer     pt states she has not had any ulcers    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)     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    History of PID     Kyphosis (acquired) (postural) 07/26/2024    Metatarsal fracture 2017    2nd     Migraine without aura, without mention of intractable migraine without mention of status migrainosus     Osteoporosis 2020    DXA    Osteoporosis 05/03/2016    Papular rash, generalized     Post-menopausal 02/08/2020    Early surgical menopause    Pseudogout 11/30/2016    Serrated adenoma of colon 07/17/2020    Status post allogeneic bone marrow transplant (H)     Surgical menopause 1976    oophorectomy    Tobacco abuse     Traumatic compression fracture of T8 thoracic vertebra, closed, initial encounter (H) 04/26/2016    Tubular adenoma of colon 07/17/2020    Uncomplicated asthma     Vertebral compression fracture (H) 2014    Zoster 2018        Patient Active Problem List   Diagnosis    Exocrine pancreatic insufficiency    Nephrolithiasis    Anxiety    Chronic back pain    Controlled substance agreement signed - 12/24 - oxycodone - UDS 12/24    COPD (chronic obstructive pulmonary disease) (H)    Financial difficulties    Full code status    Gastric ulcer    Migraine headache    Papular rash, generalized    Pseudogout    Senile purpura    Tobacco abuse    Traumatic compression fracture of T8 thoracic vertebra, closed, initial encounter (H)    Tubular adenoma of colon - advanced adenoma in 2017 - single small adenoma in 2020    OP (osteoporosis)    History of acute myeloid leukemia    Status post allogeneic bone marrow transplant (H) - 2010 - [facility]    Chronic diarrhea    Chronic, continuous use of opioids    Frailty    Hypokalemia    Closed compression fracture of L4 lumbar vertebra, sequela    Closed wedge compression fracture of T5 vertebra, sequela    Closed nondisplaced fracture of left  "pubis, initial encounter (H)    Closed displaced intertrochanteric fracture of right femur, initial encounter (H)       Past Surgical History:   Procedure Laterality Date    ABDOMEN SURGERY      APPENDECTOMY      BIOPSY      COLONOSCOPY N/A 07/28/2020    Procedure: COLONOSCOPY, WITH POLYPECTOMY AND BIOPSY;  Surgeon: Gianni Valerio MD;  Location:  OR    HERNIA REPAIR      HIP PINNING Right 03/13/2025    Procedure: INTERNAL FIXATION, FRACTURE, TROCHANTERIC, RIGHT HIP, USING RODS, USING HANA TABLE;  Surgeon: Sidney Catalan MD;  Location: [facility] OR    HYSTERECTOMY TOTAL ABDOMINAL, BILATERAL SALPINGO-OOPHORECTOMY, COMBINED  1976    IR MISCELLANEOUS PROCEDURE  02/22/2010    TRANSPLANT  01/01/2010    VERTEBROPLASTY  01/01/2016    T5 and T8 vertebrae.       Family History   Problem Relation Age of Onset    Diabetes Mother         borderline    Lung Cancer Mother     Pancreatic Cancer Father     Other Cancer Father     Diabetes Maternal Grandmother     Diabetes Maternal Uncle     Anesthesia Reaction Other     Anesthesia Reaction Sister     Thyroid Disease No family hx of     Melanoma No family hx of     Skin Cancer No family hx of        Reviewed past medical, surgical, and family history with patient found on new patient intake packet located in EMR Media tab.     SOCIAL HX: Reviewed    ROS: Specifically negative for bowel/bladder dysfunction, balance changes, headache, dizziness, foot drop, fevers, chills, appetite changes, nausea/vomiting, unexplained weight loss. Otherwise 13 systems reviewed are negative. Please see the patient's intake questionnaire from today for details.    OBJECTIVE:  /67 (BP Location: Left arm, Patient Position: Sitting, Cuff Size: Adult Small)   Pulse 78   Ht 5' 6\" (1.676 m)   Wt 93 lb (42.2 kg)   LMP  (LMP Unknown)   SpO2 94%   BMI 15.01 kg/m      PHYSICAL EXAMINATION:  --CONSTITUTIONAL: Vital signs as above. No acute distress. The patient is well nourished and well " groomed.  --PSYCHIATRIC: The patient is awake, alert, oriented to person, place, time and answering questions appropriately with clear speech. Appropriate mood and affect   --RESPIRATORY: Normal rhythm and effort. No abnormal accessory muscle breathing patterns noted.   --GROSS MOTOR: Easily arises from a seated position.  --LUMBAR SPINE: Inspection reveals no evidence of deformity. Range of motion is not limited in flexion, extension, lateral rotation. Mild tenderness to palpation of lumbar paraspinals, no tenderness in spinous processes.  Facet loading (Tate test) negative, seated SLR positive on the right side  --HIPS: Full range of motion bilaterally.   --KNEES: Right knee is visibly and palpably swollen compared to the left.  No warmth, redness, or tenderness to palpation.  Good range of motion except for very slight flexion lag of 5 to 10 degrees.  Palpable Baker's cyst in the right knee posteriorly.  --LOWER EXTREMITY MOTOR TESTING:  Hip flexion left 5/5, right 5/5  Knee extension left 5/5, right 5/5  Ankle dorsiflexors left 5/5, right 5/5  Ankle plantarflexors left 5/5, right 5/5   Great toe extension left 5/5, right 5/5   Knee flexion left 5/5, right 5/5  --NEUROLOGIC: Sensation to lower extremities is intact bilaterally in L2-S1 dermatomes.  Negative Cordoba's bilaterally. Reflexes intact in BLE, no clonus.  --VASCULAR: Warm upper limbs bilaterally. Capillary refill in the upper extremities is less than 1 second.    RESULTS: Available medical records from [facility] and any other outside records were reviewed today.     Imaging:  Available relevant imaging was personally reviewed and interpreted today. The images were shown to the patient and the findings were explained using a spine model.    X-ray right knee, foot, ankle 4/26/25:  IMPRESSION: Bones are demineralized.     Right knee shows a large nonspecific joint effusion with chondrocalcinosis. No evidence of an acute fracture. Moderate  patellofemoral compartment arthritic change.     Ankle mortise is intact. Joint spaces of the right foot are maintained.     There is no evidence of an acute displaced right foot or ankle fracture.    CT lumbar spine 12/23/2024:    IMPRESSION:  1.  No CT evidence for acute fracture or posttraumatic subluxation.  2.  Chronic superior endplate compression fractures at L4 and L5.  3.  Multilevel lumbar spondylosis as above.

## 2025-06-16 NOTE — PROGRESS NOTES
[facility] Visit     Office Visit       Date: 06/16/2025   Patient Name: Diane Manzanares  MRN: [id number]  YOB: 1960    Referring Physician: No ref. provider found     Chief Complaint:   Chief Complaint   Patient presents with    Left Ankle - Pain     DOI: 2/7/25       History of Present Illness: Diane Manzanares is a 64 y.o. female who is here today for left ankle pain.  Suffered mechanical fall in February injuring left ankle and back.  Seen and treated by occupational therapy.   did therapy on her back for a couple of months after her injury.  Did not do any therapy on her ankle because she was in a cam boot at that time.  States discontinued boot per occupational therapy's instruction after a couple of months.  Has had persistent pain in her about her left ankle.  Wears a brace that she got from [facility] which may or may not help with her symptoms.   does have pain that is worse with ambulating longer distances.  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    Subjective   Review of Systems: Review of Systems   Constitutional: Negative.  Negative for chills, fatigue and fever.   HENT: Negative.  Negative for congestion and dental problem.    Eyes: Negative.  Negative for blurred vision.   Respiratory: Negative.  Negative for shortness of breath.    Cardiovascular: Negative.  Negative for leg swelling.   Gastrointestinal: Negative.  Negative for abdominal pain.   Endocrine: Negative.  Negative for polyuria.   Genitourinary: Negative.  Negative for difficulty urinating.   Musculoskeletal:  Positive for arthralgias.   Skin: Negative.    Allergic/Immunologic: Negative.    Neurological: Negative.    Hematological: Negative.  Negative for adenopathy.   Psychiatric/Behavioral: Negative.  Negative for behavioral problems.         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    Insomnia     Menopausal symptoms 2016    Morbid obesity due to excess calories 2016    Overactive bladder 2016    Pain of both hip joints 2016    Rectal polyp 4/3/2020    Sarcoidosis of lung 2016    Sarcoidosis of skin 2016       Past Surgical History:   Past Surgical History:   Procedure Laterality Date    HYSTERECTOMY      OOPHORECTOMY      SHOULDER SURGERY      Right shoulder    TUBAL ABDOMINAL LIGATION         Family History:   Family History   Problem Relation Age of Onset    Hyperlipidemia Mother     Osteoarthritis Mother     Hypertension Mother     Arthritis Father     Heart failure Father     Diabetes Father     Hypertension Father     Kidney disease Father     Stroke Father     Obesity Other     Breast cancer Neg Hx     Ovarian cancer Neg Hx        Social History:   Social History     Socioeconomic History    Marital status:    Tobacco Use    Smoking status: Former     Current packs/day: 0.00     Average packs/day: 1 pack/day for 15.0 years (15.0 ttl pk-yrs)     Types: Cigarettes     Start date:      Quit date:      Years since quittin.4     Passive exposure: Past    Smokeless tobacco: Never   Vaping Use    Vaping status: Never Used   Substance and Sexual Activity    Alcohol use: Never    Drug use: Never    Sexual activity: Defer       Medications:   Current Outpatient Medications:     albuterol sulfate  (90 Base) MCG/ACT inhaler, INHALE 2 PUFFS EVERY 4 HOURS AS NEEDED FOR WHEEZING OR SHORTNESS OF AIR, Disp: 6.7 g, Rfl: 2    amLODIPine (NORVASC) 10 MG tablet, Take 1 tablet by mouth Daily., Disp: 90 tablet, Rfl: 3    clobetasol propionate (TEMOVATE) 0.05 % cream, Apply to affected areas in thin layer BID, Disp: 30 g, Rfl: 0    linaclotide (Linzess) 290 MCG capsule  "capsule, Take 1 capsule by mouth Every Morning Before Breakfast., Disp: 30 capsule, Rfl: 5    loratadine (Claritin) 10 MG tablet, Take 1 tablet by mouth Daily., Disp: 30 tablet, Rfl: 3    losartan-hydrochlorothiazide (HYZAAR) 50-12.5 MG per tablet, Take 1 tablet by mouth Daily., Disp: 90 tablet, Rfl: 3    oxybutynin XL (DITROPAN XL) 15 MG 24 hr tablet, Take 1 tablet by mouth Daily., Disp: 90 tablet, Rfl: 1    phentermine (Adipex-P) 37.5 MG tablet, Take 1 tablet by mouth Every Morning Before Breakfast., Disp: 30 tablet, Rfl: 0    gabapentin (NEURONTIN) 300 MG capsule, Take 1 capsule by mouth At Night As Needed (leg pain). (Patient not taking: Reported on 6/16/2025), Disp: 30 capsule, Rfl: 0    Mirabegron ER (MYRBETRIQ) 25 MG tablet sustained-release 24 hour 24 hr tablet, , Disp: , Rfl:     ondansetron ODT (ZOFRAN-ODT) 8 MG disintegrating tablet, Take 1/2 to 1 tablet by mouth (dissolve under tongue or swallow) every 8 hours as needed for nausea. (Patient not taking: Reported on 6/16/2025), Disp: 30 tablet, Rfl: 1    rosuvastatin (Crestor) 5 MG tablet, Take 1 tablet by mouth Daily. (Patient not taking: Reported on 6/16/2025), Disp: 90 tablet, Rfl: 3    Allergies: No Known Allergies    I reviewed the patient's chief complaint, history of present illness, review of systems, past medical history, surgical history, family history, social history, medications and allergy list.     Objective    Vital Signs:   Vitals:    06/16/25 1045   BP: 130/80   Weight: 83.5 kg (184 lb)   Height: 160 cm (62.99\")     Body mass index is 32.6 kg/m².    Ortho Exam:  left LE Foot and Ankle Exam:   Slightly antalgic gait pattern.   Neurological exam of the superficial peroneal, deep peroneal, plantar, sural and saphenous nerves demonstrates intact sensation and normal motor function.   There is good perfusion to the toes.   The skin is intact throughout the foot and ankle without ulceration.   Range of motion of ankle, subtalar joint, midfoot " and toes is within normal limits.   There is some tenderness over the lateral ankle ligaments.  Patient seems to be more focally tender over the distal fibula proximal to the joint line.  Does not seem to be tender about the posterior ankle area or peroneal tendons.  Some tenderness around the syndesmotic area at the joint line.      Results Review:   Imaging Results (Last 24 Hours)       Procedure Component Value Units Date/Time    XR Ankle 3+ View Left [[id number]] Resulted: 06/16/25 1103     Updated: 06/16/25 1103    Narrative:      Left Ankle X-Ray 06/16/25   Indication: Pain  Views: 3 weight bearing , comparison none  Findings: xrays reviewed by me today in the office and show no acute   osseous abnormality, ankle mortise congruent and well-maintained with no   signs of syndesmotic widening on weightbearing views                 Assessment / Plan    Assessment/Plan:   Diagnoses and all orders for this visit:    1. Left ankle pain, unspecified chronicity (Primary)  -     XR Ankle 3+ View Left  -     Ambulatory Referral to Physical Therapy for Evaluation & Treatment      Discussed left ankle pain/injury (undiagnosed new problem with uncertain prognosis) with patient as well as treatment options at length. Decision regarding surgical intervention considered. Patient is not a candidate due to trial of nonoperative management. Also reviewed external note from 4/30/25.  Patient is describing injury to the back with pain that radiates to her lateral ankle and foot.  Seems to have suffered a separate injury to her foot.  Symptoms I think are currently concerning for ankle sprain versus stress fracture.  Has not tried dedicated physical therapy for her ankle.  I think it be worth trying some therapy.  Also patient reports she has a boot at home.  Advised patient if she knows she can be doing activities that we will exacerbate her symptoms she might consider wearing a boot for those activities.  Provided with an even up  in clinic today.  Will plan for patient to do therapy for the next 6 weeks at which time she will follow-up in clinic for reevaluation.  Symptoms are persisting or worsening will consider MRI.  Was a pleasure seeing her today.    Follow Up:   Return in about 6 weeks (around 7/28/2025) for Recheck.      Zach Light MD  [facility]